# Patient Record
Sex: MALE | Race: WHITE | Employment: OTHER | URBAN - METROPOLITAN AREA
[De-identification: names, ages, dates, MRNs, and addresses within clinical notes are randomized per-mention and may not be internally consistent; named-entity substitution may affect disease eponyms.]

---

## 2018-08-17 ENCOUNTER — APPOINTMENT (OUTPATIENT)
Dept: ULTRASOUND IMAGING | Age: 53
End: 2018-08-17
Payer: MEDICARE

## 2018-08-17 ENCOUNTER — HOSPITAL ENCOUNTER (EMERGENCY)
Age: 53
Discharge: HOME | End: 2018-08-17
Attending: EMERGENCY MEDICINE
Payer: MEDICARE

## 2018-08-17 VITALS
WEIGHT: 270 LBS | RESPIRATION RATE: 18 BRPM | BODY MASS INDEX: 40.92 KG/M2 | OXYGEN SATURATION: 100 % | TEMPERATURE: 98.4 F | HEART RATE: 81 BPM | SYSTOLIC BLOOD PRESSURE: 128 MMHG | DIASTOLIC BLOOD PRESSURE: 97 MMHG | HEIGHT: 68 IN

## 2018-08-17 DIAGNOSIS — N45.2 ACUTE ORCHITIS: ICD-10-CM

## 2018-08-17 DIAGNOSIS — N45.1 EPIDIDYMITIS: Primary | ICD-10-CM

## 2018-08-17 LAB
A/G RATIO: 1.1 (ref 1.1–2.2)
ALBUMIN SERPL-MCNC: 4.1 G/DL (ref 3.4–5)
ALP BLD-CCNC: 62 U/L (ref 40–129)
ALT SERPL-CCNC: 17 U/L (ref 10–40)
ANION GAP SERPL CALCULATED.3IONS-SCNC: 14 MMOL/L (ref 3–16)
AST SERPL-CCNC: 18 U/L (ref 15–37)
BASOPHILS ABSOLUTE: 0.1 K/UL (ref 0–0.2)
BASOPHILS RELATIVE PERCENT: 1 %
BILIRUB SERPL-MCNC: 0.4 MG/DL (ref 0–1)
BUN BLDV-MCNC: 13 MG/DL (ref 7–20)
CALCIUM SERPL-MCNC: 9.6 MG/DL (ref 8.3–10.6)
CHLORIDE BLD-SCNC: 96 MMOL/L (ref 99–110)
CO2: 23 MMOL/L (ref 21–32)
CREAT SERPL-MCNC: 0.9 MG/DL (ref 0.9–1.3)
EOSINOPHILS ABSOLUTE: 0.1 K/UL (ref 0–0.6)
EOSINOPHILS RELATIVE PERCENT: 1.1 %
GFR AFRICAN AMERICAN: >60
GFR NON-AFRICAN AMERICAN: >60
GLOBULIN: 3.9 G/DL
GLUCOSE BLD-MCNC: 103 MG/DL (ref 70–99)
HCT VFR BLD CALC: 43.3 % (ref 40.5–52.5)
HEMOGLOBIN: 15 G/DL (ref 13.5–17.5)
LYMPHOCYTES ABSOLUTE: 2.7 K/UL (ref 1–5.1)
LYMPHOCYTES RELATIVE PERCENT: 22.4 %
MCH RBC QN AUTO: 31.3 PG (ref 26–34)
MCHC RBC AUTO-ENTMCNC: 34.6 G/DL (ref 31–36)
MCV RBC AUTO: 90.3 FL (ref 80–100)
MONOCYTES ABSOLUTE: 0.7 K/UL (ref 0–1.3)
MONOCYTES RELATIVE PERCENT: 5.8 %
NEUTROPHILS ABSOLUTE: 8.4 K/UL (ref 1.7–7.7)
NEUTROPHILS RELATIVE PERCENT: 69.7 %
PDW BLD-RTO: 13.3 % (ref 12.4–15.4)
PLATELET # BLD: 319 K/UL (ref 135–450)
PMV BLD AUTO: 7.5 FL (ref 5–10.5)
POTASSIUM SERPL-SCNC: 4.2 MMOL/L (ref 3.5–5.1)
RBC # BLD: 4.8 M/UL (ref 4.2–5.9)
SODIUM BLD-SCNC: 133 MMOL/L (ref 136–145)
TOTAL PROTEIN: 8 G/DL (ref 6.4–8.2)
WBC # BLD: 12.1 K/UL (ref 4–11)

## 2018-08-17 PROCEDURE — 6370000000 HC RX 637 (ALT 250 FOR IP): Performed by: EMERGENCY MEDICINE

## 2018-08-17 PROCEDURE — 6360000002 HC RX W HCPCS: Performed by: PHYSICIAN ASSISTANT

## 2018-08-17 PROCEDURE — 85025 COMPLETE CBC W/AUTO DIFF WBC: CPT

## 2018-08-17 PROCEDURE — 96374 THER/PROPH/DIAG INJ IV PUSH: CPT

## 2018-08-17 PROCEDURE — 99284 EMERGENCY DEPT VISIT MOD MDM: CPT

## 2018-08-17 PROCEDURE — 80053 COMPREHEN METABOLIC PANEL: CPT

## 2018-08-17 PROCEDURE — 76870 US EXAM SCROTUM: CPT

## 2018-08-17 PROCEDURE — 96375 TX/PRO/DX INJ NEW DRUG ADDON: CPT

## 2018-08-17 RX ORDER — ONDANSETRON 4 MG/1
4 TABLET, ORALLY DISINTEGRATING ORAL ONCE
Status: COMPLETED | OUTPATIENT
Start: 2018-08-17 | End: 2018-08-17

## 2018-08-17 RX ORDER — FENTANYL CITRATE 50 UG/ML
25 INJECTION, SOLUTION INTRAMUSCULAR; INTRAVENOUS ONCE
Status: COMPLETED | OUTPATIENT
Start: 2018-08-17 | End: 2018-08-17

## 2018-08-17 RX ORDER — HYDROCODONE BITARTRATE AND ACETAMINOPHEN 5; 325 MG/1; MG/1
1 TABLET ORAL ONCE
Status: COMPLETED | OUTPATIENT
Start: 2018-08-17 | End: 2018-08-17

## 2018-08-17 RX ORDER — HYDROCODONE BITARTRATE AND ACETAMINOPHEN 5; 325 MG/1; MG/1
1 TABLET ORAL EVERY 6 HOURS PRN
Qty: 8 TABLET | Refills: 0 | Status: SHIPPED | OUTPATIENT
Start: 2018-08-17 | End: 2018-08-24

## 2018-08-17 RX ORDER — KETOROLAC TROMETHAMINE 30 MG/ML
30 INJECTION, SOLUTION INTRAMUSCULAR; INTRAVENOUS ONCE
Status: COMPLETED | OUTPATIENT
Start: 2018-08-17 | End: 2018-08-17

## 2018-08-17 RX ORDER — IBUPROFEN 800 MG/1
800 TABLET ORAL EVERY 8 HOURS PRN
Qty: 30 TABLET | Refills: 0 | Status: SHIPPED | OUTPATIENT
Start: 2018-08-17 | End: 2019-05-10 | Stop reason: ALTCHOICE

## 2018-08-17 RX ORDER — LEVOFLOXACIN 500 MG/1
500 TABLET, FILM COATED ORAL DAILY
Qty: 7 TABLET | Refills: 0 | Status: SHIPPED | OUTPATIENT
Start: 2018-08-17 | End: 2018-08-24

## 2018-08-17 RX ADMIN — FENTANYL CITRATE 25 MCG: 50 INJECTION INTRAMUSCULAR; INTRAVENOUS at 12:29

## 2018-08-17 RX ADMIN — HYDROCODONE BITARTRATE AND ACETAMINOPHEN 1 TABLET: 5; 325 TABLET ORAL at 14:57

## 2018-08-17 RX ADMIN — KETOROLAC TROMETHAMINE 30 MG: 30 INJECTION, SOLUTION INTRAMUSCULAR at 12:29

## 2018-08-17 RX ADMIN — ONDANSETRON 4 MG: 4 TABLET, ORALLY DISINTEGRATING ORAL at 12:29

## 2018-08-17 ASSESSMENT — PAIN DESCRIPTION - LOCATION
LOCATION: GROIN
LOCATION: SCROTUM

## 2018-08-17 ASSESSMENT — PAIN SCALES - GENERAL
PAINLEVEL_OUTOF10: 7
PAINLEVEL_OUTOF10: 8
PAINLEVEL_OUTOF10: 6
PAINLEVEL_OUTOF10: 8

## 2018-08-17 ASSESSMENT — PAIN DESCRIPTION - DESCRIPTORS: DESCRIPTORS: TENDER

## 2018-08-17 ASSESSMENT — ENCOUNTER SYMPTOMS
CONSTIPATION: 0
EYE PAIN: 0
CHEST TIGHTNESS: 0
COUGH: 0
SHORTNESS OF BREATH: 0
SORE THROAT: 0
ABDOMINAL PAIN: 0
NAUSEA: 1
FACIAL SWELLING: 0
BACK PAIN: 0
EYE REDNESS: 0
RHINORRHEA: 0
DIARRHEA: 0

## 2018-08-17 ASSESSMENT — PAIN DESCRIPTION - PAIN TYPE
TYPE: ACUTE PAIN
TYPE: ACUTE PAIN

## 2018-08-17 ASSESSMENT — PAIN DESCRIPTION - ORIENTATION: ORIENTATION: LEFT

## 2018-08-17 NOTE — ED NOTES
Pt back in room from [address] [place] [address] Rd[address].       Aryan Garcia RN  08/17/18 4992

## 2018-08-17 NOTE — ED PROVIDER NOTES
Negative for difficulty urinating, dysuria and enuresis. Musculoskeletal: Negative for arthralgias, back pain and myalgias. Skin: Negative for pallor and rash. Neurological: Negative for dizziness, numbness and headaches. Psychiatric/Behavioral: Negative for agitation, behavioral problems and confusion. Positives and Pertinent negatives as per HPI. Except as noted above in the ROS, all other systems were reviewed and negative. PAST MEDICAL HISTORY     Past Medical History:   Diagnosis Date    CAD (coronary artery disease) 8/25/10    Chronic back pain     Depression 8/25/10    Displacement of cervical intervertebral disc without myelopathy     Gastroesophageal reflux 8/25/10    Hypercholesteremia     Hyperlipidemia 8/25/10    Hypertension     benign essential    Hypogonadism     Hypothyroidism     acquired    Osteoarthritis     Osteoarthritis of spine 8/25/10    Type II or unspecified type diabetes mellitus without mention of complication, not stated as uncontrolled          SURGICAL HISTORY       Past Surgical History:   Procedure Laterality Date    CERVICAL FUSION      LUMBAR FUSION           CURRENT MEDICATIONS       Previous Medications    ALBUTEROL SULFATE HFA (PROAIR HFA) 108 (90 BASE) MCG/ACT INHALER    Use every 4 hours while awake for 7-10 days then PRN wheezing  Dispense with SPACER and Instruct on use. May sub Ventolin or Proventil as needed per [employer]. ATORVASTATIN (LIPITOR) 40 MG TABLET    Take 40 mg by mouth daily. LEVOTHYROXINE (SYNTHROID) 125 MCG TABLET    Take 125 mcg by mouth Daily.     LISINOPRIL (PRINIVIL) 20 MG TABLET    Take 1 tablet by mouth daily    LISINOPRIL (PRINIVIL;ZESTRIL) 20 MG TABLET    TAKE 1 TABLET BY MOUTH DAILY         ALLERGIES     Morphine sulfate    FAMILY HISTORY       Family History   Problem Relation Age of Onset    Cancer Mother     Heart Disease Father     High Blood Pressure Father           SOCIAL HISTORY       Social History     Social History    Marital status:      Spouse name: N/A    Number of children: N/A    Years of education: N/A     Social History Main Topics    Smoking status: Current Every Day Smoker     Packs/day: 1.50     Types: Cigarettes    Smokeless tobacco: Never Used    Alcohol use No    Drug use: No    Sexual activity: Not Asked     Other Topics Concern    None     Social History Narrative    None       SCREENINGS    [place] Coma Scale  Eye Opening: Spontaneous  Best Verbal Response: Oriented  Best Motor Response: Obeys commands  [place] Coma Scale Score: 15        PHYSICAL EXAM    (up to 7 for level 4, 8 or more for level 5)     ED Triage Vitals [08/17/18 1149]   BP Temp Temp Source Pulse Resp SpO2 Height Weight   -- 98.4 °F (36.9 °C) Oral 113 -- 100 % 5' 8\" (1.727 m) 270 lb (122.5 kg)       Physical Exam   Constitutional: He is oriented to person, place, and time. He appears well-developed and well-nourished. HENT:   Head: Normocephalic and atraumatic. Nose: Nose normal.   Eyes: Right eye exhibits no discharge. Left eye exhibits no discharge. Neck: Normal range of motion. Neck supple. Cardiovascular: Normal rate, regular rhythm and normal heart sounds. Exam reveals no gallop and no friction rub. No murmur heard. Pulmonary/Chest: Effort normal and breath sounds normal. No respiratory distress. He has no wheezes. He has no rales. Abdominal: Hernia confirmed negative in the right inguinal area and confirmed negative in the left inguinal area. Genitourinary: Penis normal. Cremasteric reflex is present. Right testis shows no mass, no swelling and no tenderness. Right testis is descended. Cremasteric reflex is not absent on the right side. Left testis shows swelling and tenderness (2 diffuse left testicle, specifically epididymis). Left testis shows no mass. Left testis is descended. Circumcised. No phimosis, paraphimosis, hypospadias, penile erythema or penile tenderness.  No discharge found. Musculoskeletal: Normal range of motion. Lymphadenopathy:        Right: No inguinal adenopathy present. Left: No inguinal adenopathy present. Neurological: He is alert and oriented to person, place, and time. Skin: Skin is warm and dry. He is not diaphoretic. Psychiatric: He has a normal mood and affect. His behavior is normal.   Nursing note and vitals reviewed. DIAGNOSTIC RESULTS   LABS:    Labs Reviewed   CBC WITH AUTO DIFFERENTIAL - Abnormal; Notable for the following:        Result Value    WBC 12.1 (*)     Neutrophils # 8.4 (*)     All other components within normal limits    Narrative:     Performed at:  [facility],  [place], [facility]   Phone [phone]   COMPREHENSIVE METABOLIC PANEL - Abnormal; Notable for the following:     Sodium 133 (*)     Chloride 96 (*)     Glucose 103 (*)     All other components within normal limits    Narrative:     Performed at:  [facility]) [facility],  [place], [place]   Phone [phone]   URINE RT REFLEX TO CULTURE       All other labs were within normal range or not returned as of this dictation. EKG: All EKG's are interpreted by the Emergency Department Physician who either signs or Co-signs this chart in the absence of a cardiologist.  Please see their note for interpretation of EKG. RADIOLOGY:   Non-plain film images such as CT, Ultrasound and MRI are read by the radiologist. Plain radiographic images are visualized and preliminarily interpreted by the  ED Provider with the below findings:        Interpretation per the Radiologist below, if available at the time of this note:    [address]   Final Result   Left epididymis appears slightly edematous. No hypervascularity or   epididymal lesion. No results found.       PROCEDURES   Unless otherwise noted below, none     Procedures    CRITICAL CARE TIME as soon as possible for a visit in 2 days      [place] [facility] ED  [address] [place] [address]  Go to   As needed, If symptoms worsen    Kristi Hansen, [address]  [address] [facility] [address]    Schedule an appointment as soon as possible for a visit in 2 days  Urology      DISCHARGE MEDICATIONS:  New Prescriptions    HYDROCODONE-ACETAMINOPHEN (NORCO) 5-325 MG PER TABLET    Take 1 tablet by mouth every 6 hours as needed for Pain for up to 7 days. .    IBUPROFEN (ADVIL;MOTRIN) 800 MG TABLET    Take 1 tablet by mouth every 8 hours as needed for Pain    LEVOFLOXACIN (LEVAQUIN) 500 MG TABLET    Take 1 tablet by mouth daily for 7 days       DISCONTINUED MEDICATIONS:  Discontinued Medications    No medications on file              (Please note that portions of this note were completed with a voice recognition program.  Efforts were made to edit the dictations but occasionally words are mis-transcribed.)    Rosamaria Donaldson PA-C (electronically signed)            Rissa Harrison PA-C  08/17/18 5888

## 2019-03-31 ENCOUNTER — APPOINTMENT (OUTPATIENT)
Dept: GENERAL RADIOLOGY | Age: 54
End: 2019-03-31
Payer: MEDICARE

## 2019-03-31 ENCOUNTER — HOSPITAL ENCOUNTER (EMERGENCY)
Age: 54
Discharge: HOME | End: 2019-03-31
Attending: EMERGENCY MEDICINE
Payer: MEDICARE

## 2019-03-31 VITALS
DIASTOLIC BLOOD PRESSURE: 96 MMHG | OXYGEN SATURATION: 100 % | HEIGHT: 68 IN | HEART RATE: 72 BPM | BODY MASS INDEX: 39.4 KG/M2 | SYSTOLIC BLOOD PRESSURE: 176 MMHG | RESPIRATION RATE: 20 BRPM | TEMPERATURE: 98.4 F | WEIGHT: 260 LBS

## 2019-03-31 DIAGNOSIS — R03.0 ELEVATED BLOOD PRESSURE READING: ICD-10-CM
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
## 2019-03-31 DIAGNOSIS — R07.81 RIB PAIN ON RIGHT SIDE: ICD-10-CM

## 2019-03-31 PROCEDURE — 6370000000 HC RX 637 (ALT 250 FOR IP): Performed by: PHYSICIAN ASSISTANT

## 2019-03-31 PROCEDURE — 74018 RADEX ABDOMEN 1 VIEW: CPT

## 2019-03-31 PROCEDURE — 71101 X-RAY EXAM UNILAT RIBS/CHEST: CPT

## 2019-03-31 PROCEDURE — 73080 X-RAY EXAM OF ELBOW: CPT

## 2019-03-31 PROCEDURE — 99284 EMERGENCY DEPT VISIT MOD MDM: CPT

## 2019-03-31 RX ORDER — CYCLOBENZAPRINE HCL 10 MG
10 TABLET ORAL 3 TIMES DAILY PRN
Qty: 20 TABLET | Refills: 0 | Status: SHIPPED | OUTPATIENT
Start: 2019-03-31 | End: 2019-04-07

## 2019-03-31 RX ORDER — CYCLOBENZAPRINE HCL 10 MG
10 TABLET ORAL ONCE
Status: COMPLETED | OUTPATIENT
Start: 2019-03-31 | End: 2019-03-31

## 2019-03-31 RX ORDER — NAPROXEN 500 MG/1
500 TABLET ORAL ONCE
Status: COMPLETED | OUTPATIENT
Start: 2019-03-31 | End: 2019-03-31

## 2019-03-31 RX ORDER — LISINOPRIL 10 MG/1
20 TABLET ORAL DAILY
Qty: 30 TABLET | Refills: 0 | Status: SHIPPED | OUTPATIENT
Start: 2019-03-31 | End: 2019-05-10

## 2019-03-31 RX ORDER — LISINOPRIL 20 MG/1
20 TABLET ORAL ONCE
Status: COMPLETED | OUTPATIENT
Start: 2019-03-31 | End: 2019-03-31

## 2019-03-31 RX ORDER — HYDROCODONE BITARTRATE AND ACETAMINOPHEN 5; 325 MG/1; MG/1
1 TABLET ORAL EVERY 6 HOURS PRN
Qty: 6 TABLET | Refills: 0 | Status: SHIPPED | OUTPATIENT
Start: 2019-03-31 | End: 2019-04-02

## 2019-03-31 RX ADMIN — NAPROXEN 500 MG: 500 TABLET ORAL at 13:01

## 2019-03-31 RX ADMIN — CYCLOBENZAPRINE HYDROCHLORIDE 10 MG: 10 TABLET, FILM COATED ORAL at 13:02

## 2019-03-31 RX ADMIN — LISINOPRIL 20 MG: 20 TABLET ORAL at 13:52

## 2019-03-31 ASSESSMENT — PAIN DESCRIPTION - LOCATION
LOCATION_2: ELBOW
LOCATION: BACK;RIB CAGE
LOCATION_3: RIB CAGE
LOCATION: BACK

## 2019-03-31 ASSESSMENT — PAIN SCALES - GENERAL
PAINLEVEL_OUTOF10: 7
PAINLEVEL_OUTOF10: 5

## 2019-03-31 ASSESSMENT — PAIN DESCRIPTION - PAIN TYPE
TYPE: ACUTE PAIN;CHRONIC PAIN
TYPE_3: ACUTE PAIN
TYPE_2: ACUTE PAIN
TYPE: ACUTE PAIN

## 2019-03-31 ASSESSMENT — ENCOUNTER SYMPTOMS
RESPIRATORY NEGATIVE: 1
BACK PAIN: 1
GASTROINTESTINAL NEGATIVE: 1

## 2019-03-31 ASSESSMENT — PAIN DESCRIPTION - DESCRIPTORS
DESCRIPTORS: ACHING
DESCRIPTORS: ACHING
DESCRIPTORS_3: ACHING

## 2019-03-31 ASSESSMENT — PAIN DESCRIPTION - ORIENTATION
ORIENTATION_3: RIGHT
ORIENTATION_2: RIGHT

## 2019-03-31 ASSESSMENT — PAIN DESCRIPTION - INTENSITY
RATING_3: 6
RATING_2: 4

## 2019-03-31 NOTE — ED PROVIDER NOTES
[address] ED  eMERGENCY dEPARTMENT eNCOUnter        Pt Name: Leno Pearson. MRN: [id number]  Birthdate 1965  Date of evaluation: 3/31/2019  Provider: Tammi Palencia PA-C  PCP: Kimberly Rhodes MD    This patient was seen and evaluated by the attending physician Dr. Elise Velazquez. CHIEF COMPLAINT       Chief Complaint   Patient presents with    Fall     slipped and fell in water this morning. Pt states pain to right elbow, right ribs and back pain. Denies LOC did not strike his head       HISTORY OF PRESENT ILLNESS   (Location/Symptom, Timing/Onset, Context/Setting, Quality, Duration, Modifying Factors, Severity)  Note limiting factors. Leno Pearson. is a 53 y.o. male brought in today complaining of right sided rib pain as well as right elbow pain. Patient states he was mopping any slipped and fell landing on his right side. Onset was about 6 hours ago. Symptoms have been persistent since onset. Patient states he took Motrin and Tylenol with no relief at home. Patient denies hitting his head or loss of consciousness with the fall. No aggravating symptoms. No alleviating symptoms. Patient has not tried anything else at home for symptomatic relief at this time. Nursing Notes were all reviewed and agreed with or any disagreements were addressed  in the HPI. REVIEW OF SYSTEMS    (2-9 systems for level 4, 10 or more for level 5)     Review of Systems   Constitutional: Negative. HENT: Negative. Respiratory: Negative. Cardiovascular: Negative. Gastrointestinal: Negative. Genitourinary: Negative. Musculoskeletal: Positive for arthralgias and back pain. Skin: Negative. Neurological: Negative. Positives and Pertinent negatives as per HPI. Except as noted abovein the ROS, all other systems were reviewed and negative.        PAST MEDICAL HISTORY     Past Medical History:   Diagnosis Date    CAD (coronary artery disease) 8/25/10    Chronic back pain     Depression 8/25/10    Displacement of cervical intervertebral disc without myelopathy     Gastroesophageal reflux 8/25/10    Hypercholesteremia     Hyperlipidemia 8/25/10    Hypertension     benign essential    Hypogonadism     Hypothyroidism     acquired    Osteoarthritis     Osteoarthritis of spine 8/25/10    Type II or unspecified type diabetes mellitus without mention of complication, not stated as uncontrolled          SURGICAL HISTORY     Past Surgical History:   Procedure Laterality Date    CERVICAL FUSION      LUMBAR FUSION           CURRENTMEDICATIONS       Previous Medications    ALBUTEROL SULFATE HFA (PROAIR HFA) 108 (90 BASE) MCG/ACT INHALER    Use every 4 hours while awake for 7-10 days then PRN wheezing  Dispense with SPACER and Instruct on use. May sub Ventolin or Proventil as needed per [employer]. ATORVASTATIN (LIPITOR) 40 MG TABLET    Take 40 mg by mouth daily. IBUPROFEN (ADVIL;MOTRIN) 800 MG TABLET    Take 1 tablet by mouth every 8 hours as needed for Pain    LEVOTHYROXINE (SYNTHROID) 125 MCG TABLET    Take 125 mcg by mouth Daily.     LISINOPRIL (PRINIVIL) 20 MG TABLET    Take 1 tablet by mouth daily    LISINOPRIL (PRINIVIL;ZESTRIL) 20 MG TABLET    TAKE 1 TABLET BY MOUTH DAILY         ALLERGIES     Morphine sulfate    FAMILYHISTORY       Family History   Problem Relation Age of Onset    Cancer Mother     Heart Disease Father     High Blood Pressure Father           SOCIAL HISTORY       Social History     Socioeconomic History    Marital status:      Spouse name: None    Number of children: None    Years of education: None    Highest education level: None   Occupational History    None   Social Needs    Financial resource strain: None    Food insecurity:     Worry: None     Inability: None    Transportation needs:     Medical: None     Non-medical: None   Tobacco Use    Smoking status: Current Every Day Smoker     Packs/day: 1.50     Types: Cigarettes    Smokeless tobacco: Never Used   Substance and Sexual Activity    Alcohol use: No    Drug use: No    Sexual activity: None   Lifestyle    Physical activity:     Days per week: None     Minutes per session: None    Stress: None   Relationships    Social connections:     Talks on phone: None     Gets together: None     Attends Buddhist service: None     Active member of club or organization: None     Attends meetings of clubs or organizations: None     Relationship status: None    Intimate partner violence:     Fear of current or ex partner: None     Emotionally abused: None     Physically abused: None     Forced sexual activity: None   Other Topics Concern    None   Social History Narrative    None       SCREENINGS    [place] Coma Scale  Eye Opening: Spontaneous  Best Verbal Response: Oriented  Best Motor Response: Obeys commands  [place] Coma Scale Score: 15        PHYSICAL EXAM    (up to 7 for level 4, 8 or more for level 5)     ED Triage Vitals [03/31/19 1141]   BP Temp Temp Source Pulse Resp SpO2 Height Weight   (!) 191/106 97 °F (36.1 °C) Oral 93 20 99 % 5' 8\" (1.727 m) 260 lb (117.9 kg)       Physical Exam   Constitutional: He is oriented to person, place, and time. He appears well-developed and well-nourished. HENT:   Head: Normocephalic and atraumatic. Nose: Nose normal.   Eyes: Right eye exhibits no discharge. Left eye exhibits no discharge. Neck: Normal range of motion. Neck supple. Cardiovascular: Normal rate, regular rhythm and normal heart sounds. Exam reveals no gallop. No murmur heard. Pulmonary/Chest: Effort normal and breath sounds normal. No respiratory distress. He has no wheezes. He has no rales. He exhibits no tenderness. Musculoskeletal: Normal range of motion. He exhibits no deformity. Right shoulder: Normal. He exhibits normal range of motion, no tenderness, no bony tenderness, no deformity and no pain.         Right elbow: Tenderness found. Radial head, medial epicondyle and olecranon process tenderness noted. Cervical back: He exhibits no tenderness, no bony tenderness, no deformity and no pain. Thoracic back: Normal. He exhibits no tenderness, no bony tenderness and no pain. Lumbar back: He exhibits no tenderness, no bony tenderness, no deformity and no pain. Arms:  Neurological: He is alert and oriented to person, place, and time. He has normal strength. No sensory deficit. Reflex Scores:       Tricep reflexes are 2+ on the right side and 2+ on the left side. Bicep reflexes are 2+ on the right side and 2+ on the left side. Brachioradialis reflexes are 2+ on the right side and 2+ on the left side. Skin: Skin is warm and dry. He is not diaphoretic. Psychiatric: He has a normal mood and affect. His behavior is normal.   Nursing note and vitals reviewed. DIAGNOSTIC RESULTS   LABS:    Labs Reviewed - No data to display    All other labs were within normal range or not returned as of this dictation. EKG: All EKG's are interpreted by the Emergency Department Physician who either signs orCo-signs this chart in the absence of a cardiologist.  Please see their note for interpretation of EKG. RADIOLOGY:   Non-plain film images such as CT, Ultrasound and MRI are read by the radiologist. [facility] radiographic images are visualized andpreliminarily interpreted by the  ED Provider with the below findings:        Interpretation [facility] Radiologist below, if available at the time of this note:    XR ABDOMEN (KUB) (SINGLE AP VIEW)   Final Result   No acute process demonstrated         XR RIBS RIGHT INCLUDE CHEST (MIN 3 VIEWS)   Final Result   No evidence of rib fracture         XR ELBOW RIGHT (MIN 3 VIEWS)   Final Result   No radiographic abnormality of the elbow. No fracture or dislocation. No results found.       PROCEDURES   Unless otherwise noted below, none     Procedures    CRITICAL CARE TIME   N/A    CONSULTS:  None      EMERGENCY DEPARTMENT COURSE and DIFFERENTIALDIAGNOSIS/MDM:   Vitals:    Vitals:    03/31/19 1300 03/31/19 1302 03/31/19 1418 03/31/19 1419   BP: (!) 182/111 (!) 182/111 (!) 181/102 (!) 181/102   Pulse: 80  72    Resp: 20      Temp: 98.4 °F (36.9 °C)      TempSrc: Oral      SpO2: 99% 99%  100%   Weight:       Height:           Patient was given thefollowing medications:  Medications   cyclobenzaprine (FLEXERIL) tablet 10 mg (10 mg Oral Given 3/31/19 1302)   naproxen (NAPROSYN) tablet 500 mg (500 mg Oral Given 3/31/19 1301)   lisinopril (PRINIVIL;ZESTRIL) tablet 20 mg (20 mg Oral Given 3/31/19 1352)       Patient brought in today for complaints of a fall. Patient states he landed on his right side and is complaining of right elbow pain and right sided rib pain. On exam he is alert oriented afebrile hemodynamically stable breathing comfortably and room air satting at 99%. Patient appears nontoxic no respiratory distress. Patient does have reproducible right-sided rib pain. No ecchymosis or deformities noted to the rib cage. Patient has bilateral and equal breath sounds. Patient has no bony deformities to the cervical thoracic or lumbar spine. No bony tenderness. Full range of motion of his back and right arm. Patient neurovascularly intact. Chest X-ray of the right ribs and chest are unremarkable for acute fracture. X-ray of the right elbow is negative as well. Most of his symptoms are musculoskeletal.  Patient will be given Norco and Flexeril for home. I did write him lisinopril 20 mg once daily for 1 month as he stated he was out of his blood pressure medication. Advised him to call his primary care provider and to follow-up within one week. I also gave him follow-up for [place] [facility] if pain did not resolve. Patient told to decrease activity and to rest at home. I also encouraged icing for inflammation.  Seen and evaluated by my attending who agreed with

## 2019-05-10 ENCOUNTER — OFFICE VISIT (OUTPATIENT)
Dept: FAMILY MEDICINE CLINIC | Age: 54
End: 2019-05-10
Payer: MEDICARE

## 2019-05-10 VITALS
BODY MASS INDEX: 40.16 KG/M2 | HEIGHT: 68 IN | DIASTOLIC BLOOD PRESSURE: 107 MMHG | WEIGHT: 265 LBS | HEART RATE: 86 BPM | OXYGEN SATURATION: 98 % | SYSTOLIC BLOOD PRESSURE: 194 MMHG

## 2019-05-10 DIAGNOSIS — E11.42 DIABETIC POLYNEUROPATHY ASSOCIATED WITH TYPE 2 DIABETES MELLITUS (HCC): Primary | ICD-10-CM

## 2019-05-10 DIAGNOSIS — Z72.0 TOBACCO USE: ICD-10-CM

## 2019-05-10 DIAGNOSIS — Z12.11 ENCOUNTER FOR COLORECTAL CANCER SCREENING: ICD-10-CM

## 2019-05-10 DIAGNOSIS — Z12.12 ENCOUNTER FOR COLORECTAL CANCER SCREENING: ICD-10-CM

## 2019-05-10 DIAGNOSIS — E66.01 MORBID OBESITY WITH BMI OF 40.0-44.9, ADULT (HCC): ICD-10-CM

## 2019-05-10 DIAGNOSIS — E11.69 TYPE 2 DIABETES MELLITUS WITH OTHER SPECIFIED COMPLICATION, UNSPECIFIED WHETHER LONG TERM INSULIN USE (HCC): ICD-10-CM

## 2019-05-10 DIAGNOSIS — I10 ESSENTIAL HYPERTENSION: ICD-10-CM

## 2019-05-10 LAB — HBA1C MFR BLD: 5.8 %

## 2019-05-10 PROCEDURE — G8417 CALC BMI ABV UP PARAM F/U: HCPCS | Performed by: FAMILY MEDICINE

## 2019-05-10 PROCEDURE — 4004F PT TOBACCO SCREEN RCVD TLK: CPT | Performed by: FAMILY MEDICINE

## 2019-05-10 PROCEDURE — 99204 OFFICE O/P NEW MOD 45 MIN: CPT | Performed by: FAMILY MEDICINE

## 2019-05-10 PROCEDURE — 3017F COLORECTAL CA SCREEN DOC REV: CPT | Performed by: FAMILY MEDICINE

## 2019-05-10 PROCEDURE — 2022F DILAT RTA XM EVC RTNOPTHY: CPT | Performed by: FAMILY MEDICINE

## 2019-05-10 PROCEDURE — 83036 HEMOGLOBIN GLYCOSYLATED A1C: CPT | Performed by: FAMILY MEDICINE

## 2019-05-10 PROCEDURE — 3046F HEMOGLOBIN A1C LEVEL >9.0%: CPT | Performed by: FAMILY MEDICINE

## 2019-05-10 PROCEDURE — G8427 DOCREV CUR MEDS BY ELIG CLIN: HCPCS | Performed by: FAMILY MEDICINE

## 2019-05-10 RX ORDER — BLOOD PRESSURE TEST KIT
1 KIT MISCELLANEOUS 2 TIMES DAILY
Qty: 1 KIT | Refills: 0 | Status: CANCELLED | OUTPATIENT
Start: 2019-05-10

## 2019-05-10 RX ORDER — VARENICLINE TARTRATE 25 MG
KIT ORAL
Qty: 1 BOX | Refills: 0 | Status: SHIPPED | OUTPATIENT
Start: 2019-05-10 | End: 2019-06-19 | Stop reason: ALTCHOICE

## 2019-05-10 RX ORDER — VARENICLINE TARTRATE 25 MG
KIT ORAL
Qty: 1 BOX | Refills: 0 | Status: SHIPPED | OUTPATIENT
Start: 2019-05-10 | End: 2019-05-10 | Stop reason: SDUPTHER

## 2019-05-10 RX ORDER — LISINOPRIL 10 MG/1
10 TABLET ORAL DAILY
Qty: 90 TABLET | Refills: 1 | Status: SHIPPED | OUTPATIENT
Start: 2019-05-10 | End: 2019-05-10 | Stop reason: DRUGHIGH

## 2019-05-10 RX ORDER — LISINOPRIL 20 MG/1
20 TABLET ORAL DAILY
Qty: 30 TABLET | Refills: 3 | Status: SHIPPED | OUTPATIENT
Start: 2019-05-10 | End: 2019-08-07 | Stop reason: SDUPTHER

## 2019-05-10 ASSESSMENT — ENCOUNTER SYMPTOMS: BACK PAIN: 1

## 2019-05-10 NOTE — PROGRESS NOTES
5/10/2019    This is a 53 y.o. male   Chief Complaint   Patient presents with   [facility].     HTN; hyperlipidemia; hypothyroidism; Hx of DM; neuropathy   . HPI  Pt presents today as a new pt to establish a PCP. States that he had a CVA while being operated on for back surgery in 2011, feels that his BP has been elevated since then, currently on no medications. Was raising his 10 y granddaughter and his wife left them a little over a year. Has lost over 30 pounds, does get HA's when BP elevated. Wants Chantix as he is tired of smoking and wants to quit, currently uses medical marijuana instead of opiod pain medication. Has used Chantix in the past with success, denies suicidal ideation the, did have some bad dreams, currently smoking 2 packs a day. 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  Was dx in 2010, denies fatigue, polydipsia, polydipsia, or polyuria  Past Medical History:   Diagnosis Date    CAD (coronary artery disease) 8/25/10    Chronic back pain     Depression 8/25/10    Displacement of cervical intervertebral disc without myelopathy     Gastroesophageal reflux 8/25/10    Hypercholesteremia     Hyperlipidemia 8/25/10    Hypertension     benign essential    Hypogonadism     Hypothyroidism     acquired    Osteoarthritis     Osteoarthritis of spine 8/25/10    Type II or unspecified type diabetes mellitus without mention of complication, not stated as uncontrolled        Past Surgical History:   Procedure Laterality Date    CERVICAL FUSION      CHOLECYSTECTOMY      LUMBAR FUSION         Social History     Socioeconomic History    Marital status:      Spouse name: Not on file    Number of children: Not on file    Years of education: Not on file    Highest education level: Not on file   Occupational History    Not on file   Social Needs    Financial resource strain: Not on file    Food insecurity:     Worry: Not on file     Inability: Not on file    Transportation needs:     Medical: Not on file     Non-medical: Not on file   Tobacco Use    Smoking status: Current Every Day Smoker     Packs/day: 1.50     Years: 30.00     Pack years: 45.00     Types: Cigarettes    Smokeless tobacco: Never Used   Substance and Sexual Activity    Alcohol use: No    Drug use: Yes     Types: Marijuana     Comment: Medical marijuana card     Sexual activity: Not on file   Lifestyle    Physical activity:     Days per week: Not on file     Minutes per session: Not on file    Stress: Not on file   Relationships    Social connections:     Talks on phone: Not on file     Gets together: Not on file     Attends Episcopalian service: Not on file     Active member of club or organization: Not on file     Attends meetings of clubs or organizations: Not on file     Relationship status: Not on file    Intimate partner violence:     Fear of current or ex partner: Not on file     Emotionally abused: Not on file     Physically abused: Not on file     Forced sexual activity: Not on file   Other Topics Concern    Not on file   Social History Narrative    Not on file       Family History   Problem Relation Age of Onset    Cancer Mother     Heart Disease Father     High Blood Pressure Father        Current Outpatient Medications   Medication Sig Dispense Refill    varenicline (CHANTIX STARTING MONTH PAK) 0.5 MG X 11 & 1 MG X 42 tablet Take by mouth. 1 box 0    lisinopril (PRINIVIL;ZESTRIL) 20 MG tablet Take 1 tablet by mouth daily 30 tablet 3     No current facility-administered medications for this visit. Immunization History   Administered Date(s) Administered    Influenza Whole 10/28/2010       Allergies   Allergen Reactions    Morphine Sulfate      Pt is only allergic to Morphine ER, not IVP Morphine.        Admission on 08/17/2018, Discharged on 08/17/2018   Component Date Value Ref Range Status Albumin/Globulin Ratio 08/17/2018 1.1  1.1 - 2.2 Final    Total Bilirubin 08/17/2018 0.4  0.0 - 1.0 mg/dL Final    Alkaline Phosphatase 08/17/2018 62  40 - 129 U/L Final    ALT 08/17/2018 17  10 - 40 U/L Final    AST 08/17/2018 18  15 - 37 U/L Final    Globulin 08/17/2018 3.9  g/dL Final       Review of Systems   Constitutional: Negative for fatigue. Endocrine: Negative for polydipsia, polyphagia and polyuria. Musculoskeletal: Positive for back pain. Neurological: Positive for headaches. Negative for dizziness and light-headedness. BP (!) 194/107   Pulse 86   Ht 5' 8\" (1.727 m)   Wt 265 lb (120.2 kg)   SpO2 98%   BMI 40.29 kg/m²     Physical Exam   Constitutional: He is oriented to person, place, and time. He appears well-developed and well-nourished. HENT:   Head: Normocephalic and atraumatic. Eyes: Pupils are equal, round, and reactive to light. EOM are normal.   Neck: Normal range of motion. Cardiovascular: Normal rate, regular rhythm and normal heart sounds. Pulmonary/Chest: Effort normal and breath sounds normal. He has no wheezes. Abdominal: Bowel sounds are normal. There is no tenderness. Neurological: He is alert and oriented to person, place, and time. Psychiatric: He has a normal mood and affect. His behavior is normal. Judgment and thought content normal.   Vitals reviewed. Plan   Diagnosis Orders   1. Diabetic polyneuropathy associated with type 2 diabetes mellitus ([facility] [address]     2. Type 2 diabetes mellitus with other specified complication, unspecified whether long term insulin use (HCC)  POCT glycosylated hemoglobin (Hb A1C), today 5.8   3. Encounter for colorectal cancer screening  Sundar Ni MD, Gastroenterology, [facility]   4. Essential hypertension  Lisinopril 20 mg   5.  Tobacco use  varenicline (CHANTIX STARTING MONTH PAK) 0.5 MG X 11 & 1 MG X 42 tablet    DISCONTINUED: varenicline (CHANTIX STARTING MONTH PAK) 0.5 MG X 11 & 1 MG X 42 tablet Pt Instructions:  Record your BP twice a day to a log to bring to your next appointment. Return in about 2 weeks (around 5/24/2019) for HTN F/U. Counseled pt that I do not prescribe/refill CDL oil or opioid pain medication. Will consider Lyrica for diabetic polyneuropathy at next visit. Prior to Visit Medications    Medication Sig Taking? Authorizing Provider   varenicline (CHANTIX STARTING MONTH ESAU) 0.5 MG X 11 & 1 MG X 42 tablet Take by mouth.  Yes Isaak Taveras,    lisinopril (PRINIVIL;ZESTRIL) 20 MG tablet Take 1 tablet by mouth daily Yes Isaak Taveras, DO

## 2019-05-13 ENCOUNTER — TELEPHONE (OUTPATIENT)
Dept: FAMILY MEDICINE CLINIC | Age: 54
End: 2019-05-13

## 2019-05-13 DIAGNOSIS — I10 ESSENTIAL HYPERTENSION: Primary | ICD-10-CM

## 2019-05-13 RX ORDER — BLOOD PRESSURE TEST KIT
1 KIT MISCELLANEOUS 2 TIMES DAILY
Qty: 1 KIT | Refills: 0 | Status: SHIPPED | OUTPATIENT
Start: 2019-05-13 | End: 2020-01-17

## 2019-05-24 ENCOUNTER — OFFICE VISIT (OUTPATIENT)
Dept: FAMILY MEDICINE CLINIC | Age: 54
End: 2019-05-24
Payer: MEDICARE

## 2019-05-24 VITALS
OXYGEN SATURATION: 100 % | BODY MASS INDEX: 39.19 KG/M2 | WEIGHT: 258.6 LBS | TEMPERATURE: 97.7 F | SYSTOLIC BLOOD PRESSURE: 132 MMHG | HEART RATE: 73 BPM | DIASTOLIC BLOOD PRESSURE: 68 MMHG | RESPIRATION RATE: 16 BRPM | HEIGHT: 68 IN

## 2019-05-24 DIAGNOSIS — Z23 NEED FOR PROPHYLACTIC VACCINATION AGAINST STREPTOCOCCUS PNEUMONIAE (PNEUMOCOCCUS): ICD-10-CM

## 2019-05-24 DIAGNOSIS — Z23 NEED FOR PROPHYLACTIC VACCINATION AND INOCULATION AGAINST VARICELLA: ICD-10-CM

## 2019-05-24 DIAGNOSIS — M54.9 CHRONIC BILATERAL BACK PAIN, UNSPECIFIED BACK LOCATION: ICD-10-CM

## 2019-05-24 DIAGNOSIS — I10 ESSENTIAL HYPERTENSION: Primary | ICD-10-CM

## 2019-05-24 DIAGNOSIS — N52.9 ERECTILE DYSFUNCTION, UNSPECIFIED ERECTILE DYSFUNCTION TYPE: ICD-10-CM

## 2019-05-24 DIAGNOSIS — Z23 NEED FOR PROPHYLACTIC VACCINATION AGAINST DIPHTHERIA-TETANUS-PERTUSSIS (DTP): ICD-10-CM

## 2019-05-24 DIAGNOSIS — G89.29 CHRONIC BILATERAL BACK PAIN, UNSPECIFIED BACK LOCATION: ICD-10-CM

## 2019-05-24 DIAGNOSIS — Z72.0 TOBACCO USE: ICD-10-CM

## 2019-05-24 PROCEDURE — G8427 DOCREV CUR MEDS BY ELIG CLIN: HCPCS | Performed by: FAMILY MEDICINE

## 2019-05-24 PROCEDURE — G8417 CALC BMI ABV UP PARAM F/U: HCPCS | Performed by: FAMILY MEDICINE

## 2019-05-24 PROCEDURE — 99214 OFFICE O/P EST MOD 30 MIN: CPT | Performed by: FAMILY MEDICINE

## 2019-05-24 PROCEDURE — 90732 PPSV23 VACC 2 YRS+ SUBQ/IM: CPT | Performed by: FAMILY MEDICINE

## 2019-05-24 PROCEDURE — 3017F COLORECTAL CA SCREEN DOC REV: CPT | Performed by: FAMILY MEDICINE

## 2019-05-24 PROCEDURE — 4004F PT TOBACCO SCREEN RCVD TLK: CPT | Performed by: FAMILY MEDICINE

## 2019-05-24 PROCEDURE — G0009 ADMIN PNEUMOCOCCAL VACCINE: HCPCS | Performed by: FAMILY MEDICINE

## 2019-05-24 ASSESSMENT — PATIENT HEALTH QUESTIONNAIRE - PHQ9
SUM OF ALL RESPONSES TO PHQ9 QUESTIONS 1 & 2: 0
1. LITTLE INTEREST OR PLEASURE IN DOING THINGS: 0
SUM OF ALL RESPONSES TO PHQ QUESTIONS 1-9: 0
SUM OF ALL RESPONSES TO PHQ QUESTIONS 1-9: 0
2. FEELING DOWN, DEPRESSED OR HOPELESS: 0

## 2019-05-24 ASSESSMENT — ENCOUNTER SYMPTOMS: BACK PAIN: 1

## 2019-05-24 NOTE — PROGRESS NOTES
education: Not on file    Highest education level: Not on file   Occupational History    Not on file   Social Needs    Financial resource strain: Not on file    Food insecurity:     Worry: Not on file     Inability: Not on file    Transportation needs:     Medical: Not on file     Non-medical: Not on file   Tobacco Use    Smoking status: Current Every Day Smoker     Packs/day: 1.50     Years: 30.00     Pack years: 45.00     Types: Cigarettes    Smokeless tobacco: Never Used   Substance and Sexual Activity    Alcohol use: No    Drug use: Yes     Types: Marijuana     Comment: Medical marijuana card     Sexual activity: Not on file   Lifestyle    Physical activity:     Days per week: Not on file     Minutes per session: Not on file    Stress: Not on file   Relationships    Social connections:     Talks on phone: Not on file     Gets together: Not on file     Attends [employer] service: Not on file     Active member of club or organization: Not on file     Attends meetings of clubs or organizations: Not on file     Relationship status: Not on file    Intimate partner violence:     Fear of current or ex partner: Not on file     Emotionally abused: Not on file     Physically abused: Not on file     Forced sexual activity: Not on file   Other Topics Concern    Not on file   Social History Narrative    Not on file       Family History   Problem Relation Age of Onset    Cancer Mother     Heart Disease Father     High Blood Pressure Father        Current Outpatient Medications   Medication Sig Dispense Refill    varenicline (CHANTIX STARTING MONTH PAK) 0.5 MG X 11 & 1 MG X 42 tablet Take by mouth. 1 box 0    lisinopril (PRINIVIL;ZESTRIL) 20 MG tablet Take 1 tablet by mouth daily 30 tablet 3    Blood Pressure KIT 1 kit by Does not apply route 2 times daily 1 kit 0     No current facility-administered medications for this visit.         Immunization History   Administered Date(s) Administered    Influenza Whole 10/28/2010       Allergies   Allergen Reactions    Morphine Sulfate      Pt is only allergic to Morphine ER, not IVP Morphine. Office Visit on 05/10/2019   Component Date Value Ref Range Status    Hemoglobin A1C 05/10/2019 5.8  % Final       Review of Systems   Constitutional: Positive for fatigue. Musculoskeletal: Positive for back pain. Neurological: Negative for dizziness and headaches. Improved facial flushing   Psychiatric/Behavioral: The patient is nervous/anxious (improved since starting Lisinopril). /68 (Site: Left Upper Arm, Position: Sitting, Cuff Size: Medium Adult)   Pulse 73   Temp 97.7 °F (36.5 °C) (Oral)   Resp 16   Ht 5' 8\" (1.727 m)   Wt 258 lb 9.6 oz (117.3 kg)   SpO2 100%   BMI 39.32 kg/m²     Physical Exam   Constitutional: He is oriented to person, place, and time. He appears well-developed and well-nourished. HENT:   Head: Normocephalic and atraumatic. Eyes: Pupils are equal, round, and reactive to light. EOM are normal.   Neck: Normal range of motion. Cardiovascular: Normal rate, regular rhythm and normal heart sounds. Pulmonary/Chest: Effort normal and breath sounds normal. He has no wheezes (bilateral). Abdominal: Bowel sounds are normal. There is no tenderness. Neurological: He is alert and oriented to person, place, and time. Psychiatric: He has a normal mood and affect. His behavior is normal. Judgment and thought content normal.   Vitals reviewed. Plan   Diagnosis Orders   1. Essential hypertension  Controlled on Lisinopril 20 mg        2. Need for prophylactic vaccination against Streptococcus pneumoniae (pneumococcus)  Pneumococcal polysaccharide vaccine 23-valent PPSV23   3. Need for prophylactic vaccination against diphtheria-tetanus-pertussis (DTP)  Tdap (ADACEL) 5-2-15.5 LF-MCG/0.5 injection   4.  Need for prophylactic vaccination and inoculation against varicella  zoster recombinant adjuvanted vaccine [facility]) 50 MCG/0.5ML SUSR injection   5. Tobacco use  Continue Chantix       Return in about 1 month (around 6/24/2019) for Physical Exam.    Prior to Visit Medications    Medication Sig Taking? Authorizing Provider   varenicline (CHANTIX STARTING MONTH PAK) 0.5 MG X 11 & 1 MG X 42 tablet Take by mouth.  Yes Gilford Nao, DO   lisinopril (PRINIVIL;ZESTRIL) 20 MG tablet Take 1 tablet by mouth daily Yes Gilford Nao, DO   Blood Pressure KIT 1 kit by Does not apply route 2 times daily  Gilford Nao, DO

## 2019-05-28 DIAGNOSIS — I10 ESSENTIAL HYPERTENSION: ICD-10-CM

## 2019-05-28 DIAGNOSIS — I10 ESSENTIAL HYPERTENSION: Primary | ICD-10-CM

## 2019-05-28 DIAGNOSIS — E11.69 TYPE 2 DIABETES MELLITUS WITH OTHER SPECIFIED COMPLICATION, UNSPECIFIED WHETHER LONG TERM INSULIN USE (HCC): ICD-10-CM

## 2019-05-28 DIAGNOSIS — Z12.5 PROSTATE CANCER SCREENING: ICD-10-CM

## 2019-05-28 DIAGNOSIS — M54.9 CHRONIC BILATERAL BACK PAIN, UNSPECIFIED BACK LOCATION: ICD-10-CM

## 2019-05-28 DIAGNOSIS — G89.29 CHRONIC BILATERAL BACK PAIN, UNSPECIFIED BACK LOCATION: ICD-10-CM

## 2019-05-28 LAB
A/G RATIO: 1.1 (ref 1.1–2.2)
ALBUMIN SERPL-MCNC: 4.3 G/DL (ref 3.4–5)
ALP BLD-CCNC: 51 U/L (ref 40–129)
ALT SERPL-CCNC: 16 U/L (ref 10–40)
ANION GAP SERPL CALCULATED.3IONS-SCNC: 13 MMOL/L (ref 3–16)
AST SERPL-CCNC: 16 U/L (ref 15–37)
BASOPHILS ABSOLUTE: 0.1 K/UL (ref 0–0.2)
BASOPHILS RELATIVE PERCENT: 1 %
BILIRUB SERPL-MCNC: 0.3 MG/DL (ref 0–1)
BUN BLDV-MCNC: 11 MG/DL (ref 7–20)
CALCIUM SERPL-MCNC: 9.7 MG/DL (ref 8.3–10.6)
CHLORIDE BLD-SCNC: 100 MMOL/L (ref 99–110)
CHOLESTEROL, TOTAL: 202 MG/DL (ref 0–199)
CO2: 24 MMOL/L (ref 21–32)
CREAT SERPL-MCNC: 1 MG/DL (ref 0.9–1.3)
EOSINOPHILS ABSOLUTE: 0.1 K/UL (ref 0–0.6)
EOSINOPHILS RELATIVE PERCENT: 2.4 %
GFR AFRICAN AMERICAN: >60
GFR NON-AFRICAN AMERICAN: >60
GLOBULIN: 3.9 G/DL
GLUCOSE BLD-MCNC: 94 MG/DL (ref 70–99)
HCT VFR BLD CALC: 46.1 % (ref 40.5–52.5)
HDLC SERPL-MCNC: 38 MG/DL (ref 40–60)
HEMOGLOBIN: 15.6 G/DL (ref 13.5–17.5)
LDL CHOLESTEROL CALCULATED: 141 MG/DL
LYMPHOCYTES ABSOLUTE: 2.3 K/UL (ref 1–5.1)
LYMPHOCYTES RELATIVE PERCENT: 38.2 %
MCH RBC QN AUTO: 31.9 PG (ref 26–34)
MCHC RBC AUTO-ENTMCNC: 33.9 G/DL (ref 31–36)
MCV RBC AUTO: 93.9 FL (ref 80–100)
MONOCYTES ABSOLUTE: 0.3 K/UL (ref 0–1.3)
MONOCYTES RELATIVE PERCENT: 5.6 %
NEUTROPHILS ABSOLUTE: 3.2 K/UL (ref 1.7–7.7)
NEUTROPHILS RELATIVE PERCENT: 52.8 %
PDW BLD-RTO: 13.1 % (ref 12.4–15.4)
PLATELET # BLD: 254 K/UL (ref 135–450)
PMV BLD AUTO: 8.1 FL (ref 5–10.5)
POTASSIUM SERPL-SCNC: 4.8 MMOL/L (ref 3.5–5.1)
PROSTATE SPECIFIC ANTIGEN: 2.21 NG/ML (ref 0–4)
RBC # BLD: 4.91 M/UL (ref 4.2–5.9)
SODIUM BLD-SCNC: 137 MMOL/L (ref 136–145)
T3 FREE: 3 PG/ML (ref 2.3–4.2)
T4 FREE: 0.8 NG/DL (ref 0.9–1.8)
TOTAL PROTEIN: 8.2 G/DL (ref 6.4–8.2)
TRIGL SERPL-MCNC: 117 MG/DL (ref 0–150)
TSH SERPL DL<=0.05 MIU/L-ACNC: 9.94 UIU/ML (ref 0.27–4.2)
VLDLC SERPL CALC-MCNC: 23 MG/DL
WBC # BLD: 6 K/UL (ref 4–11)

## 2019-05-28 RX ORDER — POLYETHYLENE GLYCOL 3350 17 G/17G
POWDER, FOR SOLUTION ORAL
Qty: 238 G | Refills: 0 | Status: ON HOLD | OUTPATIENT
Start: 2019-05-28 | End: 2019-06-12 | Stop reason: ALTCHOICE

## 2019-05-28 NOTE — LETTER
COLONOSCOPY PREP INSTRUCTIONS  MlRALAX SPLIT DOSE   [facility] PHYSICIAN ENDOSCOPY    Your colonoscopy is scheduled on: 6/12/19   __[place]_  Arrival Time: __9:00 am   DO NOT EAT OR DRINK (INCLUDING WATER) AFTER: _7:00 am_  's Name_[facility] [phone]  [facility]- [phone]    Keep these papers together; REVIEW ALL OF THEM AT LEAST 7 DAYS BEFORE THE PROCEDURE. Please complete all paperwork; including a current list of your medications, to avoid delays in the admission process. The following instructions must be followed in order to ensure your procedure has optimal outcomes. - KEEP YOUR APPOINTMENT. If for any reason, you are unable to keep your appointment, please notify us within 72 hours before your procedure. - You MUST have a responsible adult to drive you, who MUST remain at our facility the ENTIRE time. If not the procedure will be cancelled. You may go by taxi ONLY if you have a responsible adult with you. You may experience light headedness, dizziness etc., therefore you should have a responsible adult remain with you until the morning after your procedure. - Bring your insurance card and 's license. Call your insurance carrier to verify your benefits, and confirm that our facility is in your network, prior to the procedure date to ensure coverage. The facility name is listed as [facility] or [id number]  and the tax ID# is [id number].  - Due to the safety and privacy of our patients, only one visitor is allowed in the recovery area after the procedure. The center will not be responsible for lost valuables so please leave them at home. - Try to avoid seeds (strawberries, donna, and rye) for one week prior to your procedure.   - If you have questions after beginning the prep, call between 8:30 am & have received instructions regarding if and when to discontinue the medication. If you have not, or do not clearly understand the instructions, please call the office for clarification (number listed above). 5. Drink plenty of fluid. 1 day prior to your procedure:  1. Do not eat any SOLID FOOD, beginning with breakfast drink clear liquids only, which includes: Chicken or Beef Broth, Coffee/tea (without milk or creamer) Gatorade/PowerAde (no red or purple), JeIl-O (no red or purple), All Soda (even dark cola), Sorbet/Popsicles (no red or purple), Water If you take Diabetic medications (insulin/oral medication)-reduce the amount by one half on the morning of prep. You may resume the meds once you begin eating again. You must drink 8oz of liquids every hour to avoid dehydration. 2. If you take Diabetic medications (insulin/oral medication) - reduce the amount by one half on morning of prep. You may resume the meds once you begin eating again. 3. Bowel Cleansing Prep  ** 3:00pm Take 4 dulcolax (bisacodyl) tablets with a full glass of water  ** 5:00pm Mix one bottle of Miralax (polyethlene glycol) (238/255gm) in one bottle of Gatorade (64oz). Shake until dissolved. Drink 8 oz every 15 minutes until you have finished half of the solution. MORNING OF PROCEDURE  1. __5:00 am__ (5 hours prior to the procedure) Drink the remaining portion of the solution 8 oz. every 15 minutes until completely finished, followed by 8 oz of any of the approved liquids. 2. Take your Blood pressure, Heart and Seizure medication the morning of the procedure with sips of water. 3. Bring inhalers with you. 4. Do not take your Diabetic medication the morning of procedure. 5. You must have a  stay with you during the entire procedure. Dear Patient,      Juno Saini will receive a call from the [facility] pre-registration department prior to your GI procedure.  This will help streamline your check-in process on the day of service. During this call a skilled associate will review your demographic and insurance benefits information. The associate will answer any question pertaining to your insurance contract, such as deductible/co-insurance/ co-pay or any other financial concerns. They may offer an amount that you could pay on the day of surgery but this is not a requirement. Thank you for allowing [facility] to be part of your [address]  [address] [facility], [address] [facility]      Once you turn into the hospital, turn right (towards [address] go past the [address] youll see a driveway on your left. Make that turn, the sign will say [facility] or Surgery entrance. [phone] [facility]  Go through 2 sets of double doors, sign in at window, you will get registered there and they will send you up the hallway to the Endoscopy area.

## 2019-05-31 LAB
6-ACETYLMORPHINE: NOT DETECTED
7-AMINOCLONAZEPAM: NOT DETECTED
ALPHA-OH-ALPRAZOLAM: NOT DETECTED
ALPRAZOLAM: NOT DETECTED
AMPHETAMINE: NOT DETECTED
BARBITURATES: NOT DETECTED
BENZOYLECGONINE: NOT DETECTED
BUPRENORPHINE: NOT DETECTED
CARISOPRODOL: NOT DETECTED
CLONAZEPAM: NOT DETECTED
CODEINE: NOT DETECTED
CREATININE URINE: 156.4 MG/DL (ref 20–400)
DIAZEPAM: NOT DETECTED
DRUGS EXPECTED: NORMAL
EER PAIN MGT DRUG PANEL, HIGH RES/EMIT U: NORMAL
ETHYL GLUCURONIDE: NOT DETECTED
FENTANYL: NOT DETECTED
HYDROCODONE: NOT DETECTED
HYDROMORPHONE: NOT DETECTED
LORAZEPAM: NOT DETECTED
MARIJUANA METABOLITE: PRESENT
MDA: NOT DETECTED
MDEA: NOT DETECTED
MDMA URINE: NOT DETECTED
MEPERIDINE: NOT DETECTED
METHADONE: NOT DETECTED
METHAMPHETAMINE: NOT DETECTED
METHYLPHENIDATE: NOT DETECTED
MIDAZOLAM: NOT DETECTED
MORPHINE: NOT DETECTED
NORBUPRENORPHINE, FREE: NOT DETECTED
NORDIAZEPAM: NOT DETECTED
NORFENTANYL: NOT DETECTED
NORHYDROCODONE, URINE: NOT DETECTED
NOROXYCODONE: NOT DETECTED
NOROXYMORPHONE, URINE: NOT DETECTED
OXAZEPAM: NOT DETECTED
OXYCODONE: NOT DETECTED
OXYMORPHONE: NOT DETECTED
PAIN MANAGEMENT DRUG PANEL: NORMAL
PAIN MANAGEMENT DRUG PANEL: NORMAL
PCP: NOT DETECTED
PHENTERMINE: NOT DETECTED
PROPOXYPHENE: NOT DETECTED
TAPENTADOL, URINE: NOT DETECTED
TAPENTADOL-O-SULFATE, URINE: NOT DETECTED
TEMAZEPAM: NOT DETECTED
TRAMADOL: NOT DETECTED
ZOLPIDEM: NOT DETECTED

## 2019-06-07 ENCOUNTER — OFFICE VISIT (OUTPATIENT)
Dept: FAMILY MEDICINE CLINIC | Age: 54
End: 2019-06-07
Payer: MEDICARE

## 2019-06-07 VITALS
WEIGHT: 260 LBS | OXYGEN SATURATION: 96 % | HEIGHT: 68 IN | DIASTOLIC BLOOD PRESSURE: 82 MMHG | BODY MASS INDEX: 39.4 KG/M2 | SYSTOLIC BLOOD PRESSURE: 130 MMHG | RESPIRATION RATE: 16 BRPM | TEMPERATURE: 98.4 F | HEART RATE: 79 BPM

## 2019-06-07 DIAGNOSIS — R79.89 ELEVATED TSH: ICD-10-CM

## 2019-06-07 DIAGNOSIS — E03.9 HYPOTHYROIDISM, UNSPECIFIED TYPE: ICD-10-CM

## 2019-06-07 DIAGNOSIS — F33.42 RECURRENT MAJOR DEPRESSIVE DISORDER, IN FULL REMISSION (HCC): ICD-10-CM

## 2019-06-07 DIAGNOSIS — E78.00 ELEVATED LDL CHOLESTEROL LEVEL: ICD-10-CM

## 2019-06-07 DIAGNOSIS — R53.83 FATIGUE, UNSPECIFIED TYPE: Primary | ICD-10-CM

## 2019-06-07 DIAGNOSIS — M25.512 ACUTE PAIN OF LEFT SHOULDER: ICD-10-CM

## 2019-06-07 DIAGNOSIS — M54.50 ACUTE BILATERAL LOW BACK PAIN WITHOUT SCIATICA: ICD-10-CM

## 2019-06-07 PROCEDURE — 3017F COLORECTAL CA SCREEN DOC REV: CPT | Performed by: FAMILY MEDICINE

## 2019-06-07 PROCEDURE — 99214 OFFICE O/P EST MOD 30 MIN: CPT | Performed by: FAMILY MEDICINE

## 2019-06-07 PROCEDURE — G8427 DOCREV CUR MEDS BY ELIG CLIN: HCPCS | Performed by: FAMILY MEDICINE

## 2019-06-07 PROCEDURE — 4004F PT TOBACCO SCREEN RCVD TLK: CPT | Performed by: FAMILY MEDICINE

## 2019-06-07 PROCEDURE — G8417 CALC BMI ABV UP PARAM F/U: HCPCS | Performed by: FAMILY MEDICINE

## 2019-06-07 PROCEDURE — 96372 THER/PROPH/DIAG INJ SC/IM: CPT | Performed by: FAMILY MEDICINE

## 2019-06-07 RX ORDER — KETOROLAC TROMETHAMINE 30 MG/ML
60 INJECTION, SOLUTION INTRAMUSCULAR; INTRAVENOUS ONCE
Status: COMPLETED | OUTPATIENT
Start: 2019-06-07 | End: 2019-06-07

## 2019-06-07 RX ORDER — LEVOTHYROXINE SODIUM 0.03 MG/1
25 TABLET ORAL DAILY
Qty: 30 TABLET | Refills: 3 | Status: SHIPPED | OUTPATIENT
Start: 2019-06-07 | End: 2019-07-02

## 2019-06-07 RX ORDER — MELOXICAM 15 MG/1
15 TABLET ORAL DAILY
Qty: 30 TABLET | Refills: 3 | Status: ON HOLD | OUTPATIENT
Start: 2019-06-07 | End: 2019-06-12 | Stop reason: HOSPADM

## 2019-06-07 RX ADMIN — KETOROLAC TROMETHAMINE 60 MG: 30 INJECTION, SOLUTION INTRAMUSCULAR; INTRAVENOUS at 08:56

## 2019-06-07 ASSESSMENT — ENCOUNTER SYMPTOMS: BACK PAIN: 1

## 2019-06-07 NOTE — PATIENT INSTRUCTIONS
1) Arm: Apply ice, never on bare skin, to sore area, then remove ice for 15 minutes, then apply a heating pad for 15 minutes. Never use a heating pad in bed and make sure that the heating pad is the type that has an automatic shut off. Ice alternating with heat can be used 2-3 times a day for pain. 2) Back:  Apply ice, never on bare skin, to sore area, then remove ice for 15 minutes can be used 2-3 times a day for pain. 3) Wait 2 days then start Mobic.

## 2019-06-07 NOTE — PROGRESS NOTES
6/7/2019    This is a 53 y.o. male   Chief Complaint   Patient presents with    Lower Back Pain     Ongoing since yesterday    Arm Pain     Lft arm pain since he received vaccination on 5/24/19   . HPI  Pt presents for LBP since yesterday and left arm pain since his vaccination on 5/24/19. States that he was at work and his low back began to hurt. Came home and sat then when he tried to stand the pain was very bad. Improving today. Also states his left shoulder hurts so bad that he can't sleep, also can't lift arm, hurts to reach out, began hurting after receiving his pneumonia vaccine on 5/24/19. Previously had numbness in therm from a CVA. Has tried heat, ice, and Tylenol migraine. Also admits to weight gain and feeling hot all the time, not currently on synthroid.   Past Medical History:   Diagnosis Date    CAD (coronary artery disease) 8/25/10    Chronic back pain     Depression 8/25/10    Displacement of cervical intervertebral disc without myelopathy     Gastroesophageal reflux 8/25/10    Hypercholesteremia     Hyperlipidemia 8/25/10    Hypertension     benign essential    Hypogonadism     Hypothyroidism     acquired    Osteoarthritis     Osteoarthritis of spine 8/25/10    Type II or unspecified type diabetes mellitus without mention of complication, not stated as uncontrolled        Past Surgical History:   Procedure Laterality Date    CERVICAL FUSION      CHOLECYSTECTOMY      LUMBAR FUSION         Social History     Socioeconomic History    Marital status:      Spouse name: Not on file    Number of children: Not on file    Years of education: Not on file    Highest education level: Not on file   Occupational History    Not on file   Social Needs    Financial resource strain: Not on file    Food insecurity:     Worry: Not on file     Inability: Not on file    Transportation needs:     Medical: Not on file     Non-medical: Not on file   Tobacco Use    Smoking status: Current Every Day Smoker     Packs/day: 1.50     Years: 30.00     Pack years: 45.00     Types: Cigarettes    Smokeless tobacco: Never Used   Substance and Sexual Activity    Alcohol use: No    Drug use: Yes     Types: Marijuana     Comment: Medical marijuana card     Sexual activity: Not on file   Lifestyle    Physical activity:     Days per week: Not on file     Minutes per session: Not on file    Stress: Not on file   Relationships    Social connections:     Talks on phone: Not on file     Gets together: Not on file     Attends Muslim service: Not on file     Active member of club or organization: Not on file     Attends meetings of clubs or organizations: Not on file     Relationship status: Not on file    Intimate partner violence:     Fear of current or ex partner: Not on file     Emotionally abused: Not on file     Physically abused: Not on file     Forced sexual activity: Not on file   Other Topics Concern    Not on file   Social History Narrative    Not on file       Family History   Problem Relation Age of Onset    Cancer Mother     Heart Disease Father     High Blood Pressure Father        Current Outpatient Medications   Medication Sig Dispense Refill    meloxicam (MOBIC) 15 MG tablet Take 1 tablet by mouth daily 30 tablet 3    levothyroxine (SYNTHROID) 25 MCG tablet Take 1 tablet by mouth Daily 30 tablet 3    polyethylene glycol (MIRALAX) powder Use as directed for colonoscopy prep 238 g 0    bisacodyl (DULCOLAX) 5 MG EC tablet Use as directed for colonoscopy prep 4 tablet 0    Blood Pressure KIT 1 kit by Does not apply route 2 times daily 1 kit 0    varenicline (CHANTIX STARTING MONTH PAK) 0.5 MG X 11 & 1 MG X 42 tablet Take by mouth. 1 box 0    lisinopril (PRINIVIL;ZESTRIL) 20 MG tablet Take 1 tablet by mouth daily 30 tablet 3     No current facility-administered medications for this visit.         Immunization History   Administered Date(s) Administered    Influenza Whole 10/28/2010    Pneumococcal Polysaccharide ([id number]) 05/24/2019       Allergies   Allergen Reactions    Morphine Sulfate      Pt is only allergic to Morphine ER, not IVP Morphine. Orders Only on 05/28/2019   Component Date Value Ref Range Status    PSA 05/28/2019 2.21  0.00 - 4.00 ng/mL Final    T3, Free 05/28/2019 3.0  2.3 - 4.2 pg/mL Final    T4 Free 05/28/2019 0.8* 0.9 - 1.8 ng/dL Final    Sodium 05/28/2019 137  136 - 145 mmol/L Final    Potassium 05/28/2019 4.8  3.5 - 5.1 mmol/L Final    Chloride 05/28/2019 100  99 - 110 mmol/L Final    CO2 05/28/2019 24  21 - 32 mmol/L Final    Anion Gap 05/28/2019 13  3 - 16 Final    Glucose 05/28/2019 94  70 - 99 mg/dL Final    BUN 05/28/2019 11  7 - 20 mg/dL Final    CREATININE 05/28/2019 1.0  0.9 - 1.3 mg/dL Final    GFR Non- 05/28/2019 >60  >60 Final    Comment: >60 mL/min/1.73m2 EGFR, calc. for ages 25 and older using the  MDRD formula (not corrected for weight), is valid for stable  renal function.  GFR  05/28/2019 >60  >60 Final    Comment: Chronic Kidney Disease: less than 60 ml/min/1.73 sq.m. Kidney Failure: less than 15 ml/min/1.73 sq.m. Results valid for patients 18 years and older.       Calcium 05/28/2019 9.7  8.3 - 10.6 mg/dL Final    Total Protein 05/28/2019 8.2  6.4 - 8.2 g/dL Final    Alb 05/28/2019 4.3  3.4 - 5.0 g/dL Final    Albumin/Globulin Ratio 05/28/2019 1.1  1.1 - 2.2 Final    Total Bilirubin 05/28/2019 0.3  0.0 - 1.0 mg/dL Final    Alkaline Phosphatase 05/28/2019 51  40 - 129 U/L Final    ALT 05/28/2019 16  10 - 40 U/L Final    AST 05/28/2019 16  15 - 37 U/L Final    Globulin 05/28/2019 3.9  g/dL Final    WBC 05/28/2019 6.0  4.0 - 11.0 K/uL Final    RBC 05/28/2019 4.91  4.20 - 5.90 M/uL Final    Hemoglobin 05/28/2019 15.6  13.5 - 17.5 g/dL Final    Hematocrit 05/28/2019 46.1  40.5 - 52.5 % Final    MCV 05/28/2019 93.9  80.0 - 100.0 fL Final    MCH 05/28/2019 31.9  26.0 Musculoskeletal: He exhibits tenderness (left shoulder). Decreased left UE ROM   Neurological: He is alert and oriented to person, place, and time. Psychiatric: He has a normal mood and affect. His behavior is normal. Judgment and thought content normal.   Vitals reviewed. Plan   Diagnosis Orders   1. Fatigue, unspecified type  Testosterone, free, total    TSH without Reflex    T4, Free   2. Acute bilateral low back pain without sciatica  Improving   3. Acute pain of left shoulder  Mobic   4. Elevated TSH  TSH without Reflex    T4, Free   5. Hypothyroidism, unspecified type     6. Elevated LDL cholesterol                                         Lipid panel in 3 months        Prior to Visit Medications    Medication Sig Taking? Authorizing Provider   meloxicam (MOBIC) 15 MG tablet Take 1 tablet by mouth daily Yes Guerrero Gill DO   levothyroxine (SYNTHROID) 25 MCG tablet Take 1 tablet by mouth Daily Yes Guerrero Gill DO   polyethylene glycol (MIRALAX) powder Use as directed for colonoscopy prep Yes Leslie Vanessa MD   bisacodyl (DULCOLAX) 5 MG EC tablet Use as directed for colonoscopy prep Yes Leslie Vanessa MD   Blood Pressure KIT 1 kit by Does not apply route 2 times daily Yes Guerrero Gill DO   varenicline (CHANTIX STARTING MONTH PAK) 0.5 MG X 11 & 1 MG X 42 tablet Take by mouth.  Yes Guerrero Gill DO   lisinopril (PRINIVIL;ZESTRIL) 20 MG tablet Take 1 tablet by mouth daily Yes Guerrero Gill DO

## 2019-06-11 ENCOUNTER — TELEPHONE (OUTPATIENT)
Dept: GASTROENTEROLOGY | Age: 54
End: 2019-06-11

## 2019-06-12 ENCOUNTER — HOSPITAL ENCOUNTER (OUTPATIENT)
Age: 54
Setting detail: OUTPATIENT SURGERY
Discharge: HOME | End: 2019-06-12
Attending: INTERNAL MEDICINE | Admitting: INTERNAL MEDICINE
Payer: MEDICARE

## 2019-06-12 ENCOUNTER — ANESTHESIA (OUTPATIENT)
Dept: ENDOSCOPY | Age: 54
End: 2019-06-12
Payer: MEDICARE

## 2019-06-12 ENCOUNTER — ANESTHESIA EVENT (OUTPATIENT)
Dept: ENDOSCOPY | Age: 54
End: 2019-06-12
Payer: MEDICARE

## 2019-06-12 VITALS
RESPIRATION RATE: 16 BRPM | OXYGEN SATURATION: 98 % | BODY MASS INDEX: 38.65 KG/M2 | WEIGHT: 255 LBS | HEIGHT: 68 IN | SYSTOLIC BLOOD PRESSURE: 143 MMHG | TEMPERATURE: 97.7 F | HEART RATE: 60 BPM | DIASTOLIC BLOOD PRESSURE: 87 MMHG

## 2019-06-12 PROBLEM — Z12.11 SCREEN FOR COLON CANCER: Status: ACTIVE | Noted: 2019-06-12

## 2019-06-12 PROBLEM — K62.1 POLYP OF RECTUM: Status: ACTIVE | Noted: 2019-06-12

## 2019-06-12 PROCEDURE — 3609010600 HC COLONOSCOPY POLYPECTOMY SNARE/COLD BIOPSY: Performed by: INTERNAL MEDICINE

## 2019-06-12 PROCEDURE — 45385 COLONOSCOPY W/LESION REMOVAL: CPT | Performed by: INTERNAL MEDICINE

## 2019-06-12 PROCEDURE — 7100000010 HC PHASE II RECOVERY - FIRST 15 MIN: Performed by: INTERNAL MEDICINE

## 2019-06-12 PROCEDURE — 99152 MOD SED SAME PHYS/QHP 5/>YRS: CPT | Performed by: INTERNAL MEDICINE

## 2019-06-12 PROCEDURE — 6360000002 HC RX W HCPCS: Performed by: INTERNAL MEDICINE

## 2019-06-12 PROCEDURE — 88305 TISSUE EXAM BY PATHOLOGIST: CPT

## 2019-06-12 PROCEDURE — 7100000011 HC PHASE II RECOVERY - ADDTL 15 MIN: Performed by: INTERNAL MEDICINE

## 2019-06-12 PROCEDURE — 2709999900 HC NON-CHARGEABLE SUPPLY: Performed by: INTERNAL MEDICINE

## 2019-06-12 RX ORDER — MIDAZOLAM HYDROCHLORIDE 5 MG/ML
INJECTION INTRAMUSCULAR; INTRAVENOUS PRN
Status: DISCONTINUED | OUTPATIENT
Start: 2019-06-12 | End: 2019-06-12 | Stop reason: ALTCHOICE

## 2019-06-12 RX ORDER — FENTANYL CITRATE 50 UG/ML
INJECTION, SOLUTION INTRAMUSCULAR; INTRAVENOUS PRN
Status: DISCONTINUED | OUTPATIENT
Start: 2019-06-12 | End: 2019-06-12 | Stop reason: ALTCHOICE

## 2019-06-12 RX ORDER — DIPHENHYDRAMINE HYDROCHLORIDE 50 MG/ML
INJECTION INTRAMUSCULAR; INTRAVENOUS PRN
Status: DISCONTINUED | OUTPATIENT
Start: 2019-06-12 | End: 2019-06-12 | Stop reason: ALTCHOICE

## 2019-06-12 RX ORDER — SODIUM CHLORIDE, SODIUM LACTATE, POTASSIUM CHLORIDE, CALCIUM CHLORIDE 600; 310; 30; 20 MG/100ML; MG/100ML; MG/100ML; MG/100ML
INJECTION, SOLUTION INTRAVENOUS CONTINUOUS
Status: DISCONTINUED | OUTPATIENT
Start: 2019-06-12 | End: 2019-06-12 | Stop reason: HOSPADM

## 2019-06-12 ASSESSMENT — PAIN - FUNCTIONAL ASSESSMENT: PAIN_FUNCTIONAL_ASSESSMENT: 0-10

## 2019-06-12 NOTE — H&P
[address] ,  [address] [place]  Phone: [phone]    CHIEF COMPLAINT     Here for screening colonoscopy. CHRISTOPHER Francisco. is a 53 y.o. male who presents for screening colonoscopy. Denies GI symptoms. Denies family history of colon cancer in first degree family members.       PAST MEDICAL HISTORY     Past Medical History:   Diagnosis Date    CAD (coronary artery disease) 8/25/10    Chronic back pain     Depression 8/25/10    Displacement of cervical intervertebral disc without myelopathy     Gastroesophageal reflux 8/25/10    Hypercholesteremia     Hyperlipidemia 8/25/10    Hypertension     benign essential    Hypogonadism     Hypothyroidism     acquired    Osteoarthritis     Osteoarthritis of spine 8/25/10     FAMILY HISTORY     Family History   Problem Relation Age of Onset    Cancer Mother     Heart Disease Father     High Blood Pressure Father      SOCIAL HISTORY     Social History     Socioeconomic History    Marital status:      Spouse name: Not on file    Number of children: Not on file    Years of education: Not on file    Highest education level: Not on file   Occupational History    Not on file   Social Needs    Financial resource strain: Not on file    Food insecurity:     Worry: Not on file     Inability: Not on file    Transportation needs:     Medical: Not on file     Non-medical: Not on file   Tobacco Use    Smoking status: Current Every Day Smoker     Packs/day: 1.00     Years: 30.00     Pack years: 30.00     Types: Cigarettes    Smokeless tobacco: Never Used   Substance and Sexual Activity    Alcohol use: No    Drug use: Yes     Types: Marijuana     Comment: Medical marijuana card     Sexual activity: Not on file   Lifestyle    Physical activity:     Days per week: Not on file     Minutes per session: Not on file    Stress: Not on file   Relationships    Social connections: Talks on phone: Not on file     Gets together: Not on file     Attends Congregation service: Not on file     Active member of club or organization: Not on file     Attends meetings of clubs or organizations: Not on file     Relationship status: Not on file    Intimate partner violence:     Fear of current or ex partner: Not on file     Emotionally abused: Not on file     Physically abused: Not on file     Forced sexual activity: Not on file   Other Topics Concern    Not on file   Social History Narrative    Not on file     SURGICAL HISTORY     Past Surgical History:   Procedure Laterality Date    CERVICAL FUSION     [address]     No current facility-administered medications on file prior to encounter. Current Outpatient Medications on File Prior to Encounter   Medication Sig Dispense Refill    medical marijuana Take by mouth as needed.  varenicline (CHANTIX STARTING MONTH PAK) 0.5 MG X 11 & 1 MG X 42 tablet Take by mouth. 1 box 0    lisinopril (PRINIVIL;ZESTRIL) 20 MG tablet Take 1 tablet by mouth daily 30 tablet 3    Blood Pressure KIT 1 kit by Does not apply route 2 times daily 1 kit 0     ALLERGIES     Allergies   Allergen Reactions    Morphine Sulfate      Pt is only allergic to Morphine ER, not IVP Morphine. IMMUNIZATIONS     Immunization History   Administered Date(s) Administered    Influenza Whole 10/28/2010    Pneumococcal Polysaccharide ([id number]) 05/24/2019     REVIEW OF SYSTEMS     Constitutional: Negative for appetite change, chills, fatigue, fever and unexpected weight change. HENT: Negative for ear pain, hearing loss and nosebleeds. Eyes: Negative for pain and visual disturbance. Respiratory: Negative for cough, shortness of breath and wheezing. Cardiovascular: Negative for chest pain, palpitations and leg swelling. Gastrointestinal: see HPI for details.   Endocrine: Negative for polydipsia,

## 2019-06-12 NOTE — PROGRESS NOTES
Recovery completed,dsicharge instructions given to pt and friend,verbalize understanding. Pt discharged home stable condition.

## 2019-06-12 NOTE — ANESTHESIA PRE PROCEDURE
Department of Anesthesiology  Preprocedure Note       Name:  Omid Deutsch. Age:  53 y.o.  :  1965                                          MRN:  [id number]         Date:  2019      Surgeon: Shaggy Reynolds):  Frantz Del Rosario MD    Procedure: COLON (10:00) (N/A )    Medications prior to admission:   Prior to Admission medications    Medication Sig Start Date End Date Taking? Authorizing Provider   medical marijuana Take by mouth as needed. Yes Historical Provider, MD   meloxicam (MOBIC) 15 MG tablet Take 1 tablet by mouth daily 19  Yes Sherly Bare, DO   levothyroxine (SYNTHROID) 25 MCG tablet Take 1 tablet by mouth Daily 19  Yes Sherly Bare, DO   varenicline (CHANTIX STARTING MONTH ) 0.5 MG X 11 & 1 MG X 42 tablet Take by mouth. 5/10/19  Yes Sherly Bare, DO   lisinopril (PRINIVIL;ZESTRIL) 20 MG tablet Take 1 tablet by mouth daily 5/10/19  Yes Sherly Carlson, DO   Blood Pressure KIT 1 kit by Does not apply route 2 times daily 19   Sherly Bare, DO       Current medications:    Current Facility-Administered Medications   Medication Dose Route Frequency Provider Last Rate Last Dose    lactated ringers infusion   Intravenous Continuous Frantz Del Rosario MD           Allergies: Allergies   Allergen Reactions    Morphine Sulfate      Pt is only allergic to Morphine ER, not IVP Morphine.        Problem List:    Patient Active Problem List   Diagnosis Code    Diabetes mellitus ([facility] [address] E11.9    Hyperlipidemia E78.5    Hypothyroid E03.9    Hypertension I10    Hypogonadism     Depression F32.9       Past Medical History:        Diagnosis Date    CAD (coronary artery disease) 8/25/10    Chronic back pain     Depression 8/25/10    Displacement of cervical intervertebral disc without myelopathy     Gastroesophageal reflux 8/25/10    Hypercholesteremia     Hyperlipidemia 8/25/10    Hypertension     benign essential    Hypogonadism     Hypothyroidism     acquired    Osteoarthritis     Osteoarthritis of spine 8/25/10       Past Surgical History:        Procedure Laterality Date    CERVICAL FUSION      CHOLECYSTECTOMY      HERNIA REPAIR      LUMBAR FUSION         Social History:    Social History     Tobacco Use    Smoking status: Current Every Day Smoker     Packs/day: 1.00     Years: 30.00     Pack years: 30.00     Types: Cigarettes    Smokeless tobacco: Never Used   Substance Use Topics    Alcohol use: No                                Ready to quit: Not Answered  Counseling given: Not Answered      Vital Signs (Current):   Vitals:    06/12/19 0928   BP: 116/76   Pulse: 71   Resp: 16   Temp: 97.4 °F (36.3 °C)   TempSrc: Temporal   SpO2: 100%   Weight: 255 lb (115.7 kg)   Height: 5' 8\" (1.727 m)                                              BP Readings from Last 3 Encounters:   06/12/19 116/76   06/07/19 130/82   05/24/19 132/68       NPO Status: Time of last liquid consumption: 0600                        Time of last solid consumption: 1900                        Date of last liquid consumption: 06/12/19                        Date of last solid food consumption: 06/10/19    BMI:   Wt Readings from Last 3 Encounters:   06/12/19 255 lb (115.7 kg)   06/07/19 260 lb (117.9 kg)   05/24/19 258 lb 9.6 oz (117.3 kg)     Body mass index is 38.77 kg/m².     CBC:   Lab Results   Component Value Date    WBC 6.0 05/28/2019    RBC 4.91 05/28/2019    HGB 15.6 05/28/2019    HCT 46.1 05/28/2019    MCV 93.9 05/28/2019    RDW 13.1 05/28/2019     05/28/2019       CMP:   Lab Results   Component Value Date     05/28/2019    K 4.8 05/28/2019     05/28/2019    CO2 24 05/28/2019    BUN 11 05/28/2019    CREATININE 1.0 05/28/2019    GFRAA >60 05/28/2019    AGRATIO 1.1 05/28/2019    LABGLOM >60 05/28/2019    LABGLOM 80 05/19/2011    GLUCOSE 94 05/28/2019    GLUCOSE 89 05/19/2011    PROT 8.2 05/28/2019    PROT 8.0 05/19/2011    CALCIUM 9.7 05/28/2019    BILITOT 0.3 05/28/2019    ALKPHOS 51 05/28/2019    AST 16 05/28/2019    ALT 16 05/28/2019       POC Tests: No results for input(s): POCGLU, POCNA, POCK, POCCL, POCBUN, POCHEMO, POCHCT in the last 72 hours. Coags: No results found for: PROTIME, INR, APTT    HCG (If Applicable): No results found for: PREGTESTUR, PREGSERUM, HCG, HCGQUANT     ABGs: No results found for: PHART, PO2ART, QLO1EPK, IXJ5VXC, BEART, I3KCFHGT     Type & Screen (If Applicable):  No results found for: LABABO, [address]    Anesthesia Evaluation  Patient summary reviewed and Nursing notes reviewed no history of anesthetic complications:   Airway: Mallampati: III     Neck ROM: full   Dental:          Pulmonary:                              Cardiovascular:    (+) hypertension:, CAD:,                   Neuro/Psych:   (+) psychiatric history:            GI/Hepatic/Renal:   (+) GERD:, morbid obesity          Endo/Other:    (+) Diabetes, hypothyroidism::., .                 Abdominal:           Vascular:                                        Anesthesia Plan      MAC     ASA 3     (Medications & allergies reviewed  All available lab & EKG data reviewed)  Induction: intravenous. Anesthetic plan and risks discussed with patient. Plan discussed with CRNA.                   Liset Rascon MD   6/12/2019

## 2019-06-12 NOTE — OP NOTE
[address] ,  [address] [facility]  Phone: [phone]   OPZ:[phone]    Colonoscopy Procedure Note    Patient: Medhat Frost. : 1965    Procedure: Colonoscopy with --screening    Date:  2019     Endoscopist:  Lore House MD    Referring Physician:  Corazon Rodriguez DO    Preoperative Diagnosis:  SCREENING    Postoperative Diagnosis:  See impression    Anesthesia: Anesthesia: Moderate Sedation  Sedation: Versed 5 mg IV, fentanyl 100 mcg IV, Benadryl 50 mg IV  Start Time: 10:11  Stop Time: 10:30  Withdrawal time: 15 minutes  ASA Class: 2  Mallampati: II (soft palate, uvula, fauces visible)    Indications: This is a 53y.o. year old male who presents today with screening for colon cancer. Procedure Details  Informed consent was obtained for the procedure, including sedation. Risks of perforation, hemorrhage, adverse drug reaction and aspiration were discussed. The patient was placed in the left lateral decubitus position. Based on the pre-procedure assessment, including review of the patient's medical history, medications, allergies, and review of systems, he had been deemed to be an appropriate candidate for conscious sedation; he was therefore sedated with the medications listed below. The patient was monitored continuously with ECG tracing, pulse oximetry, blood pressure monitoring, and direct observations. rectal examination was performed. The colonoscope was inserted into the rectum and advanced under direct vision to the cecum, which was identified by the ileocecal valve and appendiceal orifice. The quality of the colonic preparation was good. A careful inspection was made as the colonoscope was withdrawn, including a retroflexed view of the rectum; findings and interventions are described below. Appropriate photodocumentation was obtained. Patient tolerated the procedure well.     Findings: -There was a 8 mm sessile distal rectal polyp removed with hot snare. One 6 mm sessile rectal polyp removed with cold forceps. Otherwise normal colonoscopy to the cecum.    - Anesthesia issues: no    Specimens: Was Obtained: bottle A, rectum, 2 polyps    Complications:   None    Estimated blood loss: minimal    Disposition:   PACU - hemodynamically stable. Impression:   -There was a 8 mm sessile distal rectal polyp removed with hot snare. One 6 mm sessile rectal polyp removed with cold forceps. Otherwise normal colonoscopy to the cecum. Recommendations:  -Await pathology.   - If adenomatous repeat colonoscopy in 5 years  - Avoid all NSAIDs for 1 week        Kam Boyle 6/12/19 9:54 AM

## 2019-06-13 DIAGNOSIS — M54.9 CHRONIC BILATERAL BACK PAIN, UNSPECIFIED BACK LOCATION: Primary | ICD-10-CM

## 2019-06-13 DIAGNOSIS — G89.29 CHRONIC BILATERAL BACK PAIN, UNSPECIFIED BACK LOCATION: Primary | ICD-10-CM

## 2019-06-13 RX ORDER — TIZANIDINE 4 MG/1
4 TABLET ORAL 3 TIMES DAILY
Qty: 42 TABLET | Refills: 0 | Status: SHIPPED | OUTPATIENT
Start: 2019-06-13 | End: 2019-06-27

## 2019-06-13 NOTE — TELEPHONE ENCOUNTER
Please let patient know that I have called in Zanaflex to help relax the muscles in his low back. I would also like for him to use ice on his back, never on bare skin, for 15 minutes then off for 15 minutes, then heat for 15 minutes. Thank you.

## 2019-06-19 ENCOUNTER — TELEPHONE (OUTPATIENT)
Dept: FAMILY MEDICINE CLINIC | Age: 54
End: 2019-06-19

## 2019-06-19 DIAGNOSIS — Z72.0 TOBACCO USE: Primary | ICD-10-CM

## 2019-06-19 RX ORDER — VARENICLINE TARTRATE 1 MG/1
1 TABLET, FILM COATED ORAL 2 TIMES DAILY
Qty: 60 TABLET | Refills: 3 | Status: SHIPPED | OUTPATIENT
Start: 2019-06-19 | End: 2019-08-07 | Stop reason: SDUPTHER

## 2019-06-25 ENCOUNTER — OFFICE VISIT (OUTPATIENT)
Dept: FAMILY MEDICINE CLINIC | Age: 54
End: 2019-06-25
Payer: MEDICARE

## 2019-06-25 VITALS
SYSTOLIC BLOOD PRESSURE: 132 MMHG | DIASTOLIC BLOOD PRESSURE: 84 MMHG | BODY MASS INDEX: 40.63 KG/M2 | RESPIRATION RATE: 18 BRPM | OXYGEN SATURATION: 97 % | WEIGHT: 267.2 LBS | HEART RATE: 65 BPM

## 2019-06-25 DIAGNOSIS — M54.41 CHRONIC MIDLINE LOW BACK PAIN WITH RIGHT-SIDED SCIATICA: ICD-10-CM

## 2019-06-25 DIAGNOSIS — G89.29 CHRONIC MIDLINE LOW BACK PAIN WITH RIGHT-SIDED SCIATICA: ICD-10-CM

## 2019-06-25 DIAGNOSIS — E03.9 HYPOTHYROIDISM, UNSPECIFIED TYPE: ICD-10-CM

## 2019-06-25 DIAGNOSIS — Z00.00 ANNUAL PHYSICAL EXAM: Primary | ICD-10-CM

## 2019-06-25 DIAGNOSIS — R53.83 FATIGUE, UNSPECIFIED TYPE: ICD-10-CM

## 2019-06-25 DIAGNOSIS — R07.9 CHEST PAIN, UNSPECIFIED TYPE: ICD-10-CM

## 2019-06-25 LAB
T4 FREE: 0.9 NG/DL (ref 0.9–1.8)
TSH SERPL DL<=0.05 MIU/L-ACNC: 5.98 UIU/ML (ref 0.27–4.2)

## 2019-06-25 PROCEDURE — G0439 PPPS, SUBSEQ VISIT: HCPCS | Performed by: FAMILY MEDICINE

## 2019-06-25 ASSESSMENT — ENCOUNTER SYMPTOMS
COUGH: 0
COLOR CHANGE: 0
BACK PAIN: 1
SHORTNESS OF BREATH: 0
ABDOMINAL PAIN: 1

## 2019-06-25 NOTE — PROGRESS NOTES
Indiana Guillen YOB: 1965    Date of Service:  6/25/2019    Chief Complaint:   Indiana Guillen is a 53 y.o. male who presents for complete physical examination. HPI:  HPI    Self-testicular exams: Yes  Sexual activity: none   Last eye exam: 6 months ago,normal  Exercise: physical work 4 days a week  Seatbelt use: no  Are You a Spiritual person: yes  Living will: has had discussion with his daughter    Wt Readings from Last 3 Encounters:   06/25/19 267 lb 3.2 oz (121.2 kg)   06/12/19 255 lb (115.7 kg)   06/07/19 260 lb (117.9 kg)     BP Readings from Last 3 Encounters:   06/25/19 132/84   06/12/19 (!) 143/87   06/07/19 130/82       Patient Active Problem List   Diagnosis    Diabetes mellitus ([facility] [address]    Hyperlipidemia    Hypothyroid    Hypertension    Hypogonadism    Depression    Screen for colon cancer    Polyp of rectum       Health Maintenance   Topic Date Due    Hepatitis C screen  1965    Diabetic foot exam  03/20/1974    Diabetic retinal exam  03/20/1974    HIV screen  03/20/1979    Diabetic microalbuminuria test  03/20/1982    Hepatitis B Vaccine (1 of 3 - Risk 3-dose series) 03/20/1983    DTaP/Tdap/Td vaccine (1 - Tdap) 03/20/1983    Shingles Vaccine (1 of 2) 03/20/2014    Low dose CT lung screening  03/20/2019    Flu vaccine (Season Ended) 09/01/2019    A1C test (Diabetic or Prediabetic)  05/10/2020    Lipid screen  05/28/2020    TSH testing  05/28/2020    Potassium monitoring  05/28/2020    Creatinine monitoring  05/28/2020    Colon cancer screen colonoscopy  06/12/2024    Annual Wellness Visit (AWV)  03/20/2027    Pneumococcal 0-64 years Vaccine  Completed       Immunization History   Administered Date(s) Administered    Influenza Whole 10/28/2010    Pneumococcal Polysaccharide ([id number]) 05/24/2019       Allergies   Allergen Reactions    Morphine Sulfate      Pt is only allergic to Morphine ER, not IVP Morphine.      Outpatient Medications Marked as Taking for the 6/25/19 encounter (Office Visit) with Corazon Rodriguez, DO   Medication Sig Dispense Refill    varenicline (CHANTIX CONTINUING MONTH ESAU) 1 MG tablet Take 1 tablet by mouth 2 times daily 60 tablet 3    tiZANidine (ZANAFLEX) 4 MG tablet Take 1 tablet by mouth 3 times daily for 14 days 42 tablet 0    medical marijuana Take by mouth as needed.       levothyroxine (SYNTHROID) 25 MCG tablet Take 1 tablet by mouth Daily (Patient taking differently: Take 25 mcg by mouth 2 times daily ) 30 tablet 3    Blood Pressure KIT 1 kit by Does not apply route 2 times daily 1 kit 0    lisinopril (PRINIVIL;ZESTRIL) 20 MG tablet Take 1 tablet by mouth daily 30 tablet 3       Past Medical History:   Diagnosis Date    CAD (coronary artery disease) 8/25/10    Chronic back pain     Depression 8/25/10    Displacement of cervical intervertebral disc without myelopathy     Gastroesophageal reflux 8/25/10    Hypercholesteremia     Hyperlipidemia 8/25/10    Hypertension     benign essential    Hypogonadism     Hypothyroidism     acquired    Osteoarthritis     Osteoarthritis of spine 8/25/10     Past Surgical History:   Procedure Laterality Date    CERVICAL FUSION      CHOLECYSTECTOMY      COLONOSCOPY N/A 6/12/2019    COLONOSCOPY POLYPECTOMY SNARE/COLD BIOPSY performed by Lore House MD at [address]       Family History   Problem Relation Age of Onset    Cancer Mother     Heart Disease Father     High Blood Pressure Father      Social History     Socioeconomic History    Marital status:      Spouse name: Not on file    Number of children: Not on file    Years of education: Not on file    Highest education level: Not on file   Occupational History    Not on file   Social Needs    Financial resource strain: Not on file    Food insecurity:     Worry: Not on file     Inability: Not on file    Transportation needs: (ZANAFLEX) 4 MG tablet Take 1 tablet by mouth 3 times daily for 14 days Yes Latanya Calderon DO   medical marijuana Take by mouth as needed.  Yes Historical Provider, MD   levothyroxine (SYNTHROID) 25 MCG tablet Take 1 tablet by mouth Daily  Patient taking differently: Take 25 mcg by mouth 2 times daily  Yes Latanya Calderon DO   Blood Pressure KIT 1 kit by Does not apply route 2 times daily Yes Latanya Calderon DO   lisinopril (PRINIVIL;ZESTRIL) 20 MG tablet Take 1 tablet by mouth daily Yes Latanya Calderon DO

## 2019-06-26 DIAGNOSIS — E11.42 DIABETIC POLYNEUROPATHY ASSOCIATED WITH TYPE 2 DIABETES MELLITUS (HCC): Primary | ICD-10-CM

## 2019-06-26 RX ORDER — PREGABALIN 150 MG/1
150 CAPSULE ORAL 2 TIMES DAILY
Qty: 60 CAPSULE | Refills: 0 | Status: SHIPPED | OUTPATIENT
Start: 2019-06-26 | End: 2019-09-05 | Stop reason: SDUPTHER

## 2019-06-27 LAB
SEX HORMONE BINDING GLOBULIN: 27 NMOL/L (ref 11–80)
TESTOSTERONE FREE-NONMALE: 69 PG/ML (ref 47–244)
TESTOSTERONE TOTAL: 312 NG/DL (ref 220–1000)

## 2019-06-28 ENCOUNTER — TELEPHONE (OUTPATIENT)
Dept: FAMILY MEDICINE CLINIC | Age: 54
End: 2019-06-28

## 2019-06-28 DIAGNOSIS — E03.9 HYPOTHYROIDISM, UNSPECIFIED TYPE: Primary | ICD-10-CM

## 2019-06-28 NOTE — TELEPHONE ENCOUNTER
Pt called for his lab results. Please call back. Pt said he is waiting on the results to schedule appts.

## 2019-07-02 ENCOUNTER — TELEPHONE (OUTPATIENT)
Dept: FAMILY MEDICINE CLINIC | Age: 54
End: 2019-07-02

## 2019-07-02 DIAGNOSIS — E03.9 HYPOTHYROIDISM, UNSPECIFIED TYPE: Primary | ICD-10-CM

## 2019-07-02 RX ORDER — LEVOTHYROXINE SODIUM 0.05 MG/1
50 TABLET ORAL DAILY
Qty: 30 TABLET | Refills: 5 | Status: SHIPPED | OUTPATIENT
Start: 2019-07-02 | End: 2019-08-07 | Stop reason: SDUPTHER

## 2019-07-12 PROBLEM — Z12.11 SCREEN FOR COLON CANCER: Status: RESOLVED | Noted: 2019-06-12 | Resolved: 2019-07-12

## 2019-07-16 ENCOUNTER — OFFICE VISIT (OUTPATIENT)
Dept: ORTHOPEDIC SURGERY | Age: 54
End: 2019-07-16
Payer: MEDICARE

## 2019-07-16 VITALS
DIASTOLIC BLOOD PRESSURE: 68 MMHG | SYSTOLIC BLOOD PRESSURE: 118 MMHG | BODY MASS INDEX: 40.01 KG/M2 | HEART RATE: 63 BPM | WEIGHT: 264 LBS | RESPIRATION RATE: 16 BRPM | HEIGHT: 68 IN

## 2019-07-16 DIAGNOSIS — M47.814 SPONDYLOSIS OF THORACIC REGION WITHOUT MYELOPATHY OR RADICULOPATHY: ICD-10-CM

## 2019-07-16 DIAGNOSIS — M51.34 DDD (DEGENERATIVE DISC DISEASE), THORACIC: ICD-10-CM

## 2019-07-16 DIAGNOSIS — M54.50 LUMBAR PAIN: ICD-10-CM

## 2019-07-16 DIAGNOSIS — M96.1 CERVICAL POST-LAMINECTOMY SYNDROME: Primary | ICD-10-CM

## 2019-07-16 DIAGNOSIS — M96.1 LUMBAR POST-LAMINECTOMY SYNDROME: ICD-10-CM

## 2019-07-16 DIAGNOSIS — G89.4 CHRONIC PAIN SYNDROME: ICD-10-CM

## 2019-07-16 PROCEDURE — 99204 OFFICE O/P NEW MOD 45 MIN: CPT | Performed by: PHYSICAL MEDICINE & REHABILITATION

## 2019-07-16 PROCEDURE — G8417 CALC BMI ABV UP PARAM F/U: HCPCS | Performed by: PHYSICAL MEDICINE & REHABILITATION

## 2019-07-16 PROCEDURE — G8427 DOCREV CUR MEDS BY ELIG CLIN: HCPCS | Performed by: PHYSICAL MEDICINE & REHABILITATION

## 2019-07-16 NOTE — PROGRESS NOTES
smokeless tobacco. He reports that he has current or past drug history. Drug: Marijuana. He reports that he does not drink alcohol. Family History:   Family History   Problem Relation Age of Onset    Cancer Mother     Heart Disease Father     High Blood Pressure Father          REVIEW OF SYSTEMS: Full ROS reviewed & scanned from 7/16/2019           PHYSICAL EXAM:    Vitals: Blood pressure 118/68, pulse 63, resp. rate 16, height 5' 8\" (1.727 m), weight 264 lb (119.7 kg). GENERAL EXAM:  · General Apparence: Patient is adequately groomed with no evidence of malnutrition. · Psychiatric: Orientation: The patient is oriented to time, place and person. The patient's mood and affect are appropriate   · Vascular: Examination reveals no swelling and palpation reveals no tenderness in upper or lower extremities. Good capillary refill. · The lymphatic examination of the neck, axillae and groin reveals all areas to be without enlargement or induration   Sensation is intact without deficit in the upper and lower extremities to light touch and pinprick  · Coordination of the upper and lower extremities are normal.    CERVICAL EXAMINATION:  · Inspection: Local inspection shows no step-off or bruising. Cervical alignment is normal. No instability is noted. · Palpation and Percussion: No evidence of tenderness at the midline. Paraspinal tenderness is not present. There is no paraspinal spasm. · Range of Motion:  limited by 50% in all planes due to pain   · Strength: 5/5 bilateral upper extremities  · Special Tests:   Spurling's and Sahu's are negative bilaterally. Vega and Impingement tests are negative bilaterally. · Skin:There are no rashes, ulcerations or lesions. · Reflexes: Bilaterally triceps, biceps and brachioradialis are 2+. Clonus absent bilaterally at the feet. No pathological reflexes are noted.   · Gait & station:  normal, patient ambulates without assistance and no ataxia  · Additional is no gross instability. There are no rashes, ulcerations or lesions. Strength and tone are normal. No atrophy or abnormal movements are noted. Diagnostic Testing:    Xrays:   AP and lateral of the lumbar spine taken today in the office show L4-S1 fusion, lumbar DDD  MRI or CT:  None  EMG:  None  Results for orders placed or performed in visit on 19   Testosterone, free, total   Result Value Ref Range    Testosterone 312 220 - 1,000 ng/dL    Sex Hormone Binding 27 11 - 80 nmol/L    Testosterone, Free 69.0 47 - 244 pg/mL   TSH without Reflex   Result Value Ref Range    TSH 5.98 (H) 0.27 - 4.20 uIU/mL   T4, Free   Result Value Ref Range    T4 Free 0.9 0.9 - 1.8 ng/dL       Impression (Medical Decision Making):       1. Cervical post-laminectomy syndrome    2. Lumbar pain    3. Spondylosis of thoracic region without myelopathy or radiculopathy    4. DDD (degenerative disc disease), thoracic    5. Lumbar post-laminectomy syndrome    6. Chronic pain syndrome        Plan (Medical Decision Making):    I discussed the diagnosis and the treatment options with Annelle Cabot. today. In Summary:  The various treatment options were outlined and discussed with Annelle Cabot. including:  Conservative care options: physical therapy, ice, medications, bracing, and activity modification. The indications for therapeutic injections. The indications for additional imaging/laboratory studies. The indications for (possible future) interventions. After considering the various options discussed, Annelle Cabot. elected to pursue a course of treatment that includes the followin. Medications: No further recommendations for new medications. 2. PT:  Encouraged to continue with HEP. 3. Further studies: MR Lumbar spine      4. Interventional:  After further imaging is obtained, interventional options will be reviewed and recommended.     5. Healthy Lifestyle Measures:  Patient education

## 2019-07-23 ENCOUNTER — TELEPHONE (OUTPATIENT)
Dept: FAMILY MEDICINE CLINIC | Age: 54
End: 2019-07-23

## 2019-07-23 DIAGNOSIS — N52.9 ERECTILE DYSFUNCTION, UNSPECIFIED ERECTILE DYSFUNCTION TYPE: Primary | ICD-10-CM

## 2019-07-26 ENCOUNTER — HOSPITAL ENCOUNTER (OUTPATIENT)
Dept: MRI IMAGING | Age: 54
Discharge: HOME | End: 2019-07-26
Payer: MEDICARE

## 2019-07-26 DIAGNOSIS — M47.814 SPONDYLOSIS OF THORACIC REGION WITHOUT MYELOPATHY OR RADICULOPATHY: ICD-10-CM

## 2019-07-26 PROCEDURE — 72148 MRI LUMBAR SPINE W/O DYE: CPT

## 2019-08-06 ENCOUNTER — TELEPHONE (OUTPATIENT)
Dept: ORTHOPEDIC SURGERY | Age: 54
End: 2019-08-06

## 2019-08-06 ENCOUNTER — OFFICE VISIT (OUTPATIENT)
Dept: ORTHOPEDIC SURGERY | Age: 54
End: 2019-08-06
Payer: MEDICARE

## 2019-08-06 VITALS
HEIGHT: 68 IN | WEIGHT: 263.89 LBS | DIASTOLIC BLOOD PRESSURE: 86 MMHG | BODY MASS INDEX: 39.99 KG/M2 | HEART RATE: 70 BPM | SYSTOLIC BLOOD PRESSURE: 142 MMHG

## 2019-08-06 DIAGNOSIS — M51.34 DDD (DEGENERATIVE DISC DISEASE), THORACIC: ICD-10-CM

## 2019-08-06 DIAGNOSIS — M96.1 LUMBAR POST-LAMINECTOMY SYNDROME: Primary | ICD-10-CM

## 2019-08-06 DIAGNOSIS — M47.814 SPONDYLOSIS OF THORACIC REGION WITHOUT MYELOPATHY OR RADICULOPATHY: ICD-10-CM

## 2019-08-06 DIAGNOSIS — G89.4 CHRONIC PAIN SYNDROME: ICD-10-CM

## 2019-08-06 DIAGNOSIS — M96.1 CERVICAL POST-LAMINECTOMY SYNDROME: ICD-10-CM

## 2019-08-06 PROCEDURE — G8427 DOCREV CUR MEDS BY ELIG CLIN: HCPCS | Performed by: PHYSICIAN ASSISTANT

## 2019-08-06 PROCEDURE — 4004F PT TOBACCO SCREEN RCVD TLK: CPT | Performed by: PHYSICIAN ASSISTANT

## 2019-08-06 PROCEDURE — 99214 OFFICE O/P EST MOD 30 MIN: CPT | Performed by: PHYSICIAN ASSISTANT

## 2019-08-06 PROCEDURE — 3017F COLORECTAL CA SCREEN DOC REV: CPT | Performed by: PHYSICIAN ASSISTANT

## 2019-08-06 PROCEDURE — G8417 CALC BMI ABV UP PARAM F/U: HCPCS | Performed by: PHYSICIAN ASSISTANT

## 2019-08-06 NOTE — PROGRESS NOTES
has never used smokeless tobacco. He reports that he has current or past drug history. Drug: Marijuana. He reports that he does not drink alcohol. Family History:   Family History   Problem Relation Age of Onset    Cancer Mother     Heart Disease Father     High Blood Pressure Father        REVIEW OF SYSTEMS:   CONSTITUTIONAL: Denies unexplained weight loss, fevers, chills or fatigue  NEUROLOGICAL: Denies unsteady gait or progressive weakness  MUSCULOSKELETAL: Denies joint swelling or redness  GI: Denies nausea, vomiting, diarrhea   : Denies bowel or bladder issues       PHYSICAL EXAM:    Vitals: Blood pressure (!) 142/86, pulse 70, height 5' 7.99\" (1.727 m), weight 263 lb 14.3 oz (119.7 kg). GENERAL EXAM:  · General Apparence: Patient is adequately groomed with no evidence of malnutrition. · Psychiatric: Orientation: The patient is oriented to time, place and person. The patient's mood and affect are appropriate   · Vascular: Examination reveals no swelling and palpation reveals no tenderness in upper or lower extremities. Good capillary refill. · The lymphatic examination of the neck, axillae and groin reveals all areas to be without enlargement or induration  · Sensation is intact without deficit in the upper and lower extremities to light touch and pinprick  · Coordination of the upper and lower extremities are normal.    CERVICAL EXAMINATION:  · Inspection: Local inspection shows no step-off or bruising. Cervical alignment is normal. No instability is noted. · Palpation and Percussion: No evidence of tenderness at the midline. Paraspinal tenderness is present with tenderness over the right scapular region along the previous surgical scarring. There is no paraspinal spasm.    Skin:There are no rashes, ulcerations or lesions  · Range of Motion:  limited by 50% in all planes due to pain   · Strength: 5/5 bilateral upper extremities  · Special Tests:   Spurling's and Sahu's are negative lumbar region. Results for orders placed or performed in visit on 19   Testosterone, free, total   Result Value Ref Range    Testosterone 312 220 - 1,000 ng/dL    Sex Hormone Binding 27 11 - 80 nmol/L    Testosterone, Free 69.0 47 - 244 pg/mL   TSH without Reflex   Result Value Ref Range    TSH 5.98 (H) 0.27 - 4.20 uIU/mL   T4, Free   Result Value Ref Range    T4 Free 0.9 0.9 - 1.8 ng/dL     Impression:       1. Lumbar post-laminectomy syndrome    2. Chronic pain syndrome    3. Cervical post-laminectomy syndrome    4. DDD (degenerative disc disease), thoracic    5. Spondylosis of thoracic region without myelopathy or radiculopathy        Plan:  Clinical Course: Above diagnoses are worsening    I discussed the diagnosis and the treatment options with Jamir Mims today. In Summary:  The various treatment options were outlined and discussed with Jamir Mims including:  Conservative care options: physical therapy, ice, medications, bracing, and activity modification. The indications for therapeutic injections. The indications for additional imaging/laboratory studies. The indications for (possible future) interventions. After considering the various options discussed, Jamir Mims elected to pursue a course of treatment that includes the followin. Medications:  No further recommendations for new medications. 2. PT:  Encouraged to continue with HEP. 3. Further studies:  I will obtain a Thoracic MR without contrast to evaluate for soft tissue pathology or stenosis contributing to the back pain and paresthesias. 4. Interventional:  We discussed pursuing a BILATERAL L3 TF epidural steroid injection to address the pain. Radiologic imaging and symptoms confirm the pain etiology. Risks, benefits and alternatives of interventional options were discussed.   These include and are not limited to bleeding, infection, spinal headache, nerve injury and lack of pain

## 2019-08-07 DIAGNOSIS — Z72.0 TOBACCO USE: ICD-10-CM

## 2019-08-07 DIAGNOSIS — E03.9 HYPOTHYROIDISM, UNSPECIFIED TYPE: ICD-10-CM

## 2019-08-07 RX ORDER — LISINOPRIL 20 MG/1
20 TABLET ORAL DAILY
Qty: 90 TABLET | Refills: 1 | Status: SHIPPED | OUTPATIENT
Start: 2019-08-07 | End: 2019-12-24 | Stop reason: SDUPTHER

## 2019-08-07 RX ORDER — LEVOTHYROXINE SODIUM 0.05 MG/1
50 TABLET ORAL DAILY
Qty: 90 TABLET | Refills: 1 | Status: SHIPPED | OUTPATIENT
Start: 2019-08-07 | End: 2019-10-08 | Stop reason: DRUGHIGH

## 2019-08-07 RX ORDER — VARENICLINE TARTRATE 1 MG/1
1 TABLET, FILM COATED ORAL 2 TIMES DAILY
Qty: 60 TABLET | Refills: 3 | Status: SHIPPED | OUTPATIENT
Start: 2019-08-07 | End: 2019-10-23

## 2019-08-13 ENCOUNTER — HOSPITAL ENCOUNTER (OUTPATIENT)
Age: 54
Setting detail: OUTPATIENT SURGERY
Discharge: HOME | End: 2019-08-13
Attending: PHYSICAL MEDICINE & REHABILITATION | Admitting: PHYSICAL MEDICINE & REHABILITATION
Payer: MEDICARE

## 2019-08-13 VITALS
OXYGEN SATURATION: 98 % | HEART RATE: 62 BPM | DIASTOLIC BLOOD PRESSURE: 66 MMHG | HEIGHT: 68 IN | SYSTOLIC BLOOD PRESSURE: 112 MMHG | TEMPERATURE: 97 F | BODY MASS INDEX: 40.16 KG/M2 | WEIGHT: 265 LBS | RESPIRATION RATE: 16 BRPM

## 2019-08-13 LAB
GLUCOSE BLD-MCNC: 110 MG/DL (ref 70–99)
PERFORMED ON: ABNORMAL

## 2019-08-13 PROCEDURE — 2500000003 HC RX 250 WO HCPCS

## 2019-08-13 PROCEDURE — 7100000011 HC PHASE II RECOVERY - ADDTL 15 MIN: Performed by: PHYSICAL MEDICINE & REHABILITATION

## 2019-08-13 PROCEDURE — 3600000002 HC SURGERY LEVEL 2 BASE: Performed by: PHYSICAL MEDICINE & REHABILITATION

## 2019-08-13 PROCEDURE — 2709999900 HC NON-CHARGEABLE SUPPLY: Performed by: PHYSICAL MEDICINE & REHABILITATION

## 2019-08-13 PROCEDURE — 6360000004 HC RX CONTRAST MEDICATION: Performed by: PHYSICAL MEDICINE & REHABILITATION

## 2019-08-13 PROCEDURE — 2580000003 HC RX 258

## 2019-08-13 PROCEDURE — 2500000003 HC RX 250 WO HCPCS: Performed by: PHYSICAL MEDICINE & REHABILITATION

## 2019-08-13 PROCEDURE — 6360000002 HC RX W HCPCS: Performed by: PHYSICAL MEDICINE & REHABILITATION

## 2019-08-13 PROCEDURE — 7100000010 HC PHASE II RECOVERY - FIRST 15 MIN: Performed by: PHYSICAL MEDICINE & REHABILITATION

## 2019-08-13 RX ORDER — LIDOCAINE HYDROCHLORIDE 10 MG/ML
INJECTION, SOLUTION EPIDURAL; INFILTRATION; INTRACAUDAL; PERINEURAL
Status: COMPLETED
Start: 2019-08-13 | End: 2019-08-13

## 2019-08-13 RX ORDER — LIDOCAINE HYDROCHLORIDE 10 MG/ML
INJECTION, SOLUTION EPIDURAL; INFILTRATION; INTRACAUDAL; PERINEURAL PRN
Status: DISCONTINUED | OUTPATIENT
Start: 2019-08-13 | End: 2019-08-13 | Stop reason: ALTCHOICE

## 2019-08-13 RX ORDER — SODIUM CHLORIDE, SODIUM LACTATE, POTASSIUM CHLORIDE, CALCIUM CHLORIDE 600; 310; 30; 20 MG/100ML; MG/100ML; MG/100ML; MG/100ML
INJECTION, SOLUTION INTRAVENOUS CONTINUOUS
Status: DISCONTINUED | OUTPATIENT
Start: 2019-08-13 | End: 2019-08-13 | Stop reason: HOSPADM

## 2019-08-13 RX ORDER — SODIUM CHLORIDE, SODIUM LACTATE, POTASSIUM CHLORIDE, CALCIUM CHLORIDE 600; 310; 30; 20 MG/100ML; MG/100ML; MG/100ML; MG/100ML
INJECTION, SOLUTION INTRAVENOUS
Status: COMPLETED
Start: 2019-08-13 | End: 2019-08-13

## 2019-08-13 RX ORDER — DEXAMETHASONE SODIUM PHOSPHATE 10 MG/ML
INJECTION, SOLUTION INTRAMUSCULAR; INTRAVENOUS PRN
Status: DISCONTINUED | OUTPATIENT
Start: 2019-08-13 | End: 2019-08-13 | Stop reason: ALTCHOICE

## 2019-08-13 RX ADMIN — SODIUM CHLORIDE, POTASSIUM CHLORIDE, SODIUM LACTATE AND CALCIUM CHLORIDE 1000 ML: 600; 310; 30; 20 INJECTION, SOLUTION INTRAVENOUS at 07:43

## 2019-08-13 RX ADMIN — LIDOCAINE HYDROCHLORIDE 2 ML: 10 INJECTION, SOLUTION EPIDURAL; INFILTRATION; INTRACAUDAL; PERINEURAL at 07:43

## 2019-08-13 ASSESSMENT — PAIN DESCRIPTION - DESCRIPTORS: DESCRIPTORS: STABBING

## 2019-08-13 ASSESSMENT — PAIN - FUNCTIONAL ASSESSMENT
PAIN_FUNCTIONAL_ASSESSMENT: PREVENTS OR INTERFERES SOME ACTIVE ACTIVITIES AND ADLS
PAIN_FUNCTIONAL_ASSESSMENT: 0-10

## 2019-09-05 DIAGNOSIS — E11.42 DIABETIC POLYNEUROPATHY ASSOCIATED WITH TYPE 2 DIABETES MELLITUS (HCC): ICD-10-CM

## 2019-09-07 RX ORDER — PREGABALIN 150 MG/1
CAPSULE ORAL
Qty: 60 CAPSULE | Refills: 0 | Status: SHIPPED | OUTPATIENT
Start: 2019-09-07 | End: 2019-12-24 | Stop reason: SDUPTHER

## 2019-09-10 ENCOUNTER — TELEPHONE (OUTPATIENT)
Dept: FAMILY MEDICINE CLINIC | Age: 54
End: 2019-09-10

## 2019-09-11 ENCOUNTER — TELEPHONE (OUTPATIENT)
Dept: ORTHOPEDIC SURGERY | Age: 54
End: 2019-09-11

## 2019-09-11 NOTE — TELEPHONE ENCOUNTER
Pt states he did some research and states he thinks as long as he does not \"over do it\", he thinks he should be okay. I relayed the message again, that Dr. Kristin Dejesus does not know enough about the products to say safely that he can take them.

## 2019-09-12 ENCOUNTER — TELEPHONE (OUTPATIENT)
Dept: ORTHOPEDIC SURGERY | Age: 54
End: 2019-09-12

## 2019-09-12 NOTE — TELEPHONE ENCOUNTER
Called and spoke to patient in regards to message about increased low back pain. I did offer to schedule him [place] [facility] [place], but he notes not wishing to travel that far. He state he will just go to the ER if his symptoms continue to worsen. I did inform the patient if he changed his mind about being seen, to please call the office and informed him the PA has an opening [place] [facility] [place] 1:45pm.  He noted understanding.

## 2019-09-12 NOTE — TELEPHONE ENCOUNTER
PATIENT HAD TO LEAVE EARLY TODAY FROM WORK AND IS IN SO MUCH PAIN THAT IT TAKE HIS BREATH AWAY. NEXT APPT. 09/17/2019 CANT WAIT THAT LONG. FEELS LIKE AN ICE PICK GOING IN THE BACK. WOULD LIKE A CALL BACK FROM [facility] OR YOU ON WHAT TO DO. GO TO ER OR WHAT?

## 2019-09-17 ENCOUNTER — TELEPHONE (OUTPATIENT)
Dept: ORTHOPEDIC SURGERY | Age: 54
End: 2019-09-17

## 2019-09-17 ENCOUNTER — OFFICE VISIT (OUTPATIENT)
Dept: ORTHOPEDIC SURGERY | Age: 54
End: 2019-09-17
Payer: MEDICARE

## 2019-09-17 VITALS
DIASTOLIC BLOOD PRESSURE: 57 MMHG | SYSTOLIC BLOOD PRESSURE: 109 MMHG | HEIGHT: 68 IN | HEART RATE: 74 BPM | WEIGHT: 264.99 LBS | BODY MASS INDEX: 40.16 KG/M2

## 2019-09-17 DIAGNOSIS — M47.816 SPONDYLOSIS OF LUMBAR REGION WITHOUT MYELOPATHY OR RADICULOPATHY: ICD-10-CM

## 2019-09-17 DIAGNOSIS — G89.4 CHRONIC PAIN SYNDROME: ICD-10-CM

## 2019-09-17 DIAGNOSIS — M54.16 LUMBAR RADICULOPATHY: ICD-10-CM

## 2019-09-17 DIAGNOSIS — M96.1 LUMBAR POST-LAMINECTOMY SYNDROME: Primary | ICD-10-CM

## 2019-09-17 PROCEDURE — 4004F PT TOBACCO SCREEN RCVD TLK: CPT | Performed by: PHYSICAL MEDICINE & REHABILITATION

## 2019-09-17 PROCEDURE — 99214 OFFICE O/P EST MOD 30 MIN: CPT | Performed by: PHYSICAL MEDICINE & REHABILITATION

## 2019-09-17 PROCEDURE — G8427 DOCREV CUR MEDS BY ELIG CLIN: HCPCS | Performed by: PHYSICAL MEDICINE & REHABILITATION

## 2019-09-17 PROCEDURE — 3017F COLORECTAL CA SCREEN DOC REV: CPT | Performed by: PHYSICAL MEDICINE & REHABILITATION

## 2019-09-17 PROCEDURE — G8417 CALC BMI ABV UP PARAM F/U: HCPCS | Performed by: PHYSICAL MEDICINE & REHABILITATION

## 2019-09-17 RX ORDER — BACLOFEN 10 MG/1
10 TABLET ORAL DAILY
Qty: 30 TABLET | Refills: 0 | Status: SHIPPED | OUTPATIENT
Start: 2019-09-17 | End: 2019-10-23 | Stop reason: ALTCHOICE

## 2019-09-17 NOTE — PROGRESS NOTES
are normal. No atrophy or abnormal movements are noted. · LEFT LOWER EXTREMITY:  Inspection/examination of the left lower extremity does not show any tenderness, deformity or injury. Range of motion is normal and pain-free. There is no gross instability. There are no rashes, ulcerations or lesions. Strength and tone are normal. No atrophy or abnormal movements are noted. Patient Psychological Assessment Results  Neuromodulation Assessment      Clinic Provider Name Patient Name Date  [address] Dominique 09-  This patient has also undergone a battery of psychological tests and the results are reported below:    ISAIAS / 33% Mild anxiety  PHQ / 29% Minimal symptoms  PSEQ / 26 Low on self-efficacy beliefs  Oswestry / 42% Severe Disability  Spinal Cord Stimulation or Dorsal Column Stimulation (SCS or DCS) and Peripheral Nerve Stimulation (PNS) is an advanced medical technology that can be used for the treatment of recalcitrant pain in patients that have failed other more conservative treatment modalities. This patient has undergone an extensive trial of more conservative modalities. They have tried and failed \"step therapy\" as recommended by the insurance carrier. The patient has not been provided with acceptable relief from trials of physical therapy and simple analgesic medications for over 3 months. Prior to being allowed to have a trial of the stimulation technology, the patient is required by the insurance carrier to undergo \"psychological clearance. \" Such psychological clearance should be:    Designed to provide education about the device and set proper expectations  Try and determine that the patient has adequate insight about implantable therapies in general  Try and determine if the patient has such severe depression and/or anxiety that they would likely not get relief no matter what the clinical effect of the treatment was during the trial or after the implant. CMS requirements for coverage of central and peripheral nervous system stimulators deem that \"Patients have undergone screening, evaluation, and diagnosis by a multidisciplinary team prior to implantation and such screening must include psychological as well as physical evaluation\"    The patient has been educated about the use of spinal cord stimulation and/or peripheral nerve stimulation for pain and the risks and benefits of the procedure. More specifically they have affirmed the following statement:    \"I understand that spinal cord stimulation and/or peripheral nerve stimulation technology may or may not help my pain. I understand that I will undergo a trial procedure that will help to determine if the therapy would be of benefit to me. I understand that this is a trial to see if I get pain relief. If I get pain relief, then I may proceed with a permanent implant. If I do not get significant pain relief, then I likely should not proceed with an implant procedure. \"    Link to print [facility] Cognitive Assessment): ([facility].. [place].gov/resources/[place]-Test-English.pdf)    Based on the psychological screening, physical evaluation and medical consultation performed on this patient, the patient is professionally deemed by myself to be mentally competent and capable of having a spinal cord stimulator, peripheral nerve stimulator, and/or deep brain stimulator trialed and/or implanted. My hope is that through this medical plan of care, the patient will obtain better pain relief, a higher degree of function, and possibly be able to decrease opioid pain medications with the use of this technology. ISAIAS  1: How often do you feel nervous, anxious or on edge?  1 or 2 days a week  2: How often are you not able to stop worrying?  1 or 2 days a week  3: How often do you find yourself worrying too much about many different things?    1 or 2 days a week  4: How often do you have trouble appointment sooner if he has any additional concerns or questions. Penelope Schreiber ATC, am scribing for and in the presence of Dr. Nicki Mazariegos.   09/17/19 4:37 PM Adrienne Palacios ATC    The physical examination was performed between the patient and Dr. Nicki Mazariegos. All counseling during the appointment was performed between the patient and provider. I, Dr. Priyanka Sanchez, personally performed the services described in this documentation as scribed by МАРИНА Jacob in my presence and it is both accurate and complete. Erickson Pereira. Jacky Santos MD, TIFFANY, [facility]  Board Certified in [address] [place] [address] Certified and Fellowship Trained in [facility] ([facility])             This dictation was performed with a verbal recognition program [facility]) and it was checked for errors. It is possible that there are still dictated errors within this office note. If so, please bring any errors to my attention for an addendum. All efforts were made to ensure that this office note is accurate.

## 2019-09-18 ENCOUNTER — TELEPHONE (OUTPATIENT)
Dept: ORTHOPEDIC SURGERY | Age: 54
End: 2019-09-18

## 2019-09-18 NOTE — TELEPHONE ENCOUNTER
PRIOR AUTHORIZATION form and medical records sent to [place] for SCS TRIAL APPROVAL with a possible date of 10/14/2019. Once approval is received, patient will be scheduled for final consultation, scs trial and lead pull. Patient is aware.  Waiting for approval.

## 2019-09-19 NOTE — PROGRESS NOTES
PATIENT REACHED   YES__X__NO____    PREOP INSTUCTIONS LEFT ON VM NUMBER_______________      DATE__9/20/19_______ TIME__0830_______ARRIVAL___0730_____PLACE__[facility]__________  NOTHING TO EAT OR DRINK  6 HOURS PRIOR TO PROCEDURE START TIME  YOU NEED A RESPONSIBLE ADULT AGE 18 OR OLDER TO DRIVE YOU HOME  PLEASE BRING INSURANCE CARD. PICTURE ID AND COMPLETE LIST OF MEDS  WEAR LOOSE COMFORTABLE CLOTHING  FOLLOW ANY INSTRUCTIONS YOUR DRS OFFICE HAS GIVEN YOU,INCLUDING WHAT MEDICATIONS TO TAKE THE AM OF PROCEDURE AND WHEN AND IF YOU NEED TO STOP ANY BLOOD THINNERS. IF YOU HAVE QUESTIONS REGARDING THIS CALL THE OFFICE  THE GOAL BLOOD SUGAR THE AM OF PROCEDURE  OR LESS ABOVE THAT THE PROCEDURE MAY BE CANCELLED  ANY QUESTIONS CALL YOUR DOCTOR. ALSO,PLEASE READ THE INSTRUCTION PACKET FROM YOUR DR IF YOU RECEIVED ONE.   SPINE INTERVENTION NUMBER [id number]

## 2019-09-20 ENCOUNTER — APPOINTMENT (OUTPATIENT)
Dept: GENERAL RADIOLOGY | Age: 54
End: 2019-09-20
Attending: PHYSICAL MEDICINE & REHABILITATION
Payer: MEDICARE

## 2019-09-20 ENCOUNTER — HOSPITAL ENCOUNTER (OUTPATIENT)
Age: 54
Setting detail: OUTPATIENT SURGERY
Discharge: HOME | End: 2019-09-20
Attending: PHYSICAL MEDICINE & REHABILITATION | Admitting: PHYSICAL MEDICINE & REHABILITATION
Payer: MEDICARE

## 2019-09-20 VITALS
BODY MASS INDEX: 37.89 KG/M2 | HEIGHT: 68 IN | OXYGEN SATURATION: 96 % | DIASTOLIC BLOOD PRESSURE: 75 MMHG | WEIGHT: 250 LBS | HEART RATE: 64 BPM | RESPIRATION RATE: 16 BRPM | TEMPERATURE: 97.7 F | SYSTOLIC BLOOD PRESSURE: 126 MMHG

## 2019-09-20 LAB
GLUCOSE BLD-MCNC: 95 MG/DL (ref 70–99)
PERFORMED ON: NORMAL

## 2019-09-20 PROCEDURE — 2500000003 HC RX 250 WO HCPCS: Performed by: PHYSICAL MEDICINE & REHABILITATION

## 2019-09-20 PROCEDURE — 2580000003 HC RX 258: Performed by: PHYSICAL MEDICINE & REHABILITATION

## 2019-09-20 PROCEDURE — 6360000002 HC RX W HCPCS: Performed by: PHYSICAL MEDICINE & REHABILITATION

## 2019-09-20 PROCEDURE — 99152 MOD SED SAME PHYS/QHP 5/>YRS: CPT | Performed by: PHYSICAL MEDICINE & REHABILITATION

## 2019-09-20 PROCEDURE — 3610000056 HC PAIN LEVEL 4 BASE (NON-OR): Performed by: PHYSICAL MEDICINE & REHABILITATION

## 2019-09-20 PROCEDURE — 2709999900 HC NON-CHARGEABLE SUPPLY: Performed by: PHYSICAL MEDICINE & REHABILITATION

## 2019-09-20 PROCEDURE — 3209999900 FLUORO FOR SURGICAL PROCEDURES

## 2019-09-20 RX ORDER — BETAMETHASONE SODIUM PHOSPHATE AND BETAMETHASONE ACETATE 3; 3 MG/ML; MG/ML
INJECTION, SUSPENSION INTRA-ARTICULAR; INTRALESIONAL; INTRAMUSCULAR; SOFT TISSUE
Status: COMPLETED | OUTPATIENT
Start: 2019-09-20 | End: 2019-09-20

## 2019-09-20 RX ORDER — LIDOCAINE HYDROCHLORIDE 10 MG/ML
INJECTION, SOLUTION INFILTRATION; PERINEURAL
Status: COMPLETED | OUTPATIENT
Start: 2019-09-20 | End: 2019-09-20

## 2019-09-20 RX ORDER — MIDAZOLAM HYDROCHLORIDE 1 MG/ML
INJECTION INTRAMUSCULAR; INTRAVENOUS
Status: COMPLETED | OUTPATIENT
Start: 2019-09-20 | End: 2019-09-20

## 2019-09-20 RX ORDER — 0.9 % SODIUM CHLORIDE 0.9 %
VIAL (ML) INJECTION
Status: COMPLETED | OUTPATIENT
Start: 2019-09-20 | End: 2019-09-20

## 2019-09-20 RX ORDER — FENTANYL CITRATE 50 UG/ML
INJECTION, SOLUTION INTRAMUSCULAR; INTRAVENOUS
Status: COMPLETED | OUTPATIENT
Start: 2019-09-20 | End: 2019-09-20

## 2019-09-20 ASSESSMENT — PAIN - FUNCTIONAL ASSESSMENT: PAIN_FUNCTIONAL_ASSESSMENT: 0-10

## 2019-09-20 ASSESSMENT — PAIN SCALES - GENERAL
PAINLEVEL_OUTOF10: 0
PAINLEVEL_OUTOF10: 2

## 2019-09-20 NOTE — OP NOTE
Patient:  Pam Tran  YOB: 1965  Medical Record #:  [id number]   Place: [address] [place] [address]  Date:  9/20/2019   Physician:  Thom Aaron MD, TIFFANY    Procedure: 1. Transforaminal Lumbar Epidural Steroid Injection -  right L3  CPT 38414          2. Transforaminal Lumbar Epidural Steroid Injection -  left L3  CPT Mod 50    Pre-Procedure Diagnosis: Lumbar radiculopathy      Post-Procedure Diagnosis: Same    Sedation: Local with 1% Lidocaine 3 ml and 2 mg of IV Versed and 50 mcg of IV Fentanyl    EBL: None    Complications: None    Procedure Summary:        The patient was brought to the procedure suite and placed in the prone position. The skin overlying the lumbar spine was prepped and draped in the usual sterile fashion. Using fluoroscopic guidance, the right L3 foramen was identified. Through anesthetized skin, a 22 gauge 3.5 inch curved tip spinal needle was advanced into the foramen. Isovue M 300 was instilled showing an epidurogram/nerve root outline pattern without evidence of vascular or intrathecal spread. Following which, 7.5 mg of Celestone mixed with 1 ml of 0.5% Marcaine was instilled. The needle was removed. Using fluoroscopic guidance, the left L3 foramen was identified. Through anesthetized skin, a 22 gauge 5 inch curved tip spinal needle was advanced into the foramen. Isovue M 300 was instilled showing an epidurogram/nerve root outline pattern without evidence of vascular or intrathecal spread. Following which,  7.5 mg of Celestone mixed with 1 ml of 0.5% Marcaine was instilled. The needle was removed and band-aids were applied. The patient was transferred to the post-operative area in stable condition.

## 2019-09-20 NOTE — H&P
Yari Aponte, DO   lisinopril (PRINIVIL;ZESTRIL) 20 MG tablet Take 1 tablet by mouth daily 8/7/19  Yes Yari Aponte, DO   medical marijuana Take by mouth as needed. Yes Historical Provider, MD   baclofen (LIORESAL) 10 MG tablet Take 1 tablet by mouth daily 9/17/19   Sudarshan Morales MD   Blood Pressure KIT 1 kit by Does not apply route 2 times daily 5/13/19   Jamel Ibarra DO     Allergies:  Morphine sulfate  Social History:    reports that he has been smoking cigarettes. He has a 30.00 pack-year smoking history. He has never used smokeless tobacco. He reports that he has current or past drug history. Drug: Marijuana. He reports that he does not drink alcohol. Family History:   Family History   Problem Relation Age of Onset    Cancer Mother     Heart Disease Father     High Blood Pressure Father        Vitals: Blood pressure 125/75, pulse 64, temperature 97.7 °F (36.5 °C), temperature source Temporal, resp. rate 16, height 5' 8\" (1.727 m), weight 250 lb (113.4 kg), SpO2 100 %. PHYSICAL EXAM:including affected areas  HENT: Airway patent and reviewed  Cardiovascular: Normal rate, regular rhythm, normal heart sounds. Pulmonary/Chest: No wheezes. No rhonchi. No rales. Abdominal: Soft. Bowel sounds are normal. No distension. Extremities: Moves all extremities equally  Cervical and Lumbar Spine: Painful range of motion, no midline tenderness       Diagnosis:Lumbar radiculopathy  M96.1   G89.4   M54.16   M47.816    Plan: Proceed with planned procedure      ASA CLASS:         []   I. Normal, healthy adult           [x]   II.  Mild systemic disease            []   III. Severe systemic disease      Mallampati: Mallampati Class II - (soft palate, fauces & uvula are visible)      Sedation plan:   [x]  Local              []  Minimal                  []  General anesthesia    Patient's condition acceptable for planned procedure/sedation.    Post Procedure Plan   Return to same level of

## 2019-09-26 NOTE — PROGRESS NOTES
input(s): LABMICR in the last 72 hours. Lab Results   Component Value Date     05/28/2019    K 4.8 05/28/2019     05/28/2019    CO2 24 05/28/2019    BUN 11 05/28/2019    CREATININE 1.0 05/28/2019    GLUCOSE 94 05/28/2019    CALCIUM 9.7 05/28/2019    PROT 8.2 05/28/2019    LABALBU 4.3 05/28/2019    BILITOT 0.3 05/28/2019    ALKPHOS 51 05/28/2019    AST 16 05/28/2019    ALT 16 05/28/2019    LABGLOM >60 05/28/2019    GFRAA >60 05/28/2019    AGRATIO 1.1 05/28/2019    GLOB 3.9 05/28/2019          Assessment:      Diabetes mellitus Type II, under excellent control. Diagnosis Orders   1. Type 2 diabetes mellitus with other specified complication, unspecified whether long term insulin use (HCC)  POCT glycosylated hemoglobin (Hb A1C)    POCT microalbumin    HM DIABETES FOOT EXAM    TSH without Reflex    Lipid Panel    Comprehensive Metabolic Panel        Plan:      Discussed foot care.

## 2019-09-27 ENCOUNTER — OFFICE VISIT (OUTPATIENT)
Dept: FAMILY MEDICINE CLINIC | Age: 54
End: 2019-09-27
Payer: MEDICARE

## 2019-09-27 VITALS
BODY MASS INDEX: 38.32 KG/M2 | HEART RATE: 73 BPM | WEIGHT: 252 LBS | SYSTOLIC BLOOD PRESSURE: 124 MMHG | TEMPERATURE: 98.2 F | DIASTOLIC BLOOD PRESSURE: 84 MMHG | RESPIRATION RATE: 16 BRPM | OXYGEN SATURATION: 99 %

## 2019-09-27 DIAGNOSIS — E03.9 HYPOTHYROIDISM, UNSPECIFIED TYPE: ICD-10-CM

## 2019-09-27 DIAGNOSIS — Z23 NEED FOR INFLUENZA VACCINATION: ICD-10-CM

## 2019-09-27 DIAGNOSIS — E11.42 CONTROLLED TYPE 2 DIABETES MELLITUS WITH DIABETIC POLYNEUROPATHY, WITHOUT LONG-TERM CURRENT USE OF INSULIN (HCC): Primary | ICD-10-CM

## 2019-09-27 DIAGNOSIS — E11.42 CONTROLLED TYPE 2 DIABETES MELLITUS WITH DIABETIC POLYNEUROPATHY, WITHOUT LONG-TERM CURRENT USE OF INSULIN (HCC): ICD-10-CM

## 2019-09-27 LAB
A/G RATIO: 1.4 (ref 1.1–2.2)
ALBUMIN SERPL-MCNC: 4.6 G/DL (ref 3.4–5)
ALP BLD-CCNC: 48 U/L (ref 40–129)
ALT SERPL-CCNC: 17 U/L (ref 10–40)
ANION GAP SERPL CALCULATED.3IONS-SCNC: 13 MMOL/L (ref 3–16)
AST SERPL-CCNC: 18 U/L (ref 15–37)
BILIRUB SERPL-MCNC: 0.5 MG/DL (ref 0–1)
BUN BLDV-MCNC: 17 MG/DL (ref 7–20)
CALCIUM SERPL-MCNC: 9.9 MG/DL (ref 8.3–10.6)
CHLORIDE BLD-SCNC: 95 MMOL/L (ref 99–110)
CHOLESTEROL, TOTAL: 209 MG/DL (ref 0–199)
CO2: 25 MMOL/L (ref 21–32)
CREAT SERPL-MCNC: 1.1 MG/DL (ref 0.9–1.3)
CREATININE URINE POCT: 200
GFR AFRICAN AMERICAN: >60
GFR NON-AFRICAN AMERICAN: >60
GLOBULIN: 3.2 G/DL
GLUCOSE BLD-MCNC: 102 MG/DL (ref 70–99)
HBA1C MFR BLD: 5.7 %
HDLC SERPL-MCNC: 47 MG/DL (ref 40–60)
LDL CHOLESTEROL CALCULATED: 147 MG/DL
MICROALBUMIN/CREAT 24H UR: 10 MG/G{CREAT}
MICROALBUMIN/CREAT UR-RTO: >30
POTASSIUM SERPL-SCNC: 4.8 MMOL/L (ref 3.5–5.1)
SODIUM BLD-SCNC: 133 MMOL/L (ref 136–145)
T4 FREE: 1 NG/DL (ref 0.9–1.8)
TOTAL PROTEIN: 7.8 G/DL (ref 6.4–8.2)
TRIGL SERPL-MCNC: 75 MG/DL (ref 0–150)
TSH SERPL DL<=0.05 MIU/L-ACNC: 6.03 UIU/ML (ref 0.27–4.2)
VLDLC SERPL CALC-MCNC: 15 MG/DL

## 2019-09-27 PROCEDURE — G0008 ADMIN INFLUENZA VIRUS VAC: HCPCS | Performed by: FAMILY MEDICINE

## 2019-09-27 PROCEDURE — G8427 DOCREV CUR MEDS BY ELIG CLIN: HCPCS | Performed by: FAMILY MEDICINE

## 2019-09-27 PROCEDURE — 90686 IIV4 VACC NO PRSV 0.5 ML IM: CPT | Performed by: FAMILY MEDICINE

## 2019-09-27 PROCEDURE — 2022F DILAT RTA XM EVC RTNOPTHY: CPT | Performed by: FAMILY MEDICINE

## 2019-09-27 PROCEDURE — 3017F COLORECTAL CA SCREEN DOC REV: CPT | Performed by: FAMILY MEDICINE

## 2019-09-27 PROCEDURE — 82044 UR ALBUMIN SEMIQUANTITATIVE: CPT | Performed by: FAMILY MEDICINE

## 2019-09-27 PROCEDURE — G8417 CALC BMI ABV UP PARAM F/U: HCPCS | Performed by: FAMILY MEDICINE

## 2019-09-27 PROCEDURE — 83036 HEMOGLOBIN GLYCOSYLATED A1C: CPT | Performed by: FAMILY MEDICINE

## 2019-09-27 PROCEDURE — 3044F HG A1C LEVEL LT 7.0%: CPT | Performed by: FAMILY MEDICINE

## 2019-09-27 PROCEDURE — 99214 OFFICE O/P EST MOD 30 MIN: CPT | Performed by: FAMILY MEDICINE

## 2019-09-27 PROCEDURE — 4004F PT TOBACCO SCREEN RCVD TLK: CPT | Performed by: FAMILY MEDICINE

## 2019-09-27 RX ORDER — SILDENAFIL 100 MG/1
100 TABLET, FILM COATED ORAL PRN
Qty: 30 TABLET | Refills: 3 | Status: SHIPPED | OUTPATIENT
Start: 2019-09-27

## 2019-09-27 ASSESSMENT — ENCOUNTER SYMPTOMS
BACK PAIN: 1
SHORTNESS OF BREATH: 1

## 2019-09-27 ASSESSMENT — PATIENT HEALTH QUESTIONNAIRE - PHQ9
SUM OF ALL RESPONSES TO PHQ QUESTIONS 1-9: 0
2. FEELING DOWN, DEPRESSED OR HOPELESS: 0
1. LITTLE INTEREST OR PLEASURE IN DOING THINGS: 0
SUM OF ALL RESPONSES TO PHQ9 QUESTIONS 1 & 2: 0
SUM OF ALL RESPONSES TO PHQ QUESTIONS 1-9: 0

## 2019-10-01 ENCOUNTER — OFFICE VISIT (OUTPATIENT)
Dept: ORTHOPEDIC SURGERY | Age: 54
End: 2019-10-01
Payer: MEDICARE

## 2019-10-01 VITALS
SYSTOLIC BLOOD PRESSURE: 112 MMHG | HEART RATE: 72 BPM | BODY MASS INDEX: 38.1 KG/M2 | HEIGHT: 68 IN | DIASTOLIC BLOOD PRESSURE: 61 MMHG | WEIGHT: 251.39 LBS

## 2019-10-01 DIAGNOSIS — M48.04 THORACIC STENOSIS: ICD-10-CM

## 2019-10-01 DIAGNOSIS — M54.16 LUMBAR RADICULOPATHY: ICD-10-CM

## 2019-10-01 DIAGNOSIS — M96.1 LUMBAR POST-LAMINECTOMY SYNDROME: Primary | ICD-10-CM

## 2019-10-01 DIAGNOSIS — M54.9 MID BACK PAIN: ICD-10-CM

## 2019-10-01 DIAGNOSIS — M96.1 CERVICAL POST-LAMINECTOMY SYNDROME: ICD-10-CM

## 2019-10-01 DIAGNOSIS — M96.1 POSTLAMINECTOMY SYNDROME, THORACIC: ICD-10-CM

## 2019-10-01 DIAGNOSIS — G89.4 CHRONIC PAIN SYNDROME: ICD-10-CM

## 2019-10-01 PROCEDURE — G8417 CALC BMI ABV UP PARAM F/U: HCPCS | Performed by: PHYSICAL MEDICINE & REHABILITATION

## 2019-10-01 PROCEDURE — G8427 DOCREV CUR MEDS BY ELIG CLIN: HCPCS | Performed by: PHYSICAL MEDICINE & REHABILITATION

## 2019-10-01 PROCEDURE — 3017F COLORECTAL CA SCREEN DOC REV: CPT | Performed by: PHYSICAL MEDICINE & REHABILITATION

## 2019-10-01 PROCEDURE — 4004F PT TOBACCO SCREEN RCVD TLK: CPT | Performed by: PHYSICAL MEDICINE & REHABILITATION

## 2019-10-01 PROCEDURE — 99214 OFFICE O/P EST MOD 30 MIN: CPT | Performed by: PHYSICAL MEDICINE & REHABILITATION

## 2019-10-01 PROCEDURE — G8482 FLU IMMUNIZE ORDER/ADMIN: HCPCS | Performed by: PHYSICAL MEDICINE & REHABILITATION

## 2019-10-02 ENCOUNTER — TELEPHONE (OUTPATIENT)
Dept: ORTHOPEDIC SURGERY | Age: 54
End: 2019-10-02

## 2019-10-02 RX ORDER — METHYLPREDNISOLONE 4 MG/1
TABLET ORAL
Qty: 1 KIT | Refills: 0 | Status: SHIPPED | OUTPATIENT
Start: 2019-10-02 | End: 2019-10-23 | Stop reason: ALTCHOICE

## 2019-10-07 ENCOUNTER — TELEPHONE (OUTPATIENT)
Dept: FAMILY MEDICINE CLINIC | Age: 54
End: 2019-10-07

## 2019-10-08 DIAGNOSIS — E03.9 HYPOTHYROIDISM, UNSPECIFIED TYPE: Primary | ICD-10-CM

## 2019-10-08 DIAGNOSIS — E03.9 HYPOTHYROIDISM, UNSPECIFIED TYPE: ICD-10-CM

## 2019-10-08 RX ORDER — LEVOTHYROXINE SODIUM 0.07 MG/1
75 TABLET ORAL DAILY
Qty: 90 TABLET | Refills: 0 | Status: SHIPPED | OUTPATIENT
Start: 2019-10-08 | End: 2019-12-24 | Stop reason: SDUPTHER

## 2019-10-08 RX ORDER — LEVOTHYROXINE SODIUM 0.07 MG/1
75 TABLET ORAL DAILY
Qty: 30 TABLET | Refills: 0 | Status: SHIPPED | OUTPATIENT
Start: 2019-10-08 | End: 2019-10-08 | Stop reason: SDUPTHER

## 2019-10-18 ENCOUNTER — TELEPHONE (OUTPATIENT)
Dept: ORTHOPEDIC SURGERY | Age: 54
End: 2019-10-18

## 2019-10-21 ENCOUNTER — HOSPITAL ENCOUNTER (OUTPATIENT)
Dept: CT IMAGING | Age: 54
Discharge: HOME | End: 2019-10-21
Payer: MEDICARE

## 2019-10-21 DIAGNOSIS — M48.04 THORACIC STENOSIS: ICD-10-CM

## 2019-10-21 PROCEDURE — 72128 CT CHEST SPINE W/O DYE: CPT

## 2019-10-23 ENCOUNTER — TELEPHONE (OUTPATIENT)
Dept: ORTHOPEDIC SURGERY | Age: 54
End: 2019-10-23

## 2019-10-23 RX ORDER — CEPHALEXIN 500 MG/1
500 CAPSULE ORAL 3 TIMES DAILY
Qty: 15 CAPSULE | Refills: 0 | Status: SHIPPED | OUTPATIENT
Start: 2019-10-28 | End: 2019-11-02

## 2019-10-25 ENCOUNTER — TELEPHONE (OUTPATIENT)
Dept: ORTHOPEDIC SURGERY | Age: 54
End: 2019-10-25

## 2019-10-25 ENCOUNTER — TELEPHONE (OUTPATIENT)
Dept: FAMILY MEDICINE CLINIC | Age: 54
End: 2019-10-25

## 2019-10-25 ENCOUNTER — E-VISIT (OUTPATIENT)
Dept: FAMILY MEDICINE CLINIC | Age: 54
End: 2019-10-25

## 2019-10-25 PROCEDURE — 99444 PR PHYSICIAN ONLINE EVALUATION & MANAGEMENT SERVICE: CPT | Performed by: FAMILY MEDICINE

## 2019-10-25 RX ORDER — FLUTICASONE PROPIONATE 50 MCG
SPRAY, SUSPENSION (ML) NASAL
Qty: 1 BOTTLE | Refills: 0 | Status: SHIPPED | OUTPATIENT
Start: 2019-10-25 | End: 2020-01-17

## 2019-10-25 RX ORDER — FLUTICASONE PROPIONATE 50 MCG
1 SPRAY, SUSPENSION (ML) NASAL DAILY
Qty: 1 BOTTLE | Refills: 0 | Status: SHIPPED | OUTPATIENT
Start: 2019-10-25 | End: 2019-10-25 | Stop reason: SDUPTHER

## 2019-10-25 RX ORDER — BENZONATATE 100 MG/1
100 CAPSULE ORAL 3 TIMES DAILY PRN
Qty: 30 CAPSULE | Refills: 1 | Status: SHIPPED | OUTPATIENT
Start: 2019-10-25 | End: 2019-11-04

## 2019-10-25 RX ORDER — AZITHROMYCIN 250 MG/1
250 TABLET, FILM COATED ORAL DAILY
Qty: 1 PACKET | Refills: 0 | Status: SHIPPED | OUTPATIENT
Start: 2019-10-25 | End: 2020-01-17 | Stop reason: ALTCHOICE

## 2019-10-25 ASSESSMENT — LIFESTYLE VARIABLES
SMOKING_YEARS: 30
SMOKING_STATUS: YES

## 2019-12-24 ENCOUNTER — TELEPHONE (OUTPATIENT)
Dept: FAMILY MEDICINE CLINIC | Age: 54
End: 2019-12-24

## 2019-12-24 DIAGNOSIS — E11.42 DIABETIC POLYNEUROPATHY ASSOCIATED WITH TYPE 2 DIABETES MELLITUS (HCC): ICD-10-CM

## 2019-12-24 DIAGNOSIS — E03.9 HYPOTHYROIDISM, UNSPECIFIED TYPE: ICD-10-CM

## 2019-12-24 RX ORDER — PREGABALIN 150 MG/1
CAPSULE ORAL
Qty: 60 CAPSULE | Refills: 0 | Status: SHIPPED | OUTPATIENT
Start: 2019-12-24 | End: 2020-01-28

## 2019-12-24 RX ORDER — LEVOTHYROXINE SODIUM 0.07 MG/1
75 TABLET ORAL DAILY
Qty: 90 TABLET | Refills: 0 | Status: SHIPPED | OUTPATIENT
Start: 2019-12-24 | End: 2020-03-06 | Stop reason: SDUPTHER

## 2019-12-24 RX ORDER — LISINOPRIL 20 MG/1
20 TABLET ORAL DAILY
Qty: 90 TABLET | Refills: 1 | Status: SHIPPED | OUTPATIENT
Start: 2019-12-24 | End: 2020-03-06 | Stop reason: SDUPTHER

## 2020-01-15 NOTE — PROGRESS NOTES
Joana Srivastava. is a 53 y.o. male who presentsfor follow up of diabetes. .   Current symptoms include: left wrist pain for 5-6 weeks and erectile dysfunction  Patient denies hyperglycemia and hypoglycemia . Evaluation to date has been: fasting blood sugar, fasting lipid panel, hemoglobin A1C and microalbuminuria. Home sugars: patient does not check sugars. Current treatments: none. Last dilated eye exam summer 2019 - negative for DR. Last foot exam 09/27/19. Last urine microalbumin 09/27/19. Today's A1C = 5.8  09/27/19 A1C = 5.7  05/10/19 A1C = 5.8      Past Medical History:   Diagnosis Date    CAD (coronary artery disease) 8/25/10    Cerebral artery occlusion with cerebral infarction [facility]) 2011    L hand less fine motor movements    Chronic back pain     Controlled type 2 diabetes mellitus with diabetic polyneuropathy, without long-term current use of insulin ([facility])     Controlled type 2 diabetes mellitus with diabetic polyneuropathy, without long-term current use of insulin ([facility])     Depression 8/25/10    Displacement of cervical intervertebral disc without myelopathy     Gastroesophageal reflux 8/25/10    Hypercholesteremia     Hyperlipidemia 8/25/10    Hypertension     benign essential    Hypogonadism     Hypothyroidism     acquired    Osteoarthritis     Osteoarthritis of spine 8/25/10       Current Outpatient Medications   Medication Sig Dispense Refill    varenicline (CHANTIX) 1 MG tablet Take 1 mg by mouth 2 times daily      levothyroxine (SYNTHROID) 75 MCG tablet Take 1 tablet by mouth daily 90 tablet 0    pregabalin (LYRICA) 150 MG capsule TAKE ONE CAPSULE BY MOUTH TWICE DAILY 60 capsule 0    lisinopril (PRINIVIL;ZESTRIL) 20 MG tablet Take 1 tablet by mouth daily 90 tablet 1    sildenafil (VIAGRA) 100 MG tablet Take 1 tablet by mouth as needed for Erectile Dysfunction 30 tablet 3    medical marijuana Take by mouth as needed.        No current facility-administered medications for this visit. Allergies   Allergen Reactions    Mobic [Meloxicam]      Upset stomach    Morphine Sulfate Itching     Pt is only allergic to Morphine ER, not IVP Morphine. Social History     Tobacco Use    Smoking status: Current Every Day Smoker     Packs/day: 1.00     Years: 30.00     Pack years: 30.00     Types: Cigarettes    Smokeless tobacco: Never Used    Tobacco comment: Is on Chantix   Substance Use Topics    Alcohol use: No    Drug use: Yes     Types: Marijuana     Comment: Medical marijuana card        Vitals:    01/17/20 0848   BP: 120/70   Pulse: 71   Resp: 14   SpO2: 95%         Review of Systems    Review of Systems   Constitutional: Negative for fatigue. Eyes: Negative for visual disturbance. Gastrointestinal: Positive for abdominal pain (periodic) and diarrhea (periodic). Endocrine: Negative for polydipsia and polyuria. Genitourinary:        Difficulty maintaining an erection   Musculoskeletal: Positive for arthralgias (left wrist 5-6 weeks). Objective:      Physical Exam  Vitals signs reviewed. Constitutional:       Appearance: He is well-developed. HENT:      Head: Normocephalic and atraumatic. Eyes:      Pupils: Pupils are equal, round, and reactive to light. Neck:      Musculoskeletal: Normal range of motion. Cardiovascular:      Rate and Rhythm: Normal rate and regular rhythm. Heart sounds: Normal heart sounds. Pulmonary:      Effort: Pulmonary effort is normal.      Breath sounds: Normal breath sounds. No wheezing. Abdominal:      General: Bowel sounds are normal.      Tenderness: There is no tenderness. Musculoskeletal:         General: Tenderness (left wrist) and deformity (mild left wrist/distal forearm) present. Neurological:      Mental Status: He is alert and oriented to person, place, and time. Psychiatric:         Behavior: Behavior normal.         Thought Content:  Thought content normal.         Judgment: Judgment normal.         Monofilament Foot &Sensory exam:  Done 09/27/19. .    Laboratory:  Recent Labs     01/17/20  0924   LABA1C 5.8     No results for input(s): LABMICR in the last 72 hours. Lab Results   Component Value Date     (L) 09/27/2019    K 4.8 09/27/2019    CL 95 (L) 09/27/2019    CO2 25 09/27/2019    BUN 17 09/27/2019    CREATININE 1.1 09/27/2019    GLUCOSE 102 (H) 09/27/2019    CALCIUM 9.9 09/27/2019    PROT 7.8 09/27/2019    LABALBU 4.6 09/27/2019    BILITOT 0.5 09/27/2019    ALKPHOS 48 09/27/2019    AST 18 09/27/2019    ALT 17 09/27/2019    LABGLOM >60 09/27/2019    GFRAA >60 09/27/2019    AGRATIO 1.4 09/27/2019    GLOB 3.2 09/27/2019          Assessment:      Diabetes mellitus Type II, under excellent control. Diagnosis Orders   1. Controlled type 2 diabetes mellitus with diabetic polyneuropathy, without long-term current use of insulin (HCC)  POCT glycosylated hemoglobin (Hb A1C)    TSH without Reflex    Lipid Panel    Comprehensive Metabolic Panel   2. Prostate cancer screening  Psa screening   3. Essential hypertension  CBC Auto Differential   4. Left wrist pain  Jonatan Bryan MD, Hand Surgery (Hand, Wrist, Elbow), [facility]   5. Erectile dysfunction, unspecified erectile dysfunction type  Gabi Rudd MD, Endocrinology, [facility]        Plan:      Discussed foot care.

## 2020-01-17 ENCOUNTER — OFFICE VISIT (OUTPATIENT)
Dept: FAMILY MEDICINE CLINIC | Age: 55
End: 2020-01-17
Payer: MEDICARE

## 2020-01-17 VITALS
OXYGEN SATURATION: 95 % | DIASTOLIC BLOOD PRESSURE: 70 MMHG | HEIGHT: 67 IN | BODY MASS INDEX: 40.81 KG/M2 | HEART RATE: 71 BPM | SYSTOLIC BLOOD PRESSURE: 120 MMHG | WEIGHT: 260 LBS | RESPIRATION RATE: 14 BRPM

## 2020-01-17 PROBLEM — Z72.0 TOBACCO ABUSE: Status: ACTIVE | Noted: 2017-08-14

## 2020-01-17 PROBLEM — G89.4 CHRONIC PAIN DISORDER: Status: ACTIVE | Noted: 2017-08-14

## 2020-01-17 PROBLEM — I10 BENIGN ESSENTIAL HYPERTENSION: Status: ACTIVE | Noted: 2017-08-14

## 2020-01-17 PROBLEM — E11.9 DIET-CONTROLLED DIABETES MELLITUS (HCC): Status: ACTIVE | Noted: 2017-08-14

## 2020-01-17 LAB — HBA1C MFR BLD: 5.8 %

## 2020-01-17 PROCEDURE — G8427 DOCREV CUR MEDS BY ELIG CLIN: HCPCS | Performed by: FAMILY MEDICINE

## 2020-01-17 PROCEDURE — 3044F HG A1C LEVEL LT 7.0%: CPT | Performed by: FAMILY MEDICINE

## 2020-01-17 PROCEDURE — G8417 CALC BMI ABV UP PARAM F/U: HCPCS | Performed by: FAMILY MEDICINE

## 2020-01-17 PROCEDURE — 99214 OFFICE O/P EST MOD 30 MIN: CPT | Performed by: FAMILY MEDICINE

## 2020-01-17 PROCEDURE — 2022F DILAT RTA XM EVC RTNOPTHY: CPT | Performed by: FAMILY MEDICINE

## 2020-01-17 PROCEDURE — 83036 HEMOGLOBIN GLYCOSYLATED A1C: CPT | Performed by: FAMILY MEDICINE

## 2020-01-17 PROCEDURE — G8482 FLU IMMUNIZE ORDER/ADMIN: HCPCS | Performed by: FAMILY MEDICINE

## 2020-01-17 PROCEDURE — 4004F PT TOBACCO SCREEN RCVD TLK: CPT | Performed by: FAMILY MEDICINE

## 2020-01-17 PROCEDURE — 3017F COLORECTAL CA SCREEN DOC REV: CPT | Performed by: FAMILY MEDICINE

## 2020-01-17 RX ORDER — VARENICLINE TARTRATE 1 MG/1
1 TABLET, FILM COATED ORAL 2 TIMES DAILY
COMMUNITY
End: 2020-01-28

## 2020-01-17 ASSESSMENT — PATIENT HEALTH QUESTIONNAIRE - PHQ9
1. LITTLE INTEREST OR PLEASURE IN DOING THINGS: 0
SUM OF ALL RESPONSES TO PHQ QUESTIONS 1-9: 0
SUM OF ALL RESPONSES TO PHQ QUESTIONS 1-9: 0
SUM OF ALL RESPONSES TO PHQ9 QUESTIONS 1 & 2: 0
2. FEELING DOWN, DEPRESSED OR HOPELESS: 0

## 2020-01-17 ASSESSMENT — ENCOUNTER SYMPTOMS
ABDOMINAL PAIN: 1
DIARRHEA: 1

## 2020-01-28 ENCOUNTER — OFFICE VISIT (OUTPATIENT)
Dept: ORTHOPEDIC SURGERY | Age: 55
End: 2020-01-28
Payer: MEDICARE

## 2020-01-28 VITALS — BODY MASS INDEX: 40.8 KG/M2 | WEIGHT: 259.92 LBS | HEIGHT: 67 IN

## 2020-01-28 PROCEDURE — G8417 CALC BMI ABV UP PARAM F/U: HCPCS | Performed by: ORTHOPAEDIC SURGERY

## 2020-01-28 PROCEDURE — 99203 OFFICE O/P NEW LOW 30 MIN: CPT | Performed by: ORTHOPAEDIC SURGERY

## 2020-01-28 PROCEDURE — G8482 FLU IMMUNIZE ORDER/ADMIN: HCPCS | Performed by: ORTHOPAEDIC SURGERY

## 2020-01-28 PROCEDURE — G8427 DOCREV CUR MEDS BY ELIG CLIN: HCPCS | Performed by: ORTHOPAEDIC SURGERY

## 2020-01-28 PROCEDURE — L3908 WHO COCK-UP NONMOLDE PRE OTS: HCPCS | Performed by: ORTHOPAEDIC SURGERY

## 2020-01-28 RX ORDER — VARENICLINE TARTRATE 1 MG/1
TABLET, FILM COATED ORAL
Qty: 60 TABLET | Refills: 1 | Status: SHIPPED | OUTPATIENT
Start: 2020-01-28 | End: 2020-03-06 | Stop reason: SDUPTHER

## 2020-01-28 RX ORDER — METHYLPREDNISOLONE 4 MG/1
TABLET ORAL
Qty: 1 KIT | Refills: 0 | Status: SHIPPED | OUTPATIENT
Start: 2020-01-28 | End: 2020-02-03

## 2020-01-28 RX ORDER — PREGABALIN 150 MG/1
CAPSULE ORAL
Qty: 60 CAPSULE | Refills: 0 | Status: SHIPPED | OUTPATIENT
Start: 2020-01-28 | End: 2020-03-06 | Stop reason: SDUPTHER

## 2020-01-28 NOTE — PROGRESS NOTES
EPIDURAL STEROID INJECTION SITE CONFIRMED BY FLUOROSCOPY performed by Laith Jovel MD at [address]      LUMBAR SPINE SURGERY Bilateral 9/20/2019    BILATERAL L3 TRANSFORAMINAL EPIDURAL STEROID INJECTION WITH FLUOROSCOPY performed by Laith Jovel MD at [address] [place] [address] History   Problem Relation Age of Onset    Cancer Mother     Heart Disease Father     High Blood Pressure Father      Social History     Socioeconomic History    Marital status: Legally      Spouse name: None    Number of children: None    Years of education: None    Highest education level: None   Occupational History    None   Social Needs    Financial resource strain: None    Food insecurity:     Worry: None     Inability: None    Transportation needs:     Medical: None     Non-medical: None   Tobacco Use    Smoking status: Current Every Day Smoker     Packs/day: 1.00     Years: 30.00     Pack years: 30.00     Types: Cigarettes    Smokeless tobacco: Never Used    Tobacco comment: Is on Chantix   Substance and Sexual Activity    Alcohol use: No    Drug use: Yes     Types: Marijuana     Comment: Medical marijuana card     Sexual activity: Yes   Lifestyle    Physical activity:     Days per week: None     Minutes per session: None    Stress: None   Relationships    Social connections:     Talks on phone: None     Gets together: None     Attends Baptism service: None     Active member of club or organization: None     Attends meetings of clubs or organizations: None     Relationship status: None    Intimate partner violence:     Fear of current or ex partner: None     Emotionally abused: None     Physically abused: None     Forced sexual activity: None   Other Topics Concern    None   Social History Narrative    None     Current Outpatient Medications   Medication Sig Dispense Refill    varenicline (CHANTIX) 1 MG tablet Take 1 mg by mouth 2 times daily      levothyroxine (SYNTHROID) 75 MCG tablet Take 1 tablet by mouth daily 90 tablet 0    pregabalin (LYRICA) 150 MG capsule TAKE ONE CAPSULE BY MOUTH TWICE DAILY 60 capsule 0    lisinopril (PRINIVIL;ZESTRIL) 20 MG tablet Take 1 tablet by mouth daily 90 tablet 1    sildenafil (VIAGRA) 100 MG tablet Take 1 tablet by mouth as needed for Erectile Dysfunction 30 tablet 3    medical marijuana Take by mouth as needed. No current facility-administered medications for this visit. Allergies   Allergen Reactions    Mobic [Meloxicam]      Upset stomach    Morphine Sulfate Itching     Pt is only allergic to Morphine ER, not IVP Morphine. ROS:  ROS neg except for positives in HPI    PHYSICAL EXAMINATION:    Gen/Psych: Examination reveals a pleasant individual in no acute distress. The patient is oriented to time, place and person. The patient's mood and affect are appropriate. Lymph: The lymphatic examination bilaterally reveals all areas to be without enlargement or induration. Skin intact without lymphadenopathy, discoloration, or abnormal temperature. Vascular: There is intact, symmetric circulation in both upper extremities. Musculoskeletal       Cervical spine, shoulders, elbows, digits:    satisfactory pain-free range of motion,                                                                           strength, and stability       Left wrist:  Pain to palpation in the Ulnar, Distal and Dorsal wrist, over the area of the distal ECU. Swelling is not present. Strength and ROM limited by pain. No pain or swelling and full ROM of other digits/hand  There is not clinical evidence of skeletal deformity or mal-rotation. No DRUJ instability. Ulnar deviation does not cause popping        contralateral wrist : normal ROM without pain. No pain on palpation. No swelling. Normal strength.                                        Neurological:  Essentially baseline normal     DIAGNOSTIC TESTING: X-rays obtained and reviewed in office:  Views 3  Location Left wrist  Impression Multiple views of affected wrist demonstrated satisfactory articular congruity with absence of fracture. IMPRESSION AND PLAN: Left wrist pain, left wrist sprain, ECU tendinitis    We discussed diagnosis along with conservative measures. We will provide a brace and discussed activity modifications. Procedures    Titan Wrist Orthosis Brace     Patient was prescribed a Regla Salazar Titan Wrist Orthosis. The left wrist will require stabilization / immobilization from this semi-rigid / rigid orthosis to improve their function. The orthosis will assist in protecting the affected area, provide functional support and facilitate healing. The patient was educated and fit by a healthcare professional with expert knowledge and specialization in brace application while under the direct supervision of the treating physician. Verbal and written instructions for the use of and application of this item were provided. They were instructed to contact the office immediately should the brace result in increased pain, decreased sensation, increased swelling or worsening of the condition. We offered a Medrol Dosepak, he states he has taken this in the past without problem. He will follow-up if symptoms are not resolving. If symptoms persist we would consider localized cortisone injection or referral to therapy. We appreciate the patient referral    All questions and concerns were addressed today. Patient is in agreement with the plan. Razia Jackman MD  Hand & Upper Extremity Surgery  [address]  A partner of [facility] ([facility])        Please note that this transcription was created using voice recognition software. Any errors are unintentional and may be due to voice recognition transcription.

## 2020-03-02 ENCOUNTER — OFFICE VISIT (OUTPATIENT)
Dept: FAMILY MEDICINE CLINIC | Age: 55
End: 2020-03-02
Payer: MEDICARE

## 2020-03-02 ENCOUNTER — TELEPHONE (OUTPATIENT)
Dept: FAMILY MEDICINE CLINIC | Age: 55
End: 2020-03-02

## 2020-03-02 VITALS
BODY MASS INDEX: 41.03 KG/M2 | SYSTOLIC BLOOD PRESSURE: 138 MMHG | RESPIRATION RATE: 16 BRPM | WEIGHT: 262 LBS | DIASTOLIC BLOOD PRESSURE: 80 MMHG | HEART RATE: 77 BPM | OXYGEN SATURATION: 97 % | TEMPERATURE: 98.7 F

## 2020-03-02 PROCEDURE — G8482 FLU IMMUNIZE ORDER/ADMIN: HCPCS | Performed by: NURSE PRACTITIONER

## 2020-03-02 PROCEDURE — 3017F COLORECTAL CA SCREEN DOC REV: CPT | Performed by: NURSE PRACTITIONER

## 2020-03-02 PROCEDURE — 99214 OFFICE O/P EST MOD 30 MIN: CPT | Performed by: NURSE PRACTITIONER

## 2020-03-02 PROCEDURE — 4004F PT TOBACCO SCREEN RCVD TLK: CPT | Performed by: NURSE PRACTITIONER

## 2020-03-02 PROCEDURE — G8417 CALC BMI ABV UP PARAM F/U: HCPCS | Performed by: NURSE PRACTITIONER

## 2020-03-02 PROCEDURE — G8427 DOCREV CUR MEDS BY ELIG CLIN: HCPCS | Performed by: NURSE PRACTITIONER

## 2020-03-02 PROCEDURE — G8510 SCR DEP NEG, NO PLAN REQD: HCPCS | Performed by: NURSE PRACTITIONER

## 2020-03-02 RX ORDER — CIPROFLOXACIN HYDROCHLORIDE 3.5 MG/ML
1 SOLUTION/ DROPS TOPICAL
Qty: 1 BOTTLE | Refills: 0 | Status: SHIPPED | OUTPATIENT
Start: 2020-03-02 | End: 2020-04-27 | Stop reason: ALTCHOICE

## 2020-03-02 ASSESSMENT — ENCOUNTER SYMPTOMS
EYE REDNESS: 1
COUGH: 0
EYE ITCHING: 1
SINUS PRESSURE: 0
SINUS PAIN: 0
SHORTNESS OF BREATH: 0
EYE DISCHARGE: 1
WHEEZING: 0

## 2020-03-02 ASSESSMENT — PATIENT HEALTH QUESTIONNAIRE - PHQ9
SUM OF ALL RESPONSES TO PHQ QUESTIONS 1-9: 0
SUM OF ALL RESPONSES TO PHQ QUESTIONS 1-9: 0
1. LITTLE INTEREST OR PLEASURE IN DOING THINGS: 0
SUM OF ALL RESPONSES TO PHQ9 QUESTIONS 1 & 2: 0
2. FEELING DOWN, DEPRESSED OR HOPELESS: 0

## 2020-03-02 NOTE — PROGRESS NOTES
Subjective:      Chief Complaint   Patient presents with    Conjunctivitis     L - woke up yesterday morning with it       Patient ID: Antony Allen. is a 53 y.o. male who presents for bilateral conjunctivitis. Woke 2 days ago with crusty drainage over left  Eye. Now spreading to right eye. +drainage, redness, irritation, burning, and itching. Has been using warm compresses to eyes with minimal relief. No visual disturbances. When there is a film over the eyes, it is blurry, but subsides after blinking and cleansing eyes. Has been exposed to a coworker who has had conjunctivitis intermittently. No other symptoms. Conjunctivitis    The current episode started 2 days ago. The onset was sudden. The problem has been gradually worsening. The problem is severe. Associated symptoms include eye itching, ear pain (Burning and irritation), eye discharge and eye redness. Pertinent negatives include no fever, no congestion, no ear discharge, no cough, no URI and no wheezing. There were sick contacts at work.        Family History   Problem Relation Age of Onset    Cancer Mother     Heart Disease Father     High Blood Pressure Father        Social History     Socioeconomic History    Marital status: Legally      Spouse name: Not on file    Number of children: Not on file    Years of education: Not on file    Highest education level: Not on file   Occupational History    Not on file   Social Needs    Financial resource strain: Not on file    Food insecurity:     Worry: Not on file     Inability: Not on file    Transportation needs:     Medical: Not on file     Non-medical: Not on file   Tobacco Use    Smoking status: Current Every Day Smoker     Packs/day: 1.00     Years: 30.00     Pack years: 30.00     Types: Cigarettes    Smokeless tobacco: Never Used    Tobacco comment: Is on Chantix   Substance and Sexual Activity    Alcohol use: No    Drug use: Yes     Types: Marijuana     Comment: Medical Gastrointestinal:        History of rectal polyp   Endocrine:        Hypothyroidism, levothyroxine. Last TSH 6.03 on September 2019. History of diet controlled diabetes. Last hemoglobin A1c was 5.8 in January 2020   Genitourinary:        Viagra as needed  History of hypogonadism   Musculoskeletal:        Chronic pain disorder, medical marijuana and Lyrica. Neurological:        History of cerebrovascular accident. Psychiatric/Behavioral:        History of depression no treatment       Objective:     Physical Exam  Vitals signs and nursing note reviewed. Constitutional:       General: He is not in acute distress. Appearance: He is obese. He is not toxic-appearing or diaphoretic. HENT:      Head: Normocephalic and atraumatic. Right Ear: Tympanic membrane, ear canal and external ear normal.      Left Ear: Tympanic membrane, ear canal and external ear normal.      Nose: Nose normal. No congestion or rhinorrhea. Mouth/Throat:      Mouth: Mucous membranes are moist.      Pharynx: Oropharynx is clear. No oropharyngeal exudate or posterior oropharyngeal erythema. Eyes:      General:         Right eye: Discharge present. Left eye: Discharge present. Extraocular Movements: Extraocular movements intact. Pupils: Pupils are equal, round, and reactive to light. Comments: Bilateral eyes with drainage and redness. Neck:      Musculoskeletal: Normal range of motion. Cardiovascular:      Rate and Rhythm: Normal rate and regular rhythm. Heart sounds: Normal heart sounds. No murmur. No friction rub. No gallop. Pulmonary:      Effort: Pulmonary effort is normal. No respiratory distress. Breath sounds: Normal breath sounds. No stridor. No wheezing, rhonchi or rales. Musculoskeletal: Normal range of motion. Skin:     General: Skin is warm and dry. Capillary Refill: Capillary refill takes less than 2 seconds. Neurological:      General: No focal deficit present. Mental Status: He is alert and oriented to person, place, and time. Mental status is at baseline. Motor: No weakness. Gait: Gait normal.   Psychiatric:         Mood and Affect: Mood normal.         Behavior: Behavior normal.         Thought Content: Thought content normal.         Judgment: Judgment normal.         Assessment:       ICD-10-CM    1. Bacterial conjunctivitis of both eyes H10.9          Plan:     1. Bacterial conjunctivitis of both eyes  Cipro  Warm moist compresses, avoid scratching  Hand hygiene discussed    Call if no better in 2 to 3 days.   Follow-up as needed

## 2020-03-06 ENCOUNTER — TELEPHONE (OUTPATIENT)
Dept: FAMILY MEDICINE CLINIC | Age: 55
End: 2020-03-06

## 2020-03-06 ENCOUNTER — NURSE TRIAGE (OUTPATIENT)
Dept: OTHER | Facility: CLINIC | Age: 55
End: 2020-03-06

## 2020-03-06 ENCOUNTER — OFFICE VISIT (OUTPATIENT)
Dept: FAMILY MEDICINE CLINIC | Age: 55
End: 2020-03-06
Payer: MEDICARE

## 2020-03-06 VITALS
BODY MASS INDEX: 39.71 KG/M2 | HEIGHT: 68 IN | TEMPERATURE: 97.8 F | RESPIRATION RATE: 16 BRPM | DIASTOLIC BLOOD PRESSURE: 79 MMHG | OXYGEN SATURATION: 98 % | SYSTOLIC BLOOD PRESSURE: 122 MMHG | WEIGHT: 262 LBS | HEART RATE: 71 BPM

## 2020-03-06 LAB
INFLUENZA A ANTIBODY: NORMAL
INFLUENZA B ANTIBODY: NORMAL
S PYO AG THROAT QL: NORMAL

## 2020-03-06 PROCEDURE — 99213 OFFICE O/P EST LOW 20 MIN: CPT | Performed by: FAMILY MEDICINE

## 2020-03-06 PROCEDURE — 87804 INFLUENZA ASSAY W/OPTIC: CPT | Performed by: FAMILY MEDICINE

## 2020-03-06 PROCEDURE — 87880 STREP A ASSAY W/OPTIC: CPT | Performed by: FAMILY MEDICINE

## 2020-03-06 PROCEDURE — G8482 FLU IMMUNIZE ORDER/ADMIN: HCPCS | Performed by: FAMILY MEDICINE

## 2020-03-06 PROCEDURE — 4004F PT TOBACCO SCREEN RCVD TLK: CPT | Performed by: FAMILY MEDICINE

## 2020-03-06 PROCEDURE — 3017F COLORECTAL CA SCREEN DOC REV: CPT | Performed by: FAMILY MEDICINE

## 2020-03-06 PROCEDURE — G8417 CALC BMI ABV UP PARAM F/U: HCPCS | Performed by: FAMILY MEDICINE

## 2020-03-06 PROCEDURE — 3044F HG A1C LEVEL LT 7.0%: CPT | Performed by: FAMILY MEDICINE

## 2020-03-06 PROCEDURE — G8427 DOCREV CUR MEDS BY ELIG CLIN: HCPCS | Performed by: FAMILY MEDICINE

## 2020-03-06 PROCEDURE — 2022F DILAT RTA XM EVC RTNOPTHY: CPT | Performed by: FAMILY MEDICINE

## 2020-03-06 RX ORDER — VARENICLINE TARTRATE 1 MG/1
TABLET, FILM COATED ORAL
Qty: 60 TABLET | Refills: 1 | Status: SHIPPED | OUTPATIENT
Start: 2020-03-06 | End: 2020-04-27 | Stop reason: ALTCHOICE

## 2020-03-06 RX ORDER — OSELTAMIVIR PHOSPHATE 75 MG/1
75 CAPSULE ORAL 2 TIMES DAILY
Qty: 10 CAPSULE | Refills: 0 | Status: SHIPPED | OUTPATIENT
Start: 2020-03-06 | End: 2020-03-11

## 2020-03-06 RX ORDER — FLUTICASONE PROPIONATE 50 MCG
1 SPRAY, SUSPENSION (ML) NASAL DAILY
Qty: 1 BOTTLE | Refills: 0 | Status: CANCELLED | OUTPATIENT
Start: 2020-03-06 | End: 2020-03-13

## 2020-03-06 RX ORDER — BENZONATATE 200 MG/1
200 CAPSULE ORAL 3 TIMES DAILY PRN
Qty: 30 CAPSULE | Refills: 2 | Status: SHIPPED | OUTPATIENT
Start: 2020-03-06 | End: 2020-04-27 | Stop reason: ALTCHOICE

## 2020-03-06 RX ORDER — PREGABALIN 150 MG/1
CAPSULE ORAL
Qty: 60 CAPSULE | Refills: 0 | Status: SHIPPED | OUTPATIENT
Start: 2020-03-06 | End: 2020-05-06

## 2020-03-06 RX ORDER — OSELTAMIVIR PHOSPHATE 75 MG/1
75 CAPSULE ORAL 2 TIMES DAILY
Qty: 10 CAPSULE | Refills: 0 | Status: CANCELLED | OUTPATIENT
Start: 2020-03-06 | End: 2020-03-11

## 2020-03-06 RX ORDER — LISINOPRIL 20 MG/1
20 TABLET ORAL DAILY
Qty: 90 TABLET | Refills: 1 | Status: SHIPPED | OUTPATIENT
Start: 2020-03-06 | End: 2020-09-08 | Stop reason: SDUPTHER

## 2020-03-06 RX ORDER — LEVOTHYROXINE SODIUM 0.07 MG/1
75 TABLET ORAL DAILY
Qty: 90 TABLET | Refills: 0 | Status: SHIPPED | OUTPATIENT
Start: 2020-03-06 | End: 2020-04-03

## 2020-03-06 RX ORDER — BENZONATATE 100 MG/1
100 CAPSULE ORAL 3 TIMES DAILY PRN
Qty: 30 CAPSULE | Refills: 1 | Status: CANCELLED | OUTPATIENT
Start: 2020-03-06 | End: 2020-03-16

## 2020-03-06 ASSESSMENT — ENCOUNTER SYMPTOMS
SORE THROAT: 1
COUGH: 1

## 2020-03-06 NOTE — TELEPHONE ENCOUNTER
Patient called in stating he was prescribed some medication today, but the one he needed was temaflu and he didn't receive it. Would like for it to be called in.

## 2020-03-06 NOTE — PROGRESS NOTES
BY MOUTH TWICE DAILY 60 capsule 0    ciprofloxacin (CILOXAN) 0.3 % ophthalmic solution Place 1 drop into both eyes every 2 hours Use 1 drop to affected eye (s) every 2 hours while awake for 2 days, then every 4 hours for 5 days 1 Bottle 0    sildenafil (VIAGRA) 100 MG tablet Take 1 tablet by mouth as needed for Erectile Dysfunction 30 tablet 3    medical marijuana Take by mouth as needed. No current facility-administered medications for this visit. Immunization History   Administered Date(s) Administered    Influenza Whole 10/28/2010    Influenza, Quadv, IM, PF (6 mo and older Fluzone, Flulaval, Fluarix, and 3 yrs and older Afluria) 09/27/2019    Pneumococcal Polysaccharide (Kancasphb12) 05/24/2019    Tdap (Boostrix, Adacel) 05/28/2019       Allergies   Allergen Reactions    Mobic [Meloxicam]      Upset stomach    Morphine Sulfate Itching     Pt is only allergic to Morphine ER, not IVP Morphine. Office Visit on 01/17/2020   Component Date Value Ref Range Status    Hemoglobin A1C 01/17/2020 5.8  % Final       Review of Systems   Constitutional: Negative for chills and fever. Positive for body aches   HENT: Positive for congestion, ear pain (left) and sore throat. Respiratory: Positive for cough. Neurological: Positive for headaches. /79 (Site: Right Upper Arm, Position: Sitting, Cuff Size: Medium Adult)   Pulse 71   Temp 97.8 °F (36.6 °C) (Oral)   Resp 16   Ht 5' 8\" (1.727 m)   Wt 262 lb (118.8 kg)   SpO2 98%   BMI 39.84 kg/m²     Physical Exam  Vitals signs reviewed. Constitutional:       Appearance: He is well-developed. He is ill-appearing. HENT:      Head: Normocephalic and atraumatic. Nose:      Comments: Fluid behind both tympanic membranes, bilateral turbinate edema and erythema       Mouth/Throat:      Mouth: Mucous membranes are moist.      Pharynx: Posterior oropharyngeal erythema present. No oropharyngeal exudate.    Eyes:      Pupils: Pupils are equal, round, and reactive to light. Neck:      Musculoskeletal: Normal range of motion. Cardiovascular:      Rate and Rhythm: Normal rate and regular rhythm. Heart sounds: Normal heart sounds. Pulmonary:      Effort: Pulmonary effort is normal.      Breath sounds: Normal breath sounds. No wheezing, rhonchi or rales. Abdominal:      General: Bowel sounds are normal.      Tenderness: There is no abdominal tenderness. Neurological:      Mental Status: He is alert and oriented to person, place, and time. Psychiatric:         Behavior: Behavior normal.         Thought Content: Thought content normal.         Judgment: Judgment normal.         Plan   Diagnosis Orders   1. Flu-like symptoms  POCT Influenza A/B - POSITIVE TYPE A   2. Hypothyroidism, unspecified type  levothyroxine (SYNTHROID) 75 MCG tablet   3. Diabetic polyneuropathy associated with type 2 diabetes mellitus (HCC)  pregabalin (LYRICA) 150 MG capsule   4. Pharyngitis                                                               Negative Strep test                                                                  Note provided for 4 days off. Patient has days off during the middle of the week so he will take a total of 5 days off. Return if symptoms worsen or fail to improve. Prior to Visit Medications    Medication Sig Taking?  Authorizing Provider   varenicline (CHANTIX) 1 MG tablet TAKE 1 TABLET BY MOUTH TWICE DAILY Yes Anahi Gregorio, DO   levothyroxine (SYNTHROID) 75 MCG tablet Take 1 tablet by mouth daily Yes Anahi Gregorio, DO   lisinopril (PRINIVIL;ZESTRIL) 20 MG tablet Take 1 tablet by mouth daily Yes Anahi Gregorio, DO   pregabalin (LYRICA) 150 MG capsule TAKE 1 CAPSULE BY MOUTH TWICE DAILY Yes Anahi Gregorio, DO   ciprofloxacin (CILOXAN) 0.3 % ophthalmic solution Place 1 drop into both eyes every 2 hours Use 1 drop to affected eye (s) every 2 hours while awake for 2 days, then every 4 hours for 5 days Yes Dwight Faustin, APRN - CNP   sildenafil (VIAGRA) 100 MG tablet Take 1 tablet by mouth as needed for Erectile Dysfunction Yes Marianelalyshanel Gave, DO   medical marijuana Take by mouth as needed.  Yes Historical Provider, MD

## 2020-03-10 ENCOUNTER — OFFICE VISIT (OUTPATIENT)
Dept: ORTHOPEDIC SURGERY | Age: 55
End: 2020-03-10
Payer: MEDICARE

## 2020-03-10 VITALS — WEIGHT: 261.91 LBS | HEIGHT: 68 IN | BODY MASS INDEX: 39.69 KG/M2

## 2020-03-10 PROCEDURE — 3017F COLORECTAL CA SCREEN DOC REV: CPT | Performed by: ORTHOPAEDIC SURGERY

## 2020-03-10 PROCEDURE — G8427 DOCREV CUR MEDS BY ELIG CLIN: HCPCS | Performed by: ORTHOPAEDIC SURGERY

## 2020-03-10 PROCEDURE — G8482 FLU IMMUNIZE ORDER/ADMIN: HCPCS | Performed by: ORTHOPAEDIC SURGERY

## 2020-03-10 PROCEDURE — 4004F PT TOBACCO SCREEN RCVD TLK: CPT | Performed by: ORTHOPAEDIC SURGERY

## 2020-03-10 PROCEDURE — G8417 CALC BMI ABV UP PARAM F/U: HCPCS | Performed by: ORTHOPAEDIC SURGERY

## 2020-03-10 PROCEDURE — 20550 NJX 1 TENDON SHEATH/LIGAMENT: CPT | Performed by: ORTHOPAEDIC SURGERY

## 2020-03-10 PROCEDURE — 99213 OFFICE O/P EST LOW 20 MIN: CPT | Performed by: ORTHOPAEDIC SURGERY

## 2020-03-10 RX ORDER — BETAMETHASONE SODIUM PHOSPHATE AND BETAMETHASONE ACETATE 3; 3 MG/ML; MG/ML
12 INJECTION, SUSPENSION INTRA-ARTICULAR; INTRALESIONAL; INTRAMUSCULAR; SOFT TISSUE ONCE
Status: COMPLETED | OUTPATIENT
Start: 2020-03-10 | End: 2020-03-10

## 2020-03-10 RX ORDER — LIDOCAINE HYDROCHLORIDE 10 MG/ML
20 INJECTION, SOLUTION INFILTRATION; PERINEURAL ONCE
Status: COMPLETED | OUTPATIENT
Start: 2020-03-10 | End: 2020-03-10

## 2020-03-10 RX ADMIN — BETAMETHASONE SODIUM PHOSPHATE AND BETAMETHASONE ACETATE 12 MG: 3; 3 INJECTION, SUSPENSION INTRA-ARTICULAR; INTRALESIONAL; INTRAMUSCULAR; SOFT TISSUE at 08:49

## 2020-03-10 RX ADMIN — LIDOCAINE HYDROCHLORIDE 20 ML: 10 INJECTION, SOLUTION INFILTRATION; PERINEURAL at 08:49

## 2020-03-10 NOTE — PROGRESS NOTES
note that this transcription was created using voice recognition software. Any errors are unintentional and may be due to voice recognition transcription.

## 2020-04-03 RX ORDER — LEVOTHYROXINE SODIUM 0.07 MG/1
75 TABLET ORAL DAILY
Qty: 90 TABLET | Refills: 0 | Status: SHIPPED | OUTPATIENT
Start: 2020-04-03 | End: 2020-09-26 | Stop reason: DRUGHIGH

## 2020-04-27 ENCOUNTER — VIRTUAL VISIT (OUTPATIENT)
Dept: ENDOCRINOLOGY | Age: 55
End: 2020-04-27
Payer: MEDICARE

## 2020-04-27 PROCEDURE — G8427 DOCREV CUR MEDS BY ELIG CLIN: HCPCS | Performed by: INTERNAL MEDICINE

## 2020-04-27 PROCEDURE — 99203 OFFICE O/P NEW LOW 30 MIN: CPT | Performed by: INTERNAL MEDICINE

## 2020-04-27 PROCEDURE — 3017F COLORECTAL CA SCREEN DOC REV: CPT | Performed by: INTERNAL MEDICINE

## 2020-04-27 ASSESSMENT — ENCOUNTER SYMPTOMS
COUGH: 0
BACK PAIN: 0
PHOTOPHOBIA: 0

## 2020-04-27 NOTE — PROGRESS NOTES
PMH of HTN, hypothyroidism, obesity, stroke. Labs in 06/19 showed a total testosterone of 312 and free Testosterone of 69    Symptoms of hypogonadism: Decreased libido, erectile dysfunction, fatigue for 1-2 yrs                        History of head trauma, surgery, radiation:No  History of testicular trauma, infection:No  Sexually active : not currently. Children: 3 kids. Use of chronic pain medications:No    Used of phosphodiesterase inhibitors: On viagra 100 mg daily. Review of Systems   Constitutional: Positive for fatigue. Negative for chills, diaphoresis and fever. Eyes: Negative for photophobia. Respiratory: Negative for cough. Cardiovascular: Negative for chest pain and palpitations. Endocrine: Negative for polydipsia. Genitourinary: Negative for dysuria, flank pain, frequency, hematuria and urgency. Musculoskeletal: Positive for myalgias. Negative for back pain and neck pain. Skin: Negative for rash. Allergic/Immunologic: Negative for environmental allergies. Neurological: Negative for dizziness, tremors, seizures and headaches. Hematological: Does not bruise/bleed easily. Psychiatric/Behavioral: Negative for hallucinations and suicidal ideas. The patient is not nervous/anxious. Exam.  Brief exam on video visit  Patient alert,  Awake, oriented. Normal speech  No distress noted  Normal eyes   Normal hearing  Normal  hand movements. Assessment/Plan        1. Hypogonadism    This 63 y.o. yrs old male was found to have normal testosterone. His symptoms include loss of libido. Erectile dysfunction, fatigue for 2 yrs. Discussed that given his testosterone is normal, treatment is not recommended.    -Will repeat morning free and total testosterone with SHBG. Based on repeat levels, will make further recommendations. 2. Hypothyroidism On levothyroxine  Managed by PCP    3. Obesity. Advised to work on life style to lose weight.    Has been

## 2020-05-06 RX ORDER — PREGABALIN 150 MG/1
CAPSULE ORAL
Qty: 60 CAPSULE | Refills: 0 | Status: SHIPPED | OUTPATIENT
Start: 2020-05-06 | End: 2020-09-08 | Stop reason: SDUPTHER

## 2020-06-23 NOTE — TELEPHONE ENCOUNTER
Future Appointments   Date Time Provider Clair Valencia   4/27/2020  8:40 AM MD Fausto Lin Endo [facility]   7/17/2020  8:30 AM DO TAMARA Cruz  [facility] 3/6/2020 Subjective:  No acute events overnight.    Vital Signs:  Vital Signs Last 24 Hrs  T(C): 36.8 (06-23-20 @ 10:00), Max: 37.6 (06-22-20 @ 20:00)  T(F): 98.2 (06-23-20 @ 10:00), Max: 99.6 (06-22-20 @ 20:00)  HR: 97 (06-23-20 @ 10:00) (63 - 115)  BP: 125/74 (06-23-20 @ 10:00) (85/56 - 151/98)  RR: 24 (06-23-20 @ 10:00) (12 - 34)  SpO2: 97% (06-23-20 @ 10:00) (89% - 98%) on on TC.    Telemetry/Alarms: atrial fibrillation    Relevant labs, radiology and Medications reviewed                        10.4   5.75  )-----------( 225      ( 22 Jun 2020 06:22 )             32.7     06-22    140  |  103  |  27<H>  ----------------------------<  106<H>  3.7   |  32<H>  |  0.48<L>    Ca    9.4      22 Jun 2020 06:22  Phos  5.0     06-22  Mg     1.9     06-22        MEDICATIONS  (STANDING):  ALBUTerol    90 MICROgram(s) HFA Inhaler 2 Puff(s) Inhalation every 4 hours  ALPRAZolam 0.5 milliGRAM(s) Oral every 6 hours  aspirin  chewable 81 milliGRAM(s) Oral daily  budesonide 160 MICROgram(s)/formoterol 4.5 MICROgram(s) Inhaler 2 Puff(s) Inhalation two times a day  clopidogrel Tablet 75 milliGRAM(s) Oral daily  diltiazem    Tablet 30 milliGRAM(s) Oral every 6 hours  doxazosin 2 milliGRAM(s) Oral at bedtime  enoxaparin Injectable 40 milliGRAM(s) SubCutaneous every 12 hours  famotidine    Tablet 20 milliGRAM(s) Oral two times a day  gabapentin   Solution 400 milliGRAM(s) Enteral Tube three times a day  melatonin 5 milliGRAM(s) Oral at bedtime  metoprolol tartrate 25 milliGRAM(s) Oral two times a day  polyethylene glycol 3350 17 Gram(s) Oral daily  QUEtiapine 150 milliGRAM(s) Oral at bedtime  senna 2 Tablet(s) Oral at bedtime  thiamine 100 milliGRAM(s) Oral daily  tiotropium 18 MICROgram(s) Capsule 1 Capsule(s) Inhalation daily    MEDICATIONS  (PRN):  acetaminophen   Tablet .. 650 milliGRAM(s) Oral every 6 hours PRN Temp greater or equal to 38.5C (101.3F)  ibuprofen  Tablet. 400 milliGRAM(s) Oral every 6 hours PRN Moderate Pain (4 - 6)      Physical exam  Gen: NAD breathing comfortably  Neuro: AO  Card: S1 S2  Pulm: equal bilaterally CXR is clear  Abd: soft PEG in place  Ext: no edema      I&O's Summary    22 Jun 2020 07:01  -  23 Jun 2020 07:00  --------------------------------------------------------  IN: 2345 mL / OUT: 0 mL / NET: 2345 mL    23 Jun 2020 07:01  -  23 Jun 2020 10:29  --------------------------------------------------------  IN: 61 mL / OUT: 0 mL / NET: 61 mL        Assessment  62y Male  w/ PAST MEDICAL & SURGICAL HISTORY:  Chronic CHF  COPD without exacerbation  Atrial fibrillation  Presence of Watchman left atrial appendage closure device  Hypertension  GERD (gastroesophageal reflux disease)  Chronic obstructive pulmonary disease (COPD)  Anemia  Falls  Meningitis  Collapsed lung  Alcohol withdrawal  Emphysema of lung  Cirrhosis  CHF (congestive heart failure)  Poor historian  Alcohol abuse  Chronic atrial fibrillation  Unilateral amputation of lower extremity below knee  Presence of Watchman left atrial appendage closure device  S/P BKA (below knee amputation) unilateral, left    Patient is a 62y old  Male who presents with a chief complaint of acute hypoxemic, hypercapneic resp failure  COPD exacerbation (23 Jun 2020 09:27)  .  1.  Chronic respiratory failure  2.  COPD exacerbation  3.  Pneumonia  4.  Leukocytosis and fevers - now resolved    Continue capping trach as tolerated.  Continue diet.  Would not decannulate this hospital admission.  May follow up as outpatient with Dr. Morris for trach removal.  Discharge planning.    Discussed with Cardiothoracic Team at AM rounds.

## 2020-07-27 ENCOUNTER — NURSE TRIAGE (OUTPATIENT)
Dept: OTHER | Facility: CLINIC | Age: 55
End: 2020-07-27

## 2020-07-27 ENCOUNTER — TELEPHONE (OUTPATIENT)
Dept: FAMILY MEDICINE CLINIC | Age: 55
End: 2020-07-27

## 2020-07-27 NOTE — TELEPHONE ENCOUNTER
Received call from [facility] in [facility]. Patient twist his right knee 10 days ago by stepping into to hole. Pain under the right kneecap is noted. Pain radiates from right knee to right hip area. The area is swollen to the right knee. No fever. Severe pain noted 6-7/10 noted to the right knee. Protocol recommendation shared to be seen within 4 hours. UCC shared for evaluation for immediate care. The patient  wants PCP to see him. Care advice shared. Call soft transferred to Severo Howard in [facility] to schedule appointment. Please do not reply to the triage nurse through this encounter. Any subsequent communication should be directly with the patient.     Reason for Disposition   [1] SEVERE pain (e.g., excruciating, unable to walk) AND [2] not improved after 2 hours of pain medicine    Protocols used: KNEE SWELLING-ADULT-AH

## 2020-07-31 ENCOUNTER — VIRTUAL VISIT (OUTPATIENT)
Dept: FAMILY MEDICINE CLINIC | Age: 55
End: 2020-07-31
Payer: MEDICARE

## 2020-07-31 PROCEDURE — 3017F COLORECTAL CA SCREEN DOC REV: CPT | Performed by: NURSE PRACTITIONER

## 2020-07-31 PROCEDURE — G8427 DOCREV CUR MEDS BY ELIG CLIN: HCPCS | Performed by: NURSE PRACTITIONER

## 2020-07-31 PROCEDURE — 99214 OFFICE O/P EST MOD 30 MIN: CPT | Performed by: NURSE PRACTITIONER

## 2020-07-31 NOTE — PROGRESS NOTES
2020    TELEHEALTH EVALUATION -- Audio/Visual (During DAW-53 public health emergency)    HPI:    Sheree House. (:  1965) has requested an audio/video evaluation for the following concern(s):    Right knee pain. States he stepped in a ditch and twisted his knee, [place] a pop. Knee is painful to touch. He asks if he can go to [facility] because he has been there before for so many injuries. Xray ordered. Instructed to keep leg elevated when possible, wrap when up and walking and ice at rest    Review of Systems    Prior to Visit Medications    Medication Sig Taking? Authorizing Provider   pregabalin (LYRICA) 150 MG capsule TAKE 1 CAPSULE BY MOUTH TWICE DAILY Yes Jennifer Mooney DO   levothyroxine (SYNTHROID) 75 MCG tablet TAKE 1 TABLET BY MOUTH DAILY Yes Jennifer Mooney DO   lisinopril (PRINIVIL;ZESTRIL) 20 MG tablet Take 1 tablet by mouth daily Yes Jennifer Mooney DO   sildenafil (VIAGRA) 100 MG tablet Take 1 tablet by mouth as needed for Erectile Dysfunction Yes Jennifer Mooney,    medical marijuana Take by mouth as needed.  Yes Historical Provider, MD       Social History     Tobacco Use    Smoking status: Current Every Day Smoker     Packs/day: 1.00     Years: 30.00     Pack years: 30.00     Types: Cigarettes    Smokeless tobacco: Never Used    Tobacco comment: Is on Chantix   Substance Use Topics    Alcohol use: No    Drug use: Yes     Types: Marijuana     Comment: Medical marijuana card             PHYSICAL EXAMINATION:  [ INSTRUCTIONS:  \"[x]\" Indicates a positive item  \"[]\" Indicates a negative item  -- DELETE ALL ITEMS NOT EXAMINED]  Vital Signs: (As obtained by patient/caregiver or practitioner observation)    Blood pressure-  Heart rate-    Respiratory rate-    Temperature-  Pulse oximetry-     Constitutional: [x] Appears well-developed and well-nourished [] No apparent distress      [] Abnormal-   Mental status  [x] Alert and awake  [x] Oriented to person/place/time [x]Able to follow commands      Eyes:  EOM    [x]  Normal  [] Abnormal-  Sclera  [x]  Normal  [] Abnormal -         Discharge [x]  None visible  [] Abnormal -    HENT:   [x] Normocephalic, atraumatic. [] Abnormal   [] Mouth/Throat: Mucous membranes are moist.     External Ears [] Normal  [] Abnormal-     Neck: [] No visualized mass     Pulmonary/Chest: [x] Respiratory effort normal.  [x] No visualized signs of difficulty breathing or respiratory distress        [] Abnormal-      Musculoskeletal:   [] Normal gait with no signs of ataxia         [x] Normal range of motion of neck        [] Abnormal-       Neurological:        [] No Facial Asymmetry (Cranial nerve 7 motor function) (limited exam to video visit)          [x] No gaze palsy        [] Abnormal-         Skin:        [x] No significant exanthematous lesions or discoloration noted on facial skin         [] Abnormal-            Psychiatric:       [x] Normal Affect [x] No Hallucinations        [] Abnormal-     Other pertinent observable physical exam findings-     ASSESSMENT/PLAN:  Acute pain of right knee   xray of right knee  Rest-Ice-Compress when up and Elevate as much as possible  referral to [facility]    Follow as needed w PCP Dr Gale Dean. is a 63 y.o. male being evaluated by a Virtual Visit (video visit) encounter to address concerns as mentioned above. A caregiver was present when appropriate. Due to this being a TeleHealth encounter (During PPVWY-88 public health emergency), evaluation of the following organ systems was limited: Vitals/Constitutional/EENT/Resp/CV/GI//MS/Neuro/Skin/Heme-Lymph-Imm.   Pursuant to the emergency declaration under the [address] [place] [address], [address] [place] [address] authority and the [employer] and [employer] Act, this Virtual Visit was conducted with patient's (and/or legal guardian's) consent, to reduce the patient's risk of

## 2020-07-31 NOTE — TELEPHONE ENCOUNTER
Future Appointments   Date Time Provider Clair Annie   7/31/2020  1:20 PM SATHISH Donahue CNP [facility]  [facility]   9/1/2020  8:00 AM Rehana Cintron DO [facility]  [facility]

## 2020-07-31 NOTE — TELEPHONE ENCOUNTER
Please let pt know that I or another provider needs to evaluate his knee for the best possible treatment options. I cannot place a referral or do an XRAY with seeing the pt first. He should ice the knee (never on bare skin) 15 minutes, then remove the ice for 15 minutes, then use a heating pad for 15 minutes three times a day. He can also use an NSAID for inflammation. Thank you.

## 2020-08-03 ENCOUNTER — HOSPITAL ENCOUNTER (OUTPATIENT)
Age: 55
Discharge: HOME | End: 2020-08-03
Payer: MEDICARE

## 2020-08-03 ENCOUNTER — HOSPITAL ENCOUNTER (OUTPATIENT)
Dept: GENERAL RADIOLOGY | Age: 55
Discharge: HOME | End: 2020-08-03
Payer: MEDICARE

## 2020-08-03 PROCEDURE — 73562 X-RAY EXAM OF KNEE 3: CPT

## 2020-08-04 ENCOUNTER — OFFICE VISIT (OUTPATIENT)
Dept: ORTHOPEDIC SURGERY | Age: 55
End: 2020-08-04
Payer: MEDICARE

## 2020-08-04 VITALS — WEIGHT: 261 LBS | BODY MASS INDEX: 39.56 KG/M2 | HEIGHT: 68 IN

## 2020-08-04 PROCEDURE — G8417 CALC BMI ABV UP PARAM F/U: HCPCS | Performed by: ORTHOPAEDIC SURGERY

## 2020-08-04 PROCEDURE — G8427 DOCREV CUR MEDS BY ELIG CLIN: HCPCS | Performed by: ORTHOPAEDIC SURGERY

## 2020-08-04 PROCEDURE — 99213 OFFICE O/P EST LOW 20 MIN: CPT | Performed by: ORTHOPAEDIC SURGERY

## 2020-08-04 PROCEDURE — L1812 KO ELASTIC W/JOINTS PRE OTS: HCPCS | Performed by: ORTHOPAEDIC SURGERY

## 2020-08-04 NOTE — PROGRESS NOTES
Chief Complaint   Patient presents with    New Patient     RIGHT KNEE P+/LEFT SCOPE/STEPED IN DITCH TWISTED KNEE/SWELLING, POPPING, CREPITIS, LIMITED ROM/24 HR/WEEK ON FEET       HISTORY OF PRESENT ILLNESS    Kelsey Shay is a 63 y.o. male. Presents for evaluation of his right knee. I am seeing him as a consultation at the request of CHEVY Vale who referred him to our practice for an orthopedic specialty evaluation. He states that over a week ago he stepped in a ditch and felt a pop in his knee when he twisted his knee. He has had significant pain and occasional popping in his knee since. He states that he had a meniscus tear in his left knee years ago and it was very similar and this feels the same to him. No prior right knee injuries. No prior surgery or injections on the right knee. He does use medical marijuana for pain relief as he does have significant history of some prior spine issues. Otherwise he tries to avoid medications if able. He has tried ice as well as compression and elevation.       PAST MEDICAL/SURGICAL HISTORY     Past Medical History:   Diagnosis Date    Bipolar disorder ([address]     CAD (coronary artery disease) 8/25/10    Cerebral artery occlusion with cerebral infarction ([address] 2011    L hand less fine motor movements    Chronic back pain     Controlled type 2 diabetes mellitus with diabetic polyneuropathy, without long-term current use of insulin [facility])     Controlled type 2 diabetes mellitus with diabetic polyneuropathy, without long-term current use of insulin ([address]     Depression 8/25/10    Displacement of cervical intervertebral disc without myelopathy     Gastroesophageal reflux 8/25/10    Hypercholesteremia     Hyperlipidemia 8/25/10    Hypertension     benign essential    Hypogonadism     Hypothyroidism     acquired    Morbid obesity with BMI of 40.0-44.9, adult ([address]     Morbid obesity with BMI of 40.0-44.9, adult ([address]     Osteoarthritis     Osteoarthritis of spine 8/25/10       Past Surgical History:   Procedure Laterality Date    CERVICAL FUSION      CHOLECYSTECTOMY      COLONOSCOPY N/A 6/12/2019    COLONOSCOPY POLYPECTOMY SNARE/COLD BIOPSY performed by Mendel Mile, MD at [address] Bilateral 8/13/2019    BILATERAL LUMBAR THREE TRANSFORAMINAL EPIDURAL STEROID INJECTION SITE CONFIRMED BY FLUOROSCOPY performed by Jeana Camarillo MD at [address]      LUMBAR SPINE SURGERY Bilateral 9/20/2019    BILATERAL L3 TRANSFORAMINAL EPIDURAL STEROID INJECTION WITH FLUOROSCOPY performed by Jeana Camarillo MD at [place] [address] History     Socioeconomic History    Marital status: Legally      Spouse name: Not on file    Number of children: Not on file    Years of education: Not on file    Highest education level: Not on file   Occupational History    Not on file   Social Needs    Financial resource strain: Not on file    Food insecurity     Worry: Not on file     Inability: Not on file    Transportation needs     Medical: Not on file     Non-medical: Not on file   Tobacco Use    Smoking status: Current Every Day Smoker     Packs/day: 1.00     Years: 30.00     Pack years: 30.00     Types: Cigarettes    Smokeless tobacco: Never Used    Tobacco comment: Is on Chantix   Substance and Sexual Activity    Alcohol use: No    Drug use: Yes     Types: Marijuana     Comment: Medical marijuana card     Sexual activity: Yes   Lifestyle    Physical activity     Days per week: Not on file     Minutes per session: Not on file    Stress: Not on file   Relationships    Social connections     Talks on phone: Not on file     Gets together: Not on file     Attends [employer] service: Not on file     Active member of club or organization: Not on file     Attends meetings of clubs or organizations: Not on file     Relationship status: Not on file    Intimate partner violence     Fear of current or ex partner: Not on file     Emotionally abused: Not on file     Physically abused: Not on file     Forced sexual activity: Not on file   Other Topics Concern    Not on file   Social History Narrative    Not on file       Family History   Problem Relation Age of Onset    Cancer Mother     Heart Disease Father     High Blood Pressure Father           REVIEW OF SYSTEMS  Pertinent items are noted in HPI  Review of systems reviewed from Patient History Form dated on 8/4/2020 and available in the patient's chart under the Media tab. PHYSICAL EXAM    Vitals:    08/04/20 0941   Weight: 261 lb (118.4 kg)   Height: 5' 7.99\" (1.727 m)       General Exam:   Constitutional: Patient is adequately groomed with no evidence of malnutrition  Mental Status: The patient is oriented to time, place and person. The patient's mood and affect are appropriate. Lymphatic: The lymphatic examination bilaterally reveals all areas to be without enlargement or induration. Neurological: The patient has good coordination. There is no weakness or sensory deficit. Antalgic gait:  Yes    Bilateral lower extremities are neurovascularly intact with symmetric light touch sensation and distal pulses. Left Knee Exam:  No skin lesions, erythema, or warmth. No joint effusion. No joint line tenderness. Negative Alistair. Ligaments stable. Quad tone good. ROM 0-130    Right Knee Exam:  No skin lesions, erythema, or warmth.   Effusion: 1+/4  Range Of Motion:  0-130    Hyperextension Pain:    positive  Hyperflexion Pain:     positive  Alistair:      positive  Medial Joint Line Tenderness:   positive  Lateral Joint Line Tenderness: Mild    Anterior Drawer:   negative  Posterior Drawer:   negative  Lachman:   negative  Pivot Shift:  not done  Valgus Stress:   negative  Varus Stress:   negative      REVIEW OF IMAGING  2 views PA 45 degree weightbearing and sunrise of the right knee dated 8/4/2020 demonstrate no acute fracture. No dislocation. Minimal degenerative changes with joint spaces well-maintained. Normal alignment. There is an ossified body visualized on the standing PA 45 degree weightbearing in the central aspect of the knee which does not appear acute in nature    [address]         8/3/2020 7:20 am         COMPARISON:    06/23/2008         HISTORY:    ORDERING SYSTEM PROVIDED HISTORY: Acute pain of right knee    TECHNOLOGIST PROVIDED HISTORY:    Reason for Exam: pt stepped into [facility] off of a zero-turn about a week    ago    Acuity: Acute    Type of Exam: Initial         69-year-old male with acute right knee pain         FINDINGS:    Large joint effusion.         Osseous alignment is normal.         No acute fracture or dislocation.  Joint spaces are well maintained.  No    suspicious osteolytic or osteoblastic lesions.              Impression    1. Large joint effusion.  Internal derangement not excluded. 2. No acute fracture or dislocation. ASSESSMENT  69-year-old male with concern for medial meniscus tear      PLAN  -Diagnosis and treatment options discussed in detail today  -Given his significant mechanical symptoms and sharp pain after an injury we will get MRI to evaluate for an acute medial meniscus tear  -Ice, activity modification, medications, compression  -After his MRI we will discuss via phone/virtual visit or in person and if there are issues in interim he will contact the office  -Thank you for the opportunity take part in the care of this patient    Enrique Valdes MD  [id number] [employer] partner of [facility])      Voice Recognition Dictation disclaimer: Please note that portions of this chart were generated using Dragon dictation software. Although every effort was made to ensure the accuracy of this automated transcription, some errors in transcription may have occurred.

## 2020-08-05 ENCOUNTER — HOSPITAL ENCOUNTER (OUTPATIENT)
Dept: MRI IMAGING | Age: 55
Discharge: HOME | End: 2020-08-05
Payer: MEDICARE

## 2020-08-05 PROCEDURE — 73721 MRI JNT OF LWR EXTRE W/O DYE: CPT

## 2020-08-10 ENCOUNTER — OFFICE VISIT (OUTPATIENT)
Dept: ORTHOPEDIC SURGERY | Age: 55
End: 2020-08-10
Payer: MEDICARE

## 2020-08-10 VITALS — WEIGHT: 261 LBS | BODY MASS INDEX: 39.56 KG/M2 | HEIGHT: 68 IN

## 2020-08-10 PROCEDURE — 3017F COLORECTAL CA SCREEN DOC REV: CPT | Performed by: ORTHOPAEDIC SURGERY

## 2020-08-10 PROCEDURE — G8427 DOCREV CUR MEDS BY ELIG CLIN: HCPCS | Performed by: ORTHOPAEDIC SURGERY

## 2020-08-10 PROCEDURE — 4004F PT TOBACCO SCREEN RCVD TLK: CPT | Performed by: ORTHOPAEDIC SURGERY

## 2020-08-10 PROCEDURE — 99213 OFFICE O/P EST LOW 20 MIN: CPT | Performed by: ORTHOPAEDIC SURGERY

## 2020-08-10 PROCEDURE — 20610 DRAIN/INJ JOINT/BURSA W/O US: CPT | Performed by: ORTHOPAEDIC SURGERY

## 2020-08-10 PROCEDURE — G8417 CALC BMI ABV UP PARAM F/U: HCPCS | Performed by: ORTHOPAEDIC SURGERY

## 2020-08-10 RX ORDER — LIDOCAINE HYDROCHLORIDE 10 MG/ML
5 INJECTION, SOLUTION INFILTRATION; PERINEURAL ONCE
Status: COMPLETED | OUTPATIENT
Start: 2020-08-10 | End: 2020-08-10

## 2020-08-10 RX ORDER — TRIAMCINOLONE ACETONIDE 40 MG/ML
40 INJECTION, SUSPENSION INTRA-ARTICULAR; INTRAMUSCULAR ONCE
Status: COMPLETED | OUTPATIENT
Start: 2020-08-10 | End: 2020-08-10

## 2020-08-10 RX ADMIN — LIDOCAINE HYDROCHLORIDE 5 ML: 10 INJECTION, SOLUTION INFILTRATION; PERINEURAL at 07:48

## 2020-08-10 RX ADMIN — TRIAMCINOLONE ACETONIDE 40 MG: 40 INJECTION, SUSPENSION INTRA-ARTICULAR; INTRAMUSCULAR at 07:48

## 2020-08-10 NOTE — PROGRESS NOTES
INJECTION WITH FLUOROSCOPY performed by Carissa Loaiza MD at [facility] History     Socioeconomic History    Marital status: Legally      Spouse name: Not on file    Number of children: Not on file    Years of education: Not on file    Highest education level: Not on file   Occupational History    Not on file   Social Needs    Financial resource strain: Not on file    Food insecurity     Worry: Not on file     Inability: Not on file    Transportation needs     Medical: Not on file     Non-medical: Not on file   Tobacco Use    Smoking status: Current Every Day Smoker     Packs/day: 1.00     Years: 30.00     Pack years: 30.00     Types: Cigarettes    Smokeless tobacco: Never Used    Tobacco comment: Is on Chantix   Substance and Sexual Activity    Alcohol use: No    Drug use: Yes     Types: Marijuana     Comment: Medical marijuana card     Sexual activity: Yes   Lifestyle    Physical activity     Days per week: Not on file     Minutes per session: Not on file    Stress: Not on file   Relationships    Social connections     Talks on phone: Not on file     Gets together: Not on file     Attends Protestant service: Not on file     Active member of club or organization: Not on file     Attends meetings of clubs or organizations: Not on file     Relationship status: Not on file    Intimate partner violence     Fear of current or ex partner: Not on file     Emotionally abused: Not on file     Physically abused: Not on file     Forced sexual activity: Not on file   Other Topics Concern    Not on file   Social History Narrative    Not on file       Family History   Problem Relation Age of Onset    Cancer Mother     Heart Disease Father     High Blood Pressure Father         Review of Systems  Pertinent items are noted in HPI  Review of systems reviewed from Patient History Form dated on 8/4/2020 and available in the patient's chart under the Media tab.      Vital Signs  There were no vitals filed for this visit. General Exam:   Constitutional: Patient is adequately groomed with no evidence of malnutrition  Mental Status: The patient is oriented to time, place and person. The patient's mood and affect are appropriate. Lymphatic: The lymphatic examination bilaterally reveals all areas to be without enlargement or induration. Neurological: The patient has good coordination. There is no weakness or sensory deficit. Gait: Antalgic    Right knee examination  Inspection: Trace effusion, no erythema    Palpation: Tenderness to the medial joint line, mild lateral tenderness, positive pain with patellar compression through range of motion    Range of Motion: 0-130    Sensation: In tact to light touch all nerve distributions     Strength: Knee flexion and extension motor intact with moderate quad tone normal distal motor exam    Special Tests: Stable varus and valgus, stable anterior posterior drawer, stable Lachman    Skin: There are no additional worrisome rashes, ulcerations or lesions. Circulation normal    Additional Examinations:  Left Upper Extremity: Examination of the left upper extremity does not show any deformity or injury with some mild tenderness to the anterior medial aspect of the knee. Range of motion is unremarkable. There is no gross instability. There are no rashes, ulcerations or lesions.   Strength and tone are normal.      Radiology:     MRI OF THE RIGHT KNEE WITHOUT CONTRAST, 8/5/2020 2:55 pm         TECHNIQUE:    Multiplanar multisequence MRI of the right knee was performed without the    administration of intravenous contrast.         COMPARISON:    None.         HISTORY:    ORDERING SYSTEM PROVIDED HISTORY: Right knee pain, unspecified chronicity    TECHNOLOGIST PROVIDED HISTORY:    1.) [phone] call in 2 hours select option #2 and then Option #1 and left     know information is in [address] [place] [address] Rd and yo are scheduling a MRI of the    right knee without contrast.         2.) Then call [phone] after scheduling MRI  to schedule follow up    appointment based on MRI appointment  so that Dr. Kingston Duvall review results of MRI    with you    Reason for exam:->Right knee: R/O Mensicus injury    Reason for Exam: Pain, stiff and popping for 2 weeks.  Injury    Acuity: Acute    Type of Exam: Ongoing         FINDINGS:    MENISCI: Medial and lateral menisci are normal in bulk, contour, and signal    intensity, without discrete tear identified.         CRUCIATE LIGAMENTS: Anterior and posterior cruciate ligaments are intact.         EXTENSOR MECHANISM: Quadriceps and patellar tendons are intact. Medial/lateral patellofemoral retinacula are intact.         LATERAL COLLATERAL LIGAMENT COMPLEX: The popliteus tendon, biceps femoris    tendon, fibular collateral ligament and iliotibial band are intact.         MEDIAL COLLATERAL LIGAMENT COMPLEX: The superficial and deep components of    the medial collateral ligament are intact.         KNEE JOINT: Multifocal partial thickness chondromalacia within the medial and    patellofemoral compartments.  No full-thickness chondromalacia identified. No joint effusion.         BONE MARROW: No discrete marrow signal abnormality.              Impression    No meniscal, cruciate, or collateral ligamentous tear.         Multifocal partial thickness chondromalacia.             Assessment:  58-year-old male with mild osteoarthritis of the right knee, acute strain    Office Procedures:  Orders Placed This Encounter   Procedures    AR ARTHROCENTESIS ASPIR&/INJ MAJOR JT/BURSA W/O US       Plan:   -At this time we will proceed with a corticosteroid injection in his knee to help with acute pain relief  -He may continue with ice, NSAIDs, activity modification, brace  -We will see him back in 6 weeks did not have significant symptoms for repeat evaluation if there are issues in interim he will contact the office      Right Knee corticosteroid injection    After informed consent was provided including a discussion of risks such as infection, alternative treatments, and benefits verbal consent was obtained. The patient was seated on the exam table with the Right knee(s) flexed to 90 degrees. The anterolateral aspect of the knee adjacent to the joint line was prepped with Betadine and alcohol. A 22-gauge needle was inserted into the  Right knee and a mixture of 5 mL of 1% lidocaine and 2 ml of Kenalog was injected. The needle was withdrawn and the puncture site was cleaned with alcohol and sealed with a Band-Aid. The patient tolerated the procedure well. Ramón Donohue MD  [address] partner of [facility])        Voice Recognition Dictation disclaimer: Please note that portions of this chart were generated using Dragon dictation software. Although every effort was made to ensure the accuracy of this automated transcription, some errors in transcription may have occurred.

## 2020-08-27 ENCOUNTER — OFFICE VISIT (OUTPATIENT)
Dept: ORTHOPEDIC SURGERY | Age: 55
End: 2020-08-27
Payer: MEDICARE

## 2020-08-27 VITALS — BODY MASS INDEX: 39.56 KG/M2 | HEIGHT: 68 IN | WEIGHT: 261 LBS

## 2020-08-27 PROCEDURE — G8427 DOCREV CUR MEDS BY ELIG CLIN: HCPCS | Performed by: ORTHOPAEDIC SURGERY

## 2020-08-27 PROCEDURE — 99213 OFFICE O/P EST LOW 20 MIN: CPT | Performed by: ORTHOPAEDIC SURGERY

## 2020-08-27 PROCEDURE — 3017F COLORECTAL CA SCREEN DOC REV: CPT | Performed by: ORTHOPAEDIC SURGERY

## 2020-08-27 PROCEDURE — 20610 DRAIN/INJ JOINT/BURSA W/O US: CPT | Performed by: ORTHOPAEDIC SURGERY

## 2020-08-27 PROCEDURE — 4004F PT TOBACCO SCREEN RCVD TLK: CPT | Performed by: ORTHOPAEDIC SURGERY

## 2020-08-27 PROCEDURE — G8417 CALC BMI ABV UP PARAM F/U: HCPCS | Performed by: ORTHOPAEDIC SURGERY

## 2020-08-27 RX ORDER — HYALURONATE SODIUM 10 MG/ML
20 SYRINGE (ML) INTRAARTICULAR ONCE
Status: COMPLETED | OUTPATIENT
Start: 2020-08-27 | End: 2020-08-27

## 2020-08-27 RX ADMIN — Medication 20 MG: at 08:44

## 2020-08-27 NOTE — PROGRESS NOTES
Chief Complaint  Follow-up (RIGHT KNEE: OA STRAIN, LAST SEEN INJECTION 8/10, INJECTION HELPED FOR 1 WEEK AND PAIN WAS BACK, INCREASE PAIN WITH GOING FROM SIT TO STAND AND WITH TWISTING MOTION )      History of Present Illness:  Tay Foy is a 63 y.o. y/o male who presents today for follow up of his right knee as well as evaluation of his left knee. He states he had relief after his right knee corticosteroid injection 2 weeks ago for about a week and his pain has returned somewhat. He is also been having more left knee pain. He had surgery previously in the left knee by another provider and also had corticosteroid and Euflexxa injections. No recent injections in the left knee. No numbness or tingling. He occasionally takes over-the-counter medications.     Medical History  Past Medical History:   Diagnosis Date    Bipolar disorder ([address]     CAD (coronary artery disease) 8/25/10    Cerebral artery occlusion with cerebral infarction ([address] 2011    L hand less fine motor movements    Chronic back pain     Controlled type 2 diabetes mellitus with diabetic polyneuropathy, without long-term current use of insulin ([facility])     Controlled type 2 diabetes mellitus with diabetic polyneuropathy, without long-term current use of insulin ([address]     Depression 8/25/10    Displacement of cervical intervertebral disc without myelopathy     Gastroesophageal reflux 8/25/10    Hypercholesteremia     Hyperlipidemia 8/25/10    Hypertension     benign essential    Hypogonadism     Hypothyroidism     acquired    Morbid obesity with BMI of 40.0-44.9, adult ([address]     Morbid obesity with BMI of 40.0-44.9, adult ([address]     Osteoarthritis     Osteoarthritis of spine 8/25/10       Past Surgical History:   Procedure Laterality Date    CERVICAL FUSION      CHOLECYSTECTOMY      COLONOSCOPY N/A 6/12/2019    COLONOSCOPY POLYPECTOMY SNARE/COLD BIOPSY performed by Irma Mora MD at [facility] INJECTION Bilateral 8/13/2019    BILATERAL LUMBAR THREE TRANSFORAMINAL EPIDURAL STEROID INJECTION SITE CONFIRMED BY FLUOROSCOPY performed by Rylan Valerio MD at [address] [place] [address]      LUMBAR SPINE SURGERY Bilateral 9/20/2019    BILATERAL L3 TRANSFORAMINAL EPIDURAL STEROID INJECTION WITH FLUOROSCOPY performed by Rylan Valerio MD at [facility] History     Socioeconomic History    Marital status: Legally      Spouse name: Not on file    Number of children: Not on file    Years of education: Not on file    Highest education level: Not on file   Occupational History    Not on file   Social Needs    Financial resource strain: Not on file    Food insecurity     Worry: Not on file     Inability: Not on file    Transportation needs     Medical: Not on file     Non-medical: Not on file   Tobacco Use    Smoking status: Current Every Day Smoker     Packs/day: 1.00     Years: 30.00     Pack years: 30.00     Types: Cigarettes    Smokeless tobacco: Never Used    Tobacco comment: Is on Chantix   Substance and Sexual Activity    Alcohol use: No    Drug use: Yes     Types: Marijuana     Comment: Medical marijuana card     Sexual activity: Yes   Lifestyle    Physical activity     Days per week: Not on file     Minutes per session: Not on file    Stress: Not on file   Relationships    Social connections     Talks on phone: Not on file     Gets together: Not on file     Attends [employer] service: Not on file     Active member of club or organization: Not on file     Attends meetings of clubs or organizations: Not on file     Relationship status: Not on file    Intimate partner violence     Fear of current or ex partner: Not on file     Emotionally abused: Not on file     Physically abused: Not on file     Forced sexual activity: Not on file   Other Topics Concern    Not on file   Social History Narrative    Not on file       Family History   Problem Relation Age of Onset    Cancer Mother     Heart Disease Father     High Blood Pressure Father         Review of Systems  Pertinent items are noted in HPI  Review of systems reviewed from Patient History Form dated on 8/4/2020 and available in the patient's chart under the Media tab. Vital Signs  There were no vitals filed for this visit. General Exam:   Constitutional: Patient is adequately groomed with no evidence of malnutrition  Mental Status: The patient is oriented to time, place and person. The patient's mood and affect are appropriate. Lymphatic: The lymphatic examination bilaterally reveals all areas to be without enlargement or induration. Neurological: The patient has good coordination. There is no weakness or sensory deficit. Gait: Antalgic    Right knee examination  Inspection: No effusion, no erythema    Palpation: Medial joint line tenderness, mild lateral tenderness, positive pain with patellar compression    Range of Motion: 0-130    Sensation: In tact to light touch all nerve distributions     Strength: Knee flexion extension motor intact with moderate quad tone normal distal motor exam    Special Tests: Stable varus and valgus, stable anterior posterior drawer    Skin: There are no additional worrisome rashes, ulcerations or lesions. Circulation normal    Left knee examination  Inspection: No effusion, no erythema    Palpation: Medial joint line tenderness, mild lateral tenderness, positive pain with patellar compression    Range of Motion: 0-130    Sensation: In tact to light touch all nerve distributions     Strength: Knee flexion extension motor intact with moderate quad tone normal distal motor exam    Special Tests: Stable varus and valgus, stable anterior posterior drawer    Skin: There are no additional worrisome rashes, ulcerations or lesions.     Circulation normal    Radiology:     X-rays obtained and reviewed in office:  AP, PA 45 degree weightbearing, lateral, sunrise 4 views left knee obtained 8/27/2020 demonstrate mild to moderate osteoarthritis with mild joint space narrowing. No visible loose bodies or bony lesions. No acute fracture dislocation. Assessment:  58-year-old male with mild to moderate osteoarthritis of right and left knee    Office Procedures:  Orders Placed This Encounter   Procedures    XR KNEE LEFT (MIN 4 VIEWS)     Standing Status:   Future     Number of Occurrences:   1     Standing Expiration Date:   8/27/2021     Order Specific Question:   Reason for exam:     Answer:   PAIN    [place] ARTHROCENTESIS ASPIR&/INJ MAJOR JT/BURSA W/O US       Plan:   -Discussed for his right knee today we will continue with ice, Q modification, over-the-counter medications, moderate exercise, healthy lifestyle measures  -We discussed proceeding with Visco supplementation injections in the left knee to see if these give him some relief as they have in the past and we will proceed today. We discussed that if these do give him some good relief we can consider them for the right knee in the future as well  -We will see him back in 1 week for his second injection of the left knee with Euflexxa and if there are issues in interim he will contact the office    Left Knee Euflexxa Injection    The patient returns today for their first Euflexxa injection to the left knee(s) for diagnosis of osteoarthritis. The risks, benefits, and complications of the injections were again discussed in detail with the patient. The risks discussed included but are not limited to infection, skin reactions, hot swollen joints, and anaphylaxis. The patient gave verbal informed consent for the injection. The patient skin was prepped with iodine and the the left knee joint(s) was injected with 2 ml of Euflexxa intra-articularly under sterile conditions through the anterolateral portal position. The patient tolerated the injection reasonably well.  The patient was given instructions to ice the

## 2020-09-04 ENCOUNTER — OFFICE VISIT (OUTPATIENT)
Dept: ORTHOPEDIC SURGERY | Age: 55
End: 2020-09-04
Payer: MEDICARE

## 2020-09-04 VITALS — BODY MASS INDEX: 39.56 KG/M2 | WEIGHT: 261 LBS | HEIGHT: 68 IN

## 2020-09-04 PROCEDURE — G8427 DOCREV CUR MEDS BY ELIG CLIN: HCPCS | Performed by: ORTHOPAEDIC SURGERY

## 2020-09-04 PROCEDURE — 4004F PT TOBACCO SCREEN RCVD TLK: CPT | Performed by: ORTHOPAEDIC SURGERY

## 2020-09-04 PROCEDURE — 3017F COLORECTAL CA SCREEN DOC REV: CPT | Performed by: ORTHOPAEDIC SURGERY

## 2020-09-04 PROCEDURE — 20610 DRAIN/INJ JOINT/BURSA W/O US: CPT | Performed by: ORTHOPAEDIC SURGERY

## 2020-09-04 PROCEDURE — 99213 OFFICE O/P EST LOW 20 MIN: CPT | Performed by: ORTHOPAEDIC SURGERY

## 2020-09-04 PROCEDURE — G8417 CALC BMI ABV UP PARAM F/U: HCPCS | Performed by: ORTHOPAEDIC SURGERY

## 2020-09-04 RX ORDER — HYALURONATE SODIUM 10 MG/ML
20 SYRINGE (ML) INTRAARTICULAR ONCE
Status: COMPLETED | OUTPATIENT
Start: 2020-09-04 | End: 2020-09-04

## 2020-09-04 RX ADMIN — Medication 20 MG: at 11:14

## 2020-09-04 NOTE — PROGRESS NOTES
Chief Complaint  Follow-up (left knee: #2 Euflexxa injection and Right Knee: #1 euflexxa injection )      History of Present Illness:  Suzie Owens is a 63 y.o. y/o male who presents today for follow up of his right and left knees. We began with left knee Euflexxa injections last week. He does think he wants to move forward with Euflexxa injections for his right knee today as well. No new falls or acute trauma.       Medical History  Past Medical History:   Diagnosis Date    Bipolar disorder ([address]     CAD (coronary artery disease) 8/25/10    Cerebral artery occlusion with cerebral infarction ([address] 2011    L hand less fine motor movements    Chronic back pain     Controlled type 2 diabetes mellitus with diabetic polyneuropathy, without long-term current use of insulin ([facility])     Controlled type 2 diabetes mellitus with diabetic polyneuropathy, without long-term current use of insulin ([address]     Depression 8/25/10    Displacement of cervical intervertebral disc without myelopathy     Gastroesophageal reflux 8/25/10    Hypercholesteremia     Hyperlipidemia 8/25/10    Hypertension     benign essential    Hypogonadism     Hypothyroidism     acquired    Morbid obesity with BMI of 40.0-44.9, adult ([address]     Morbid obesity with BMI of 40.0-44.9, adult ([address]     Osteoarthritis     Osteoarthritis of spine 8/25/10       Past Surgical History:   Procedure Laterality Date    CERVICAL FUSION      CHOLECYSTECTOMY      COLONOSCOPY N/A 6/12/2019    COLONOSCOPY POLYPECTOMY SNARE/COLD BIOPSY performed by Velma Swartz MD at [facility] Bilateral 8/13/2019    BILATERAL LUMBAR THREE TRANSFORAMINAL EPIDURAL STEROID INJECTION SITE CONFIRMED BY FLUOROSCOPY performed by Jarod Ortega MD at [address]      LUMBAR SPINE SURGERY Bilateral 9/20/2019    BILATERAL L3 TRANSFORAMINAL EPIDURAL STEROID INJECTION WITH FLUOROSCOPY performed by Alfonso Godinez MD at [facility] History     Socioeconomic History    Marital status: Legally      Spouse name: Not on file    Number of children: Not on file    Years of education: Not on file    Highest education level: Not on file   Occupational History    Not on file   Social Needs    Financial resource strain: Not on file    Food insecurity     Worry: Not on file     Inability: Not on file    Transportation needs     Medical: Not on file     Non-medical: Not on file   Tobacco Use    Smoking status: Current Every Day Smoker     Packs/day: 1.00     Years: 30.00     Pack years: 30.00     Types: Cigarettes    Smokeless tobacco: Never Used    Tobacco comment: Is on Chantix   Substance and Sexual Activity    Alcohol use: No    Drug use: Yes     Types: Marijuana     Comment: Medical marijuana card     Sexual activity: Yes   Lifestyle    Physical activity     Days per week: Not on file     Minutes per session: Not on file    Stress: Not on file   Relationships    Social connections     Talks on phone: Not on file     Gets together: Not on file     Attends Congregation service: Not on file     Active member of club or organization: Not on file     Attends meetings of clubs or organizations: Not on file     Relationship status: Not on file    Intimate partner violence     Fear of current or ex partner: Not on file     Emotionally abused: Not on file     Physically abused: Not on file     Forced sexual activity: Not on file   Other Topics Concern    Not on file   Social History Narrative    Not on file       Family History   Problem Relation Age of Onset    Cancer Mother     Heart Disease Father     High Blood Pressure Father         Review of Systems  Pertinent items are noted in HPI  Review of systems reviewed from Patient History Form dated on 8/4/and available in the patient's chart under the Media tab. Vital Signs  There were no vitals filed for this visit.     General Exam: Constitutional: Patient is adequately groomed with no evidence of malnutrition  Mental Status: The patient is oriented to time, place and person. The patient's mood and affect are appropriate. Lymphatic: The lymphatic examination bilaterally reveals all areas to be without enlargement or induration. Neurological: The patient has good coordination. There is no weakness or sensory deficit.      Gait: Mildly antalgic    Right knee examination  Inspection: No effusion, no erythema     Palpation: Medial joint line tenderness, mild lateral tenderness, positive pain with patellar compression     Range of Motion: 0-130     Sensation: In tact to light touch all nerve distributions      Strength: Knee flexion extension motor intact with moderate quad tone normal distal motor exam     Special Tests: Stable varus and valgus, stable anterior posterior drawer     Skin: There are no additional worrisome rashes, ulcerations or lesions.     Circulation normal     Left knee examination  Inspection: No effusion, no erythema     Palpation: Medial joint line tenderness, mild lateral tenderness, positive pain with patellar compression     Range of Motion: 0-130     Sensation: In tact to light touch all nerve distributions      Strength: Knee flexion extension motor intact with moderate quad tone normal distal motor exam     Special Tests: Stable varus and valgus, stable anterior posterior drawer     Skin: There are no additional worrisome rashes, ulcerations or lesions.     Circulation normal      Radiology:     X-rays obtained and reviewed in office:  No new imaging      Assessment:  41-year-old male with right and left knee mild to moderate osteoarthritis    Office Procedures:  Orders Placed This Encounter   Procedures    [place] ARTHROCENTESIS ASPIR&/INJ MAJOR JT/BURSA W/O US    EUFLEXXA INJ PER DOSE     Right knee- Euflexxa inj     Standing Status:   Future     Standing Expiration Date:   9/4/2021    AK ARTHROCENTESIS ASPIR&/INJ MAJOR JT/BURSA W/O US       Plan:   -Today we will proceed with Euflexxa injections once again in his right and left knees which would be the second injection for the left knee and first for the right  -Ice, activity modification over-the-counter medications  -We will see him back in 1 week for his next set of injections -third in the left and second in the right - and if there are issues in interim he will contact the office    Right and left knee Euflexxa Injection    The patient returns today for their first Euflexxa injection to the right knee(s) for diagnosis of osteoarthritis. The risks, benefits, and complications of the injections were again discussed in detail with the patient. The risks discussed included but are not limited to infection, skin reactions, hot swollen joints, and anaphylaxis. The patient gave verbal informed consent for the injection. The patient skin was prepped with iodine and the the right knee joint(s) was injected with 2 ml of Euflexxa intra-articularly under sterile conditions through the anterolateral portal position. The patient returns today for their second Euflexxa injection to the left knee(s) for diagnosis of osteoarthritis. The risks, benefits, and complications of the injections were again discussed in detail with the patient. The risks discussed included but are not limited to infection, skin reactions, hot swollen joints, and anaphylaxis. The patient gave verbal informed consent for the injection. The patient skin was prepped with iodine and the the left knee joint(s) was injected with 2 ml of Euflexxa intra-articularly under sterile conditions through the anterolateral portal position. The patient tolerated the injection reasonably well. The patient was given instructions to ice the right and left knee(s) and avoid strenuous activities for 24-48 hours.  The patient was instructed to call the office immediately if there is increased pain, redness, warmth, fever, or chills. We will see the patient back in 1 week. Ramón Donohue MD  [address] [facility] partner of [facility] ([facility])      Voice Recognition Dictation disclaimer: Please note that portions of this chart were generated using Dragon dictation software. Although every effort was made to ensure the accuracy of this automated transcription, some errors in transcription may have occurred.

## 2020-09-08 ENCOUNTER — OFFICE VISIT (OUTPATIENT)
Dept: FAMILY MEDICINE CLINIC | Age: 55
End: 2020-09-08
Payer: MEDICARE

## 2020-09-08 ENCOUNTER — HOSPITAL ENCOUNTER (OUTPATIENT)
Dept: GENERAL RADIOLOGY | Age: 55
Discharge: HOME | End: 2020-09-08
Payer: MEDICARE

## 2020-09-08 VITALS
TEMPERATURE: 99 F | RESPIRATION RATE: 16 BRPM | WEIGHT: 283 LBS | OXYGEN SATURATION: 96 % | HEART RATE: 76 BPM | SYSTOLIC BLOOD PRESSURE: 138 MMHG | HEIGHT: 67 IN | BODY MASS INDEX: 44.42 KG/M2 | DIASTOLIC BLOOD PRESSURE: 76 MMHG

## 2020-09-08 PROCEDURE — 71046 X-RAY EXAM CHEST 2 VIEWS: CPT

## 2020-09-08 PROCEDURE — 3017F COLORECTAL CA SCREEN DOC REV: CPT | Performed by: FAMILY MEDICINE

## 2020-09-08 PROCEDURE — 3044F HG A1C LEVEL LT 7.0%: CPT | Performed by: FAMILY MEDICINE

## 2020-09-08 PROCEDURE — G0439 PPPS, SUBSEQ VISIT: HCPCS | Performed by: FAMILY MEDICINE

## 2020-09-08 RX ORDER — PREGABALIN 150 MG/1
CAPSULE ORAL
Qty: 60 CAPSULE | Refills: 0 | Status: SHIPPED | OUTPATIENT
Start: 2020-09-08 | End: 2020-12-15 | Stop reason: SDUPTHER

## 2020-09-08 RX ORDER — LISINOPRIL 20 MG/1
20 TABLET ORAL DAILY
Qty: 90 TABLET | Refills: 1 | Status: SHIPPED | OUTPATIENT
Start: 2020-09-08 | End: 2021-02-09 | Stop reason: DRUGHIGH

## 2020-09-08 ASSESSMENT — PATIENT HEALTH QUESTIONNAIRE - PHQ9
SUM OF ALL RESPONSES TO PHQ QUESTIONS 1-9: 1
SUM OF ALL RESPONSES TO PHQ QUESTIONS 1-9: 1

## 2020-09-08 NOTE — PROGRESS NOTES
any trouble with your hearing?: No  Do you have difficulty driving, watching TV, or doing any of your daily activities because of your eyesight?: No  Have you had an eye exam within the past year?: (!) No  Hearing/Vision Interventions:  · Encouraged annual eye exam    Safety:  Safety  Do you have working smoke detectors?: Yes  Have all throw rugs been removed or fastened?: Yes  Do you have non-slip mats or surfaces in all bathtubs/showers?: Yes  Do all of your stairways have a railing or banister?: Yes  Are your doorways, halls and stairs free of clutter?: Yes  Do you always fasten your seatbelt when you are in a car?: (!) No(and motorcycle)  Safety Interventions:  · Encourage patient to use his seatbelt, states that his car's seatbelts are broken.      Personalized Preventive Plan   Current Health Maintenance Status  Immunization History   Administered Date(s) Administered    Influenza Whole 10/28/2010    Influenza, Quadv, IM, PF (6 mo and older Fluzone, Flulaval, Fluarix, and 3 yrs and older Afluria) 09/27/2019    Pneumococcal Polysaccharide ([id number]) 05/24/2019    Tdap (Boostrix, Adacel) 05/28/2019        Health Maintenance   Topic Date Due    Diabetic retinal exam  03/20/1974    Low dose CT lung screening  03/20/2019    Annual Wellness Visit (AWV)  05/29/2019    Flu vaccine (1) 09/01/2020    Hepatitis B vaccine (1 of 3 - Risk 3-dose series) 01/17/2021 (Originally [birth year]20/1984)    Shingles Vaccine (1 of 2) 01/17/2021 (Originally 3/20/2014)    Hepatitis C screen  01/17/2021 (Originally 1965)    HIV screen  01/17/2021 (Originally 3/20/1979)    Diabetic foot exam  09/27/2020    Diabetic microalbuminuria test  09/27/2020    Lipid screen  09/27/2020    TSH testing  09/27/2020    Potassium monitoring  09/27/2020    Creatinine monitoring  09/27/2020    A1C test (Diabetic or Prediabetic)  01/17/2021    Colon cancer screen colonoscopy  06/12/2024    DTaP/Tdap/Td vaccine (2 - Td) 05/28/2029    Pneumococcal 0-64 years Vaccine  Completed    Hepatitis A vaccine  Aged Out    Hib vaccine  Aged Out    Meningococcal (ACWY) vaccine  Aged Out     Recommendations for [facility] Due: see orders and patient instructions/AVS.  . Recommended screening schedule for the next 5-10 years is provided to the patient in written form: see Patient Donaldo Madden was seen today for medicare awv and other. Diagnoses and all orders for this visit:    Routine general medical examination at a health care facility    Diabetic polyneuropathy associated with type 2 diabetes mellitus (HCC)  -     pregabalin (LYRICA) 150 MG capsule; TAKE 1 CAPSULE BY MOUTH TWICE DAILY    Chest wall pain  -     XR CHEST STANDARD (2 VW); Future    Recurrent major depressive disorder, in full remission ([facility]    Skin lesion  -     External Referral To Dermatology    Other orders  -     lisinopril (PRINIVIL;ZESTRIL) 20 MG tablet; Take 1 tablet by mouth daily  -     diclofenac sodium (VOLTAREN) 1 % GEL;  Apply 4 g topically 4 times daily

## 2020-09-08 NOTE — PATIENT INSTRUCTIONS
Personalized Preventive Plan for Elinor Conner. - 9/8/2020  Medicare offers a range of preventive health benefits. Some of the tests and screenings are paid in full while other may be subject to a deductible, co-insurance, and/or copay. Some of these benefits include a comprehensive review of your medical history including lifestyle, illnesses that may run in your family, and various assessments and screenings as appropriate. After reviewing your medical record and screening and assessments performed today your provider may have ordered immunizations, labs, imaging, and/or referrals for you. A list of these orders (if applicable) as well as your Preventive Care list are included within your After Visit Summary for your review. Other Preventive Recommendations:    · A preventive eye exam performed by an eye specialist is recommended every 1-2 years to screen for glaucoma; cataracts, macular degeneration, and other eye disorders. · A preventive dental visit is recommended every 6 months. · Try to get at least 150 minutes of exercise per week or 10,000 steps per day on a pedometer . · Order or download the FREE \"Exercise & Physical Activity: Your Everyday Guide\" from [employer]. Call 3-566.312.2426 or search [employer] online. · You need 5209-3498 mg of calcium and 5162-5930 IU of vitamin D per day. It is possible to meet your calcium requirement with diet alone, but a vitamin D supplement is usually necessary to meet this goal.  · When exposed to the sun, use a sunscreen that protects against both UVA and UVB radiation with an SPF of 30 or greater. Reapply every 2 to 3 hours or after sweating, drying off with a towel, or swimming. · Always wear a seat belt when traveling in a car. Always wear a helmet when riding a bicycle or motorcycle.

## 2020-09-09 ENCOUNTER — TELEPHONE (OUTPATIENT)
Dept: FAMILY MEDICINE CLINIC | Age: 55
End: 2020-09-09

## 2020-09-11 ENCOUNTER — TELEPHONE (OUTPATIENT)
Dept: ORTHOPEDIC SURGERY | Age: 55
End: 2020-09-11

## 2020-09-11 NOTE — TELEPHONE ENCOUNTER
09/11/2020  EUFLEXXA  (SERIES OF 3)   RIGHT  KNEE. NO AUTHORIZATION REQUIRED. CAN BUY& BILL. PER MEDICARE GUIDELINES.  AP

## 2020-09-14 ENCOUNTER — NURSE ONLY (OUTPATIENT)
Dept: FAMILY MEDICINE CLINIC | Age: 55
End: 2020-09-14

## 2020-09-14 VITALS — TEMPERATURE: 97.7 F

## 2020-09-14 LAB
A/G RATIO: 1.2 (ref 1.1–2.2)
ALBUMIN SERPL-MCNC: 4 G/DL (ref 3.4–5)
ALP BLD-CCNC: 39 U/L (ref 40–129)
ALT SERPL-CCNC: 21 U/L (ref 10–40)
ANION GAP SERPL CALCULATED.3IONS-SCNC: 9 MMOL/L (ref 3–16)
AST SERPL-CCNC: 20 U/L (ref 15–37)
BASOPHILS ABSOLUTE: 0.1 K/UL (ref 0–0.2)
BASOPHILS RELATIVE PERCENT: 1.4 %
BILIRUB SERPL-MCNC: 0.4 MG/DL (ref 0–1)
BUN BLDV-MCNC: 13 MG/DL (ref 7–20)
CALCIUM SERPL-MCNC: 9 MG/DL (ref 8.3–10.6)
CHLORIDE BLD-SCNC: 100 MMOL/L (ref 99–110)
CHOLESTEROL, TOTAL: 222 MG/DL (ref 0–199)
CO2: 25 MMOL/L (ref 21–32)
CREAT SERPL-MCNC: 1 MG/DL (ref 0.9–1.3)
EOSINOPHILS ABSOLUTE: 0.1 K/UL (ref 0–0.6)
EOSINOPHILS RELATIVE PERCENT: 2.6 %
GFR AFRICAN AMERICAN: >60
GFR NON-AFRICAN AMERICAN: >60
GLOBULIN: 3.3 G/DL
GLUCOSE BLD-MCNC: 121 MG/DL (ref 70–99)
HCT VFR BLD CALC: 40.4 % (ref 40.5–52.5)
HDLC SERPL-MCNC: 41 MG/DL (ref 40–60)
HEMOGLOBIN: 13.6 G/DL (ref 13.5–17.5)
LDL CHOLESTEROL CALCULATED: 160 MG/DL
LYMPHOCYTES ABSOLUTE: 2 K/UL (ref 1–5.1)
LYMPHOCYTES RELATIVE PERCENT: 38.5 %
MCH RBC QN AUTO: 30.9 PG (ref 26–34)
MCHC RBC AUTO-ENTMCNC: 33.7 G/DL (ref 31–36)
MCV RBC AUTO: 91.7 FL (ref 80–100)
MONOCYTES ABSOLUTE: 0.4 K/UL (ref 0–1.3)
MONOCYTES RELATIVE PERCENT: 6.9 %
NEUTROPHILS ABSOLUTE: 2.6 K/UL (ref 1.7–7.7)
NEUTROPHILS RELATIVE PERCENT: 50.6 %
PDW BLD-RTO: 13.1 % (ref 12.4–15.4)
PLATELET # BLD: 264 K/UL (ref 135–450)
PMV BLD AUTO: 8 FL (ref 5–10.5)
POTASSIUM SERPL-SCNC: 4.6 MMOL/L (ref 3.5–5.1)
PROSTATE SPECIFIC ANTIGEN: 3.42 NG/ML (ref 0–4)
RBC # BLD: 4.41 M/UL (ref 4.2–5.9)
SODIUM BLD-SCNC: 134 MMOL/L (ref 136–145)
T4 FREE: 0.5 NG/DL (ref 0.9–1.8)
TOTAL PROTEIN: 7.3 G/DL (ref 6.4–8.2)
TRIGL SERPL-MCNC: 103 MG/DL (ref 0–150)
TSH SERPL DL<=0.05 MIU/L-ACNC: 24.95 UIU/ML (ref 0.27–4.2)
VLDLC SERPL CALC-MCNC: 21 MG/DL
WBC # BLD: 5.2 K/UL (ref 4–11)

## 2020-09-15 ENCOUNTER — OFFICE VISIT (OUTPATIENT)
Dept: ORTHOPEDIC SURGERY | Age: 55
End: 2020-09-15
Payer: MEDICARE

## 2020-09-15 VITALS — HEIGHT: 67 IN | BODY MASS INDEX: 44.42 KG/M2 | WEIGHT: 283 LBS

## 2020-09-15 LAB
ESTIMATED AVERAGE GLUCOSE: 139.9 MG/DL
HBA1C MFR BLD: 6.5 %

## 2020-09-15 PROCEDURE — 20610 DRAIN/INJ JOINT/BURSA W/O US: CPT | Performed by: ORTHOPAEDIC SURGERY

## 2020-09-15 RX ORDER — HYALURONATE SODIUM 10 MG/ML
40 SYRINGE (ML) INTRAARTICULAR ONCE
Status: COMPLETED | OUTPATIENT
Start: 2020-09-15 | End: 2020-09-15

## 2020-09-15 RX ADMIN — Medication 40 MG: at 11:01

## 2020-09-15 NOTE — PROGRESS NOTES
Right and Left Knee Euflexxa Injection    The patient returns today for their second Euflexxa injection to the right knee(s) for diagnosis of osteoarthritis. The risks, benefits, and complications of the injections were again discussed in detail with the patient. The risks discussed included but are not limited to infection, skin reactions, hot swollen joints, and anaphylaxis. The patient gave verbal informed consent for the injection. The patient skin was prepped with iodine and the the right knee joint(s) was injected with 2 ml of Euflexxa intra-articularly under sterile conditions through the anterolateral portal position. The patient returns today for their third Euflexxa injection to the left knee(s) for diagnosis of osteoarthritis. The risks, benefits, and complications of the injections were again discussed in detail with the patient. The risks discussed included but are not limited to infection, skin reactions, hot swollen joints, and anaphylaxis. The patient gave verbal informed consent for the injection. The patient skin was prepped with iodine and the the left knee joint(s) was injected with 2 ml of Euflexxa intra-articularly under sterile conditions through the anterolateral portal position. The patient tolerated the injection reasonably well. The patient was given instructions to ice the right and left knee(s) and avoid strenuous activities for 24-48 hours. The patient was instructed to call the office immediately if there is increased pain, redness, warmth, fever, or chills. We will see the patient back in 1 week. MD [address] partner of [facility])        Procedures    [place] ARTHROCENTESIS ASPIR&/INJ MAJOR JT/BURSA W/O US

## 2020-09-16 LAB
SEX HORMONE BINDING GLOBULIN: 16 NMOL/L (ref 11–80)
TESTOSTERONE FREE-NONMALE: 59.7 PG/ML (ref 47–244)
TESTOSTERONE TOTAL: 219 NG/DL (ref 220–1000)

## 2020-09-22 ENCOUNTER — OFFICE VISIT (OUTPATIENT)
Dept: ORTHOPEDIC SURGERY | Age: 55
End: 2020-09-22
Payer: MEDICARE

## 2020-09-22 ENCOUNTER — TELEPHONE (OUTPATIENT)
Dept: FAMILY MEDICINE CLINIC | Age: 55
End: 2020-09-22

## 2020-09-22 VITALS — HEIGHT: 67 IN | WEIGHT: 283 LBS | BODY MASS INDEX: 44.42 KG/M2

## 2020-09-22 PROCEDURE — 1036F TOBACCO NON-USER: CPT | Performed by: ORTHOPAEDIC SURGERY

## 2020-09-22 PROCEDURE — 99214 OFFICE O/P EST MOD 30 MIN: CPT | Performed by: ORTHOPAEDIC SURGERY

## 2020-09-22 PROCEDURE — 3017F COLORECTAL CA SCREEN DOC REV: CPT | Performed by: ORTHOPAEDIC SURGERY

## 2020-09-22 PROCEDURE — 20610 DRAIN/INJ JOINT/BURSA W/O US: CPT | Performed by: ORTHOPAEDIC SURGERY

## 2020-09-22 PROCEDURE — G8427 DOCREV CUR MEDS BY ELIG CLIN: HCPCS | Performed by: ORTHOPAEDIC SURGERY

## 2020-09-22 PROCEDURE — G8417 CALC BMI ABV UP PARAM F/U: HCPCS | Performed by: ORTHOPAEDIC SURGERY

## 2020-09-22 RX ORDER — HYALURONATE SODIUM 10 MG/ML
40 SYRINGE (ML) INTRAARTICULAR ONCE
Status: SHIPPED | OUTPATIENT
Start: 2020-09-22

## 2020-09-22 RX ORDER — HYALURONATE SODIUM 10 MG/ML
40 SYRINGE (ML) INTRAARTICULAR ONCE
Status: CANCELLED | OUTPATIENT
Start: 2020-09-22 | End: 2020-09-22

## 2020-09-22 RX ORDER — HYALURONATE SODIUM 10 MG/ML
20 SYRINGE (ML) INTRAARTICULAR ONCE
Status: COMPLETED | OUTPATIENT
Start: 2020-09-22 | End: 2020-09-22

## 2020-09-22 RX ADMIN — Medication 20 MG: at 10:41

## 2020-09-22 NOTE — TELEPHONE ENCOUNTER
Pt very concerned with how high his TSH levels are. Wants to talk to Dr Scar Simpson. Wants to know what this means and what he needs to do.

## 2020-09-22 NOTE — TELEPHONE ENCOUNTER
Patient called and is concerned with his lab results that he received on mychart please call patient to discuss

## 2020-09-22 NOTE — PROGRESS NOTES
Chief Complaint  Injections (#3 EUFLEXXA RIGHT KNEE) and Shoulder Pain (LEFT SHOULDER PAIN-left shoulder: no injury, pain for years, hx of stroke so has slight numbness, hx of injection whiched help, no pt, some popping, limited ROM due to pain, has anterior pain )      History of Present Illness:  Marques Hansen is a 63 y.o. y/o male who presents today for follow up of his right and left knees and evaluation of his left shoulder. He is here today for his third Euflexxa injection the right knee and his left shoulder is a new problem. He is had pain in his shoulder for years. No injury. He did have a stroke in the past and so has had some left upper extremity difficulty, however never had any shoulder surgery in the past.  He states he did have a corticosteroid injection in the past.  No recent physical therapy. He is having pain in the posterior lateral aspect of the shoulder as well as noticing some popping with certain motions. No significant neck pain currently. He does use medical marijuana. He also is taking ibuprofen and acetaminophen.       Medical History  Past Medical History:   Diagnosis Date    Bipolar disorder ([address]     CAD (coronary artery disease) 8/25/10    Cerebral artery occlusion with cerebral infarction ([address] 2011    L hand less fine motor movements    Chronic back pain     Controlled type 2 diabetes mellitus with diabetic polyneuropathy, without long-term current use of insulin [facility]     Controlled type 2 diabetes mellitus with diabetic polyneuropathy, without long-term current use of insulin ([address]     Depression 8/25/10    Displacement of cervical intervertebral disc without myelopathy     Gastroesophageal reflux 8/25/10    Hypercholesteremia     Hyperlipidemia 8/25/10    Hypertension     benign essential    Hypogonadism     Hypothyroidism     acquired    Morbid obesity with BMI of 40.0-44.9, adult ([address]     Morbid obesity with BMI of 40.0-44.9, adult ([address]     Osteoarthritis     Osteoarthritis of spine 8/25/10       Past Surgical History:   Procedure Laterality Date    CERVICAL FUSION      CHOLECYSTECTOMY      COLONOSCOPY N/A 2019    COLONOSCOPY POLYPECTOMY SNARE/COLD BIOPSY performed by Shanta Leary MD at [address] Bilateral 2019    BILATERAL LUMBAR THREE TRANSFORAMINAL EPIDURAL STEROID INJECTION SITE CONFIRMED BY FLUOROSCOPY performed by Audrey Valladares MD at [address]      LUMBAR SPINE SURGERY Bilateral 2019    BILATERAL L3 TRANSFORAMINAL EPIDURAL STEROID INJECTION WITH FLUOROSCOPY performed by Audrey Valladares MD at [place] [address] History     Socioeconomic History    Marital status: Legally      Spouse name: Not on file    Number of children: Not on file    Years of education: Not on file    Highest education level: Not on file   Occupational History    Not on file   Social Needs    Financial resource strain: Not on file    Food insecurity     Worry: Not on file     Inability: Not on file    Transportation needs     Medical: Not on file     Non-medical: Not on file   Tobacco Use    Smoking status: Former Smoker     Packs/day: 1.00     Years: 30.00     Pack years: 30.00     Types: Cigarettes     Last attempt to quit: 3/20/2020     Years since quittin.5    Smokeless tobacco: Never Used   Substance and Sexual Activity    Alcohol use: No    Drug use: Yes     Types: Marijuana     Comment: Medical marijuana card     Sexual activity: Yes   Lifestyle    Physical activity     Days per week: Not on file     Minutes per session: Not on file    Stress: Not on file   Relationships    Social connections     Talks on phone: Not on file     Gets together: Not on file     Attends Advent service: Not on file     Active member of club or organization: Not on file     Attends meetings of clubs or organizations: Not on file     Relationship status: Not on file    Intimate partner violence     Fear of current or ex partner: Not on file     Emotionally abused: Not on file     Physically abused: Not on file     Forced sexual activity: Not on file   Other Topics Concern    Not on file   Social History Narrative    Not on file       Family History   Problem Relation Age of Onset    Cancer Mother     Heart Disease Father     High Blood Pressure Father         Review of Systems  Pertinent items are noted in HPI  Review of systems reviewed from Patient History Form dated on 9/22/2020 and available in the patient's chart under the Media tab. Vital Signs  There were no vitals filed for this visit. General Exam:   Constitutional: Patient is adequately groomed with no evidence of malnutrition  Mental Status: The patient is oriented to time, place and person. The patient's mood and affect are appropriate. Lymphatic: The lymphatic examination bilaterally reveals all areas to be without enlargement or induration. Neurological: The patient has good coordination. There is no weakness or sensory deficit. Gait: Normal    Left shoulder examination  Inspection: No atrophy or bruising    Palpation: Tender to palpation posterior lateral aspect the shoulder and mildly over the biceps    Range of Motion: Forward elevation 170, external rotation 40, internal rotation to L3    Sensation: In tact to light touch all nerve distributions     Strength: 4+/5 supraspinatus, 4+/5 infraspinatus, negative liftoff    Special Tests: Positive Vega, negative Neer, positive [place], positive speeds    Skin: There are no additional worrisome rashes, ulcerations or lesions. Circulation normal    Additional Examinations:  Right Upper Extremity:  Examination of the right upper extremity does not show any tenderness, deformity or injury. Range of motion is unremarkable. There is no gross instability. There are no rashes, ulcerations or lesions.   Strength and tone are normal.      Radiology:     X-rays obtained and reviewed in office:  AP, Grashey, axillary, scapular Y 4 views left shoulder obtained 9/22/2020 demonstrate no acute fracture. No dislocation. Mild degenerative changes to the acromioclavicular joint. Glenohumeral joint is well-maintained. No bony lesions or loose bodies. Assessment:  54-year-old male with left shoulder pain, rotator cuff tendinitis versus tear, right and left knee osteoarthritis    Office Procedures:  Orders Placed This Encounter   Procedures    XR SHOULDER LEFT (MIN 2 VIEWS)     Standing Status:   Future     Number of Occurrences:   1     Standing Expiration Date:   9/22/2021   Ander Louis [facility] Physical Therapy     Referral Priority:   Routine     Referral Type:   Eval and Treat     Referral Reason:   Specialty Services Required     Requested Specialty:   Physical Therapy     Number of Visits Requested:   1    [place] ARTHROCENTESIS ASPIR&/INJ MAJOR JT/BURSA W/O US       Plan:   -We will proceed with his injection for Euflexxa today for his right knee which will finish his series  -For his left shoulder we will begin with physical therapy and get him set up for a one-time visit for evaluation and develop a home physical therapy exercise program  -Medications, ice, activity modification  -We discussed that he still having significant symptoms in 4 to 6 weeks consider MRI to evaluate further for rotator cuff tear  -If there are issues in interim we will contact the office    Right Knee Euflexxa Injection    The patient returns today for their third Euflexxa injection to the right knee(s) for diagnosis of osteoarthritis. The risks, benefits, and complications of the injections were again discussed in detail with the patient. The risks discussed included but are not limited to infection, skin reactions, hot swollen joints, and anaphylaxis. The patient gave verbal informed consent for the injection.  The patient skin was prepped with iodine and the the right knee joint(s) was injected with 2 ml of Euflexxa intra-articularly under sterile conditions through the anterolateral portal position. The patient tolerated the injection reasonably well. The patient was given instructions to ice the right knee(s) and avoid strenuous activities for 24-48 hours. The patient was instructed to call the office immediately if there is increased pain, redness, warmth, fever, or chills. We will see the patient back in 4-6 weeks. Ramón Donohue MD  [address] [facility] partner of [address]      Voice Recognition Dictation disclaimer: Please note that portions of this chart were generated using Dragon dictation software. Although every effort was made to ensure the accuracy of this automated transcription, some errors in transcription may have occurred.

## 2020-09-23 ENCOUNTER — TELEPHONE (OUTPATIENT)
Dept: FAMILY MEDICINE CLINIC | Age: 55
End: 2020-09-23

## 2020-09-24 NOTE — TELEPHONE ENCOUNTER
----- Message from Amos Murphy sent at 9/24/2020  8:57 AM EDT -----  Subject: Message to Provider    QUESTIONS  Information for Provider? Pt is requesting a call back about his lab   results. Pt states that he doesn't get off until 1pm. If called before he   gets off work please call him at his work number [phone].  ---------------------------------------------------------------------------  --------------  Serjio CARDENAS  What is the best way for the office to contact you? OK to leave message on   voicemail  Do not leave any message   patient will call back for answer  Preferred Call Back Phone Number? [phone]  ---------------------------------------------------------------------------  --------------  SCRIPT ANSWERS  Relationship to Patient?  Self

## 2020-09-26 RX ORDER — LEVOTHYROXINE SODIUM 88 UG/1
88 TABLET ORAL DAILY
Qty: 90 TABLET | Refills: 1 | Status: SHIPPED | OUTPATIENT
Start: 2020-09-26 | End: 2021-03-09 | Stop reason: DRUGHIGH

## 2020-09-28 ENCOUNTER — HOSPITAL ENCOUNTER (OUTPATIENT)
Dept: PHYSICAL THERAPY | Age: 55
Setting detail: THERAPIES SERIES
Discharge: HOME | End: 2020-09-28
Payer: MEDICARE

## 2020-10-06 RX ORDER — ATORVASTATIN CALCIUM 10 MG/1
10 TABLET, FILM COATED ORAL DAILY
Qty: 30 TABLET | Refills: 3 | Status: SHIPPED | OUTPATIENT
Start: 2020-10-06 | End: 2020-10-09 | Stop reason: SDUPTHER

## 2020-10-07 NOTE — TELEPHONE ENCOUNTER
Future Appointments   Date Time Provider Clair Valencia   11/2/2020  8:30 AM SCHEDULE, [facility] 100 [facility] 100 [facility]   11/3/2020  8:00 AM Jolly Leyden, MD [facility]   3/9/2021  1:00 PM Hyun Cronin DO [facility] 100 [facility] 9/8/2020

## 2020-10-09 RX ORDER — ATORVASTATIN CALCIUM 10 MG/1
10 TABLET, FILM COATED ORAL DAILY
Qty: 90 TABLET | Refills: 1 | Status: SHIPPED | OUTPATIENT
Start: 2020-10-09 | End: 2021-04-15

## 2020-10-16 ENCOUNTER — TELEPHONE (OUTPATIENT)
Dept: FAMILY MEDICINE CLINIC | Age: 55
End: 2020-10-16

## 2020-10-19 ENCOUNTER — OFFICE VISIT (OUTPATIENT)
Dept: PRIMARY CARE CLINIC | Age: 55
End: 2020-10-19
Payer: MEDICARE

## 2020-10-19 PROCEDURE — G8428 CUR MEDS NOT DOCUMENT: HCPCS | Performed by: NURSE PRACTITIONER

## 2020-10-19 PROCEDURE — 99211 OFF/OP EST MAY X REQ PHY/QHP: CPT | Performed by: NURSE PRACTITIONER

## 2020-10-19 PROCEDURE — G8417 CALC BMI ABV UP PARAM F/U: HCPCS | Performed by: NURSE PRACTITIONER

## 2020-10-19 NOTE — PROGRESS NOTES
Patient Seen in: [facility] AND [facility] Emergency Department    History   No chief complaint on file. HPI    Patient presents to the ED complaining of heavy menstrual flow for the past 3 days.   She states that she was diagnosed with a PE less than a melvin Una Davis received a viral test for COVID-19. They were educated on isolation and quarantine as appropriate. For any symptoms, they were directed to seek care from their PCP, given contact information to establish with a doctor, directed to an urgent care or the emergency room. are normal. Right eye exhibits no discharge. Left eye exhibits no discharge. Neck: No tracheal deviation present. Cardiovascular: Normal rate and intact distal pulses. Pulmonary/Chest: Effort normal. No stridor. No respiratory distress.    Abdominal: pertinent discharge summaries, testing, and procedures and reviewed those reports. Complicating Factors:  The patient already has has Pulmonary embolus ([address] and Other acute pulmonary embolism without acute cor pulmonale ([address] on her problem list. to con

## 2020-10-19 NOTE — PATIENT INSTRUCTIONS

## 2020-10-20 LAB — SARS-COV-2: DETECTED

## 2020-10-21 ENCOUNTER — CARE COORDINATION (OUTPATIENT)
Dept: CARE COORDINATION | Age: 55
End: 2020-10-21

## 2020-10-21 RX ORDER — HYDROXYZINE 50 MG/1
50 TABLET, FILM COATED ORAL 4 TIMES DAILY PRN
Qty: 40 TABLET | Refills: 0 | Status: SHIPPED | OUTPATIENT
Start: 2020-10-21 | End: 2020-10-31

## 2020-10-21 NOTE — CARE COORDINATION
Patient contacted regarding SWRLY-50 diagnosis\". Discussed COVID-19 related testing which was available at this time. Test results were positive. Patient informed of results, if available? Yes aware    Care Transition Nurse/ Ambulatory Care Manager contacted the patient by telephone to perform post discharge assessment. Verified name and  with patient as identifiers. Provided introduction to self, and explanation of the CTN/ACM role, and reason for call due to risk factors for infection and/or exposure to COVID-19. Symptoms reviewed with patient who verbalized the following symptoms: fatigue, pain or aching joints, loss of taste or smell, sweating, dizziness/lightheadedness, no new symptoms and no worsening symptoms. Due to no new or worsening symptoms encounter was not routed to provider for escalation. Discussed follow-up appointments. [facility] follow up appointment(s):   Future Appointments   Date Time Provider Clair Valencia   2020  8:30 AM SCHEDULE, [facility] 100 [address]   11/3/2020  8:00 AM Julee Saucedo MD [facility]   3/9/2021  1:00 PM Donah Carrel, DO [address]          Advance Care Planning:   Does patient have an Advance Directive:  not on file; education provided. Patient has following risk factors of: diabetes and obese. CTN/ACM reviewed discharge instructions, medical action plan and red flags such as increased shortness of breath, increasing fever and signs of decompensation with patient who verbalized understanding. Discussed exposure protocols and quarantine with CDC Guidelines What to do if you are sick with coronavirus disease .  Patient was given an opportunity for questions and concerns. The patient agrees to contact the [facility] exposure line [phone], local [facility] department [place] Department of Health: ([phone]) and PCP office for questions related to their healthcare.  CTN/ACM provided contact information for future needs.    Reviewed and educated patient on any new and changed medications related to discharge diagnosis     Pt will be further monitored by COVID Loop Team based on severity of symptoms and risk factors.

## 2020-12-15 RX ORDER — PREGABALIN 150 MG/1
CAPSULE ORAL
Qty: 60 CAPSULE | Refills: 0 | Status: SHIPPED | OUTPATIENT
Start: 2020-12-15 | End: 2021-01-27 | Stop reason: SDUPTHER

## 2020-12-15 NOTE — TELEPHONE ENCOUNTER
Future Appointments   Date Time Provider Clair Valencia   3/9/2021  1:00 PM Merrilyn Councilman, DO [facility]  100 [facility]     LOV 9/8/2020

## 2021-01-26 ENCOUNTER — OFFICE VISIT (OUTPATIENT)
Dept: PRIMARY CARE CLINIC | Age: 56
End: 2021-01-26
Payer: MEDICARE

## 2021-01-26 DIAGNOSIS — Z11.59 SCREENING FOR VIRAL DISEASE: Primary | ICD-10-CM

## 2021-01-26 PROCEDURE — G8417 CALC BMI ABV UP PARAM F/U: HCPCS | Performed by: NURSE PRACTITIONER

## 2021-01-26 PROCEDURE — G8428 CUR MEDS NOT DOCUMENT: HCPCS | Performed by: NURSE PRACTITIONER

## 2021-01-26 PROCEDURE — 99211 OFF/OP EST MAY X REQ PHY/QHP: CPT | Performed by: NURSE PRACTITIONER

## 2021-01-26 NOTE — PROGRESS NOTES
Angelica Miguel. received a viral test for COVID-19. They were educated on isolation and quarantine as appropriate. For any symptoms, they were directed to seek care from their PCP, given contact information to establish with a doctor, directed to an urgent care or the emergency room.

## 2021-01-26 NOTE — PATIENT INSTRUCTIONS

## 2021-01-27 DIAGNOSIS — E11.42 DIABETIC POLYNEUROPATHY ASSOCIATED WITH TYPE 2 DIABETES MELLITUS (HCC): ICD-10-CM

## 2021-01-27 LAB — SARS-COV-2, NAA: NOT DETECTED

## 2021-01-27 RX ORDER — PREGABALIN 150 MG/1
CAPSULE ORAL
Qty: 60 CAPSULE | Refills: 0 | Status: SHIPPED | OUTPATIENT
Start: 2021-01-27 | End: 2021-03-24 | Stop reason: SDUPTHER

## 2021-01-27 NOTE — TELEPHONE ENCOUNTER
Future Appointments   Date Time Provider Clair Valencia   2/9/2021  2:00 PM DO TAMARA Conroy  [facility]   3/9/2021  1:00 PM DO TAMARA Conroy  [facility]     LOV 9/8/2020

## 2021-02-09 ENCOUNTER — OFFICE VISIT (OUTPATIENT)
Dept: FAMILY MEDICINE CLINIC | Age: 56
End: 2021-02-09
Payer: MEDICARE

## 2021-02-09 VITALS
DIASTOLIC BLOOD PRESSURE: 94 MMHG | OXYGEN SATURATION: 97 % | BODY MASS INDEX: 46.05 KG/M2 | RESPIRATION RATE: 16 BRPM | TEMPERATURE: 99 F | HEART RATE: 85 BPM | WEIGHT: 294 LBS | SYSTOLIC BLOOD PRESSURE: 160 MMHG

## 2021-02-09 DIAGNOSIS — I10 ESSENTIAL HYPERTENSION: ICD-10-CM

## 2021-02-09 DIAGNOSIS — E66.01 CLASS 3 SEVERE OBESITY DUE TO EXCESS CALORIES WITH SERIOUS COMORBIDITY AND BODY MASS INDEX (BMI) OF 40.0 TO 44.9 IN ADULT (HCC): ICD-10-CM

## 2021-02-09 DIAGNOSIS — M79.675 GREAT TOE PAIN, LEFT: ICD-10-CM

## 2021-02-09 DIAGNOSIS — E11.42 CONTROLLED TYPE 2 DIABETES MELLITUS WITH DIABETIC POLYNEUROPATHY, WITHOUT LONG-TERM CURRENT USE OF INSULIN (HCC): Primary | ICD-10-CM

## 2021-02-09 LAB
CREATININE URINE POCT: NORMAL
HBA1C MFR BLD: 6.3 %
MICROALBUMIN/CREAT 24H UR: NORMAL MG/G{CREAT}
MICROALBUMIN/CREAT UR-RTO: NORMAL

## 2021-02-09 PROCEDURE — G8417 CALC BMI ABV UP PARAM F/U: HCPCS | Performed by: FAMILY MEDICINE

## 2021-02-09 PROCEDURE — 83036 HEMOGLOBIN GLYCOSYLATED A1C: CPT | Performed by: FAMILY MEDICINE

## 2021-02-09 PROCEDURE — 2022F DILAT RTA XM EVC RTNOPTHY: CPT | Performed by: FAMILY MEDICINE

## 2021-02-09 PROCEDURE — 82044 UR ALBUMIN SEMIQUANTITATIVE: CPT | Performed by: FAMILY MEDICINE

## 2021-02-09 PROCEDURE — 3044F HG A1C LEVEL LT 7.0%: CPT | Performed by: FAMILY MEDICINE

## 2021-02-09 PROCEDURE — G8427 DOCREV CUR MEDS BY ELIG CLIN: HCPCS | Performed by: FAMILY MEDICINE

## 2021-02-09 PROCEDURE — 3017F COLORECTAL CA SCREEN DOC REV: CPT | Performed by: FAMILY MEDICINE

## 2021-02-09 PROCEDURE — 1036F TOBACCO NON-USER: CPT | Performed by: FAMILY MEDICINE

## 2021-02-09 PROCEDURE — G8484 FLU IMMUNIZE NO ADMIN: HCPCS | Performed by: FAMILY MEDICINE

## 2021-02-09 PROCEDURE — 99214 OFFICE O/P EST MOD 30 MIN: CPT | Performed by: FAMILY MEDICINE

## 2021-02-09 RX ORDER — ASPIRIN 325 MG
325 TABLET ORAL DAILY
COMMUNITY
End: 2021-04-19 | Stop reason: ALTCHOICE

## 2021-02-09 RX ORDER — BLOOD PRESSURE TEST KIT
1 KIT MISCELLANEOUS 2 TIMES DAILY
Qty: 1 KIT | Refills: 0 | Status: SHIPPED | OUTPATIENT
Start: 2021-02-09

## 2021-02-09 RX ORDER — LISINOPRIL 30 MG/1
30 TABLET ORAL DAILY
Qty: 30 TABLET | Refills: 1 | Status: SHIPPED | OUTPATIENT
Start: 2021-02-09 | End: 2021-02-09

## 2021-02-09 RX ORDER — LISINOPRIL 30 MG/1
30 TABLET ORAL DAILY
Qty: 90 TABLET | Refills: 1 | Status: SHIPPED | OUTPATIENT
Start: 2021-02-09

## 2021-02-09 ASSESSMENT — PATIENT HEALTH QUESTIONNAIRE - PHQ9
SUM OF ALL RESPONSES TO PHQ QUESTIONS 1-9: 1
SUM OF ALL RESPONSES TO PHQ QUESTIONS 1-9: 1
2. FEELING DOWN, DEPRESSED OR HOPELESS: 1
SUM OF ALL RESPONSES TO PHQ QUESTIONS 1-9: 1

## 2021-02-09 ASSESSMENT — ENCOUNTER SYMPTOMS: BACK PAIN: 1

## 2021-02-09 NOTE — LETTER
Name: ___________________________________________    Goal Blood Pressure______________/_____________  Date Blood Pressure Time Blood Pressure Time Blood Pressure Time  Notes

## 2021-02-09 NOTE — TELEPHONE ENCOUNTER
Future Appointments   Date Time Provider Clair Valencia   3/9/2021  1:00 PM [facility]   3/10/2021  8:00 AM Maryam Brown DO [facility] 100 [facility]     LOV 2/9/2021

## 2021-02-09 NOTE — PROGRESS NOTES
2/9/2021    This is a 63 y.o. male   Chief Complaint   Patient presents with    Other     weight - gained almost 50 lbs back since quit smoking     Diabetes     check   . HPI  Patient presents today for weight gain. Admits to back and knee pain, still not smoking, spending a lot of time at home and has been eatings sweets[place] 3 [place] and states that it takes the other days to recover his back pain. Admits to periodic HA's since his weight gain, denies dizziness. Also notices that he is snoring, was previously tested for SHANNON but was told didn't have it. Was previously able to manage his DM without medication.      Today's A1C = 6.3      Past Medical History:   Diagnosis Date    Bipolar disorder ([address]     CAD (coronary artery disease) 8/25/10    Cerebral artery occlusion with cerebral infarction ([address] 2011    L hand less fine motor movements    Chronic back pain     Controlled type 2 diabetes mellitus with diabetic polyneuropathy, without long-term current use of insulin [facility])     Controlled type 2 diabetes mellitus with diabetic polyneuropathy, without long-term current use of insulin ([address]     Depression 8/25/10    Displacement of cervical intervertebral disc without myelopathy     Gastroesophageal reflux 8/25/10    Hypercholesteremia     Hyperlipidemia 8/25/10    Hypertension     benign essential    Hypogonadism     Hypothyroidism     acquired    Morbid obesity with BMI of 40.0-44.9, adult ([address]     Morbid obesity with BMI of 40.0-44.9, adult ([address]     Osteoarthritis     Osteoarthritis of spine 8/25/10       Past Surgical History:   Procedure Laterality Date    CERVICAL FUSION      CHOLECYSTECTOMY      COLONOSCOPY N/A 6/12/2019    COLONOSCOPY POLYPECTOMY SNARE/COLD BIOPSY performed by Simona Manrique MD at [address] [place] [address] Bilateral 8/13/2019    BILATERAL LUMBAR THREE TRANSFORAMINAL EPIDURAL STEROID INJECTION SITE CONFIRMED BY FLUOROSCOPY performed by Kim Mccormick MD at [address] [place] [address]      LUMBAR SPINE SURGERY Bilateral 2019    BILATERAL L3 TRANSFORAMINAL EPIDURAL STEROID INJECTION WITH FLUOROSCOPY performed by Kim Mccormick MD at [facility] History     Socioeconomic History    Marital status: Legally      Spouse name: Not on file    Number of children: Not on file    Years of education: Not on file    Highest education level: Not on file   Occupational History    Not on file   Social Needs    Financial resource strain: Not on file    Food insecurity     Worry: Not on file     Inability: Not on file    Transportation needs     Medical: Not on file     Non-medical: Not on file   Tobacco Use    Smoking status: Former Smoker     Packs/day: 1.00     Years: 30.00     Pack years: 30.00     Types: Cigarettes     Quit date: 3/20/2020     Years since quittin.8    Smokeless tobacco: Never Used   Substance and Sexual Activity    Alcohol use: No    Drug use: Yes     Types: Marijuana     Comment: Medical marijuana card     Sexual activity: Yes   Lifestyle    Physical activity     Days per week: Not on file     Minutes per session: Not on file    Stress: Not on file   Relationships    Social connections     Talks on phone: Not on file     Gets together: Not on file     Attends Voodoo service: Not on file     Active member of club or organization: Not on file     Attends meetings of clubs or organizations: Not on file     Relationship status: Not on file    Intimate partner violence     Fear of current or ex partner: Not on file     Emotionally abused: Not on file     Physically abused: Not on file     Forced sexual activity: Not on file   Other Topics Concern    Not on file   Social History Narrative    Not on file       Family History   Problem Relation Age of Onset    Cancer Mother     Heart Disease Father     High Blood Pressure Father Current Outpatient Medications   Medication Sig Dispense Refill    aspirin 325 MG tablet Take 325 mg by mouth daily      Blood Pressure KIT 1 kit by Does not apply route 2 times daily 1 kit 0    lisinopril (PRINIVIL;ZESTRIL) 30 MG tablet Take 1 tablet by mouth daily 30 tablet 1    pregabalin (LYRICA) 150 MG capsule TAKE 1 CAPSULE BY MOUTH TWICE DAILY 60 capsule 0    atorvastatin (LIPITOR) 10 MG tablet TAKE 1 TABLET BY MOUTH DAILY 90 tablet 1    levothyroxine (SYNTHROID) 88 MCG tablet Take 1 tablet by mouth daily 90 tablet 1    diclofenac sodium (VOLTAREN) 1 % GEL Apply 4 g topically 4 times daily 5 Tube 0    sildenafil (VIAGRA) 100 MG tablet Take 1 tablet by mouth as needed for Erectile Dysfunction 30 tablet 3    medical marijuana Take by mouth as needed. Current Facility-Administered Medications   Medication Dose Route Frequency Provider Last Rate Last Admin    sodium hyaluronate (viscosup) injection 40 mg  40 mg Intra-articular Once Montserrat Cohn MD           Immunization History   Administered Date(s) Administered    Influenza Whole 10/28/2010    Influenza, Quadv, IM, PF (6 mo and older Fluzone, Flulaval, Fluarix, and 3 yrs and older Afluria) 09/27/2019    Pneumococcal Polysaccharide ([id number]) 05/24/2019    Tdap (Boostrix, Adacel) 05/28/2019       Allergies   Allergen Reactions    Mobic [Meloxicam]      Upset stomach    Morphine Sulfate Itching     Pt is only allergic to Morphine ER, not IVP Morphine.        Office Visit on 01/26/2021   Component Date Value Ref Range Status    SARS-CoV-2, ANTONELLA 01/26/2021 NOT DETECTED  NOT DETECTED Final    Comment: Testing Performed at [address] [place] [address], [address] [place] [id number] Lab Yris Xie, PhD.  Michelle Dozier Not Detected result does not preclude the possibility of SARS-CoV-2  infection since the adequacy of sample collection and/or low viral  burden may result in the presence of viral nucleic acids below the  analytical sensitivity of this test method. Test results should be used  with caution and in conjunction with other clinical and laboratory data  in making a diagnosis. The SARS-CoV-2 assay is a real-time RT-PCR test intended for the  qualitative detection of nucleic acid from the SARS-CoV-2 in  respiratory specimens from individuals. Testing is limited to the  guidelines of FDA Emergency Use Authorization FDA for performing  SARS-CoV-2 testing. Performed at: [facility]  [address] [place] [address], [address] [place] [address]  : Denilson Adame MD         Review of Systems   Constitutional: Negative for unexpected weight change. Respiratory:        Positive for snoring   Musculoskeletal: Positive for arthralgias (knees) and back pain. Neurological: Positive for numbness (mild - left foot). Negative for dizziness and headaches. BP (!) 160/94 (Site: Right Upper Arm, Position: Sitting)   Pulse 85   Temp 99 °F (37.2 °C) (Temporal)   Resp 16   Wt 294 lb (133.4 kg)   SpO2 97%   BMI 46.05 kg/m²     Physical Exam  Vitals signs reviewed. Constitutional:       Appearance: He is well-developed. He is obese. HENT:      Head: Normocephalic and atraumatic. Eyes:      Pupils: Pupils are equal, round, and reactive to light. Neck:      Musculoskeletal: Normal range of motion. Cardiovascular:      Rate and Rhythm: Normal rate and regular rhythm. Heart sounds: Normal heart sounds. No murmur. Pulmonary:      Effort: Pulmonary effort is normal.      Breath sounds: Normal breath sounds. No wheezing. Abdominal:      General: Bowel sounds are normal.      Tenderness: There is no abdominal tenderness. Neurological:      Mental Status: He is alert and oriented to person, place, and time. Psychiatric:         Behavior: Behavior normal.         Thought Content:  Thought content normal.         Judgment: Judgment normal.     Foot Exam: Bilateral good sensation, no punctures or cuts, tender lateral Great left toenail. Plan   Diagnosis Orders   1. Controlled type 2 diabetes mellitus with diabetic polyneuropathy, without long-term current use of insulin ([facility])  POCT glycosylated hemoglobin (Hb A1C)    Comprehensive Metabolic Panel     DIABETES FOOT EXAM    POCT microalbumin    CHRISTIANO - [place] Golden MD, Ophthalmology, [facility]   2. Essential hypertension  Blood Pressure KIT    lisinopril (PRINIVIL;ZESTRIL) 30 MG tablet   3. Great toe pain, left  Amb External Referral To Podiatry   4. Class 3 severe obesity due to excess calories with serious comorbidity and body mass index (BMI) of 40.0 to 44.9 in adult [facility])  Susi Dietz MD, Bariatric Surgery, [facility]       BP BID x 2 weeks and send readings via [facility]. Return in about 1 month (around 3/9/2021). Prior to Visit Medications    Medication Sig Taking? Authorizing Provider   aspirin 325 MG tablet Take 325 mg by mouth daily Yes Historical Provider, MD   Blood Pressure KIT 1 kit by Does not apply route 2 times daily Yes Rey Gravel, DO   lisinopril (PRINIVIL;ZESTRIL) 30 MG tablet Take 1 tablet by mouth daily Yes Rey Gravel, DO   pregabalin (LYRICA) 150 MG capsule TAKE 1 CAPSULE BY MOUTH TWICE DAILY Yes Rey Gravel, DO   atorvastatin (LIPITOR) 10 MG tablet TAKE 1 TABLET BY MOUTH DAILY Yes Rey Gravel, DO   levothyroxine (SYNTHROID) 88 MCG tablet Take 1 tablet by mouth daily Yes Rey Gravel, DO   diclofenac sodium (VOLTAREN) 1 % GEL Apply 4 g topically 4 times daily Yes Rey Gravel, DO   sildenafil (VIAGRA) 100 MG tablet Take 1 tablet by mouth as needed for Erectile Dysfunction Yes Rey Gravel, DO   medical marijuana Take by mouth as needed.  Yes Historical Provider, MD

## 2021-02-11 ENCOUNTER — TELEPHONE (OUTPATIENT)
Dept: BARIATRICS/WEIGHT MGMT | Age: 56
End: 2021-02-11

## 2021-02-11 NOTE — TELEPHONE ENCOUNTER
Patient was sent Dr. Garth Dyson digital bariatric seminar    He DOES have BWLS coverage with Medicare (NO Req diet, 80% coverage)     *Spoke w/pt, info given. Pt verbalized understanding. Appt sched. Np pk mailed. Per pt:  No prev wt loss sx, BMI > 40

## 2021-03-03 ENCOUNTER — HOSPITAL ENCOUNTER (OUTPATIENT)
Age: 56
Discharge: HOME | End: 2021-03-03
Payer: MEDICARE

## 2021-03-03 DIAGNOSIS — E03.9 HYPOTHYROIDISM, UNSPECIFIED TYPE: ICD-10-CM

## 2021-03-03 DIAGNOSIS — E78.00 ELEVATED LDL CHOLESTEROL LEVEL: ICD-10-CM

## 2021-03-03 LAB
CHOLESTEROL, TOTAL: 158 MG/DL (ref 0–199)
HDLC SERPL-MCNC: 36 MG/DL (ref 40–60)
LDL CHOLESTEROL CALCULATED: 96 MG/DL
T4 FREE: 1.1 NG/DL (ref 0.9–1.8)
TRIGL SERPL-MCNC: 131 MG/DL (ref 0–150)
TSH SERPL DL<=0.05 MIU/L-ACNC: 10.01 UIU/ML (ref 0.27–4.2)
VLDLC SERPL CALC-MCNC: 26 MG/DL

## 2021-03-03 PROCEDURE — 36415 COLL VENOUS BLD VENIPUNCTURE: CPT

## 2021-03-03 PROCEDURE — 84439 ASSAY OF FREE THYROXINE: CPT

## 2021-03-03 PROCEDURE — 84443 ASSAY THYROID STIM HORMONE: CPT

## 2021-03-03 PROCEDURE — 80061 LIPID PANEL: CPT

## 2021-03-09 ENCOUNTER — OFFICE VISIT (OUTPATIENT)
Dept: FAMILY MEDICINE CLINIC | Age: 56
End: 2021-03-09
Payer: MEDICARE

## 2021-03-09 VITALS
SYSTOLIC BLOOD PRESSURE: 130 MMHG | TEMPERATURE: 97.3 F | HEART RATE: 82 BPM | BODY MASS INDEX: 46.05 KG/M2 | OXYGEN SATURATION: 98 % | DIASTOLIC BLOOD PRESSURE: 86 MMHG | RESPIRATION RATE: 18 BRPM | WEIGHT: 294 LBS

## 2021-03-09 DIAGNOSIS — M79.641 BILATERAL HAND PAIN: ICD-10-CM

## 2021-03-09 DIAGNOSIS — M79.642 BILATERAL HAND PAIN: ICD-10-CM

## 2021-03-09 DIAGNOSIS — E03.9 HYPOTHYROIDISM, UNSPECIFIED TYPE: Primary | ICD-10-CM

## 2021-03-09 PROCEDURE — G8417 CALC BMI ABV UP PARAM F/U: HCPCS | Performed by: FAMILY MEDICINE

## 2021-03-09 PROCEDURE — G8484 FLU IMMUNIZE NO ADMIN: HCPCS | Performed by: FAMILY MEDICINE

## 2021-03-09 PROCEDURE — 3017F COLORECTAL CA SCREEN DOC REV: CPT | Performed by: FAMILY MEDICINE

## 2021-03-09 PROCEDURE — G8427 DOCREV CUR MEDS BY ELIG CLIN: HCPCS | Performed by: FAMILY MEDICINE

## 2021-03-09 PROCEDURE — 99214 OFFICE O/P EST MOD 30 MIN: CPT | Performed by: FAMILY MEDICINE

## 2021-03-09 PROCEDURE — 1036F TOBACCO NON-USER: CPT | Performed by: FAMILY MEDICINE

## 2021-03-09 RX ORDER — LEVOTHYROXINE SODIUM 0.1 MG/1
100 TABLET ORAL DAILY
Qty: 90 TABLET | Refills: 1 | Status: SHIPPED | OUTPATIENT
Start: 2021-03-09

## 2021-03-09 NOTE — PROGRESS NOTES
Rohini Potter. is a 63 y.o. male who presentsfor follow up of diabetes. .   Current symptoms include: sob from weight gain. Admits to fatigue, cold intolerance, and bilateral hand pain. Patient denies paresthesia of the feet, polydipsia, polyuria and visual disturbances. Evaluation to date has been: fasting blood sugar, fasting lipid panel, hemoglobin A1C and microalbuminuria. Home sugars: patient does not check sugars. Current treatments: No current medications. Last dilated eye exam over 1 year - negative for DR. Last foot exam 02/09/21. Last urine microalbumin 02/09/21.    02/09/21 A1C = 6.3    Patient's labs from March 3, 2021 within normal limits except for HDL 36, TSH 10.01, and free T4 of 1.1. Admits to eating poor. Taking Lipitor 10 mg. His last visit on February 9, 2020 patient was referred to bariatric surgery as well as podiatry. At his last appointment patient had also admitted to a 50 pound weight gain. Has an appointment with Dr. Shaila Sam on 03/18/21.   Past Medical History:   Diagnosis Date    Bipolar disorder ([address]     CAD (coronary artery disease) 8/25/10    Cerebral artery occlusion with cerebral infarction ([address] 2011    L hand less fine motor movements    Chronic back pain     Controlled type 2 diabetes mellitus with diabetic polyneuropathy, without long-term current use of insulin ([facility])     Controlled type 2 diabetes mellitus with diabetic polyneuropathy, without long-term current use of insulin ([address]     Depression 8/25/10    Displacement of cervical intervertebral disc without myelopathy     Gastroesophageal reflux 8/25/10    Hypercholesteremia     Hyperlipidemia 8/25/10    Hypertension     benign essential    Hypogonadism     Hypothyroidism     acquired    Morbid obesity with BMI of 40.0-44.9, adult ([address]     Morbid obesity with BMI of 40.0-44.9, adult ([address]     Osteoarthritis     Osteoarthritis of spine 8/25/10       Current Outpatient Medications Medication Sig Dispense Refill    levothyroxine (SYNTHROID) 100 MCG tablet Take 1 tablet by mouth daily 90 tablet 1    aspirin 325 MG tablet Take 325 mg by mouth daily      Blood Pressure KIT 1 kit by Does not apply route 2 times daily 1 kit 0    lisinopril (PRINIVIL;ZESTRIL) 30 MG tablet TAKE 1 TABLET BY MOUTH DAILY 90 tablet 1    pregabalin (LYRICA) 150 MG capsule TAKE 1 CAPSULE BY MOUTH TWICE DAILY 60 capsule 0    atorvastatin (LIPITOR) 10 MG tablet TAKE 1 TABLET BY MOUTH DAILY 90 tablet 1    sildenafil (VIAGRA) 100 MG tablet Take 1 tablet by mouth as needed for Erectile Dysfunction 30 tablet 3    medical marijuana Take by mouth as needed. Current Facility-Administered Medications   Medication Dose Route Frequency Provider Last Rate Last Admin    sodium hyaluronate (viscosup) injection 40 mg  40 mg Intra-articular Once Kayy Yang MD           Allergies   Allergen Reactions    Mobic [Meloxicam]      Upset stomach    Morphine Sulfate Itching     Pt is only allergic to Morphine ER, not IVP Morphine. Social History     Tobacco Use    Smoking status: Former Smoker     Packs/day: 1.00     Years: 30.00     Pack years: 30.00     Types: Cigarettes     Quit date: 3/20/2020     Years since quittin.9    Smokeless tobacco: Never Used   Substance Use Topics    Alcohol use: No    Drug use: Yes     Types: Marijuana     Comment: Medical marijuana card        Vitals:    21 1300   BP: 130/86   Pulse: 82   Resp: 18   Temp: 97.3 °F (36.3 °C)   SpO2: 98%         Review of Systems    Review of Systems   Constitutional: Positive for fatigue. Eyes: Negative for visual disturbance. Endocrine: Positive for cold intolerance. Negative for polydipsia, polyphagia and polyuria. Musculoskeletal:        Positive for bilateral hand pain   Neurological: Negative for numbness. Objective:      Physical Exam  Vitals signs reviewed. Constitutional:       Appearance: He is well-developed.  He is obese. HENT:      Head: Normocephalic and atraumatic. Eyes:      Pupils: Pupils are equal, round, and reactive to light. Neck:      Musculoskeletal: Normal range of motion. Cardiovascular:      Rate and Rhythm: Normal rate and regular rhythm. Heart sounds: Normal heart sounds. No murmur. Pulmonary:      Effort: Pulmonary effort is normal.      Breath sounds: Normal breath sounds. Abdominal:      General: Bowel sounds are normal.      Tenderness: There is no abdominal tenderness. Neurological:      Mental Status: He is alert and oriented to person, place, and time. Psychiatric:         Behavior: Behavior normal.         Thought Content: Thought content normal.         Judgment: Judgment normal.         Monofilament Foot &Sensory exam: Done on February 9, 2021  Laboratory:  No results for input(s): LABA1C in the last 72 hours. No results for input(s): LABMICR in the last 72 hours. Lab Results   Component Value Date     (L) 09/14/2020    K 4.6 09/14/2020     09/14/2020    CO2 25 09/14/2020    BUN 13 09/14/2020    CREATININE 1.0 09/14/2020    GLUCOSE 121 (H) 09/14/2020    CALCIUM 9.0 09/14/2020    PROT 7.3 09/14/2020    LABALBU 4.0 09/14/2020    BILITOT 0.4 09/14/2020    ALKPHOS 39 (L) 09/14/2020    AST 20 09/14/2020    ALT 21 09/14/2020    LABGLOM >60 09/14/2020    GFRAA >60 09/14/2020    AGRATIO 1.2 09/14/2020    GLOB 3.3 09/14/2020          Assessment:      Diabetes mellitus Type II, under excellent control. Diagnosis Orders   1. Hypothyroidism, unspecified type  levothyroxine (SYNTHROID) 100 MCG tablet    TSH without Reflex    T4, Free   2.  Bilateral hand pain  Gabriel Hammonds MD, Hand Surgery (Hand, Wrist, Upper Extremity), [facility]        Plan:      Increased Synthroid to 100 mch, TSH/free t4 in 2 months  Encouraged pt to set up Ophthalmology appointment

## 2021-03-16 ENCOUNTER — TELEPHONE (OUTPATIENT)
Dept: BARIATRICS/WEIGHT MGMT | Age: 56
End: 2021-03-16

## 2021-03-16 NOTE — TELEPHONE ENCOUNTER
Called as a new pt courtesy call - spoke w patient. Did receive paperwork - told patient to have new pt paperwork completely filled out, insurance card, and id and to arrive at appt time. If they didn't have the paperwork filled out and arrive on time may be rescheduled.  Yold to arrive @ 342

## 2021-03-18 ENCOUNTER — OFFICE VISIT (OUTPATIENT)
Dept: BARIATRICS/WEIGHT MGMT | Age: 56
End: 2021-03-18
Payer: MEDICARE

## 2021-03-18 VITALS
SYSTOLIC BLOOD PRESSURE: 139 MMHG | RESPIRATION RATE: 18 BRPM | DIASTOLIC BLOOD PRESSURE: 83 MMHG | HEART RATE: 80 BPM | TEMPERATURE: 97.8 F | BODY MASS INDEX: 45.39 KG/M2 | WEIGHT: 289.2 LBS | HEIGHT: 67 IN | OXYGEN SATURATION: 97 %

## 2021-03-18 DIAGNOSIS — I10 ESSENTIAL HYPERTENSION: ICD-10-CM

## 2021-03-18 DIAGNOSIS — E78.2 MIXED HYPERLIPIDEMIA: ICD-10-CM

## 2021-03-18 DIAGNOSIS — Z01.818 PREOPERATIVE CLEARANCE: ICD-10-CM

## 2021-03-18 DIAGNOSIS — E66.9 DIABETES MELLITUS TYPE 2 IN OBESE (HCC): ICD-10-CM

## 2021-03-18 DIAGNOSIS — E11.69 DIABETES MELLITUS TYPE 2 IN OBESE (HCC): ICD-10-CM

## 2021-03-18 DIAGNOSIS — E66.01 MORBID OBESITY WITH BMI OF 45.0-49.9, ADULT (HCC): Primary | ICD-10-CM

## 2021-03-18 PROCEDURE — 1036F TOBACCO NON-USER: CPT | Performed by: SURGERY

## 2021-03-18 PROCEDURE — G8427 DOCREV CUR MEDS BY ELIG CLIN: HCPCS | Performed by: SURGERY

## 2021-03-18 PROCEDURE — 2022F DILAT RTA XM EVC RTNOPTHY: CPT | Performed by: SURGERY

## 2021-03-18 PROCEDURE — 3017F COLORECTAL CA SCREEN DOC REV: CPT | Performed by: SURGERY

## 2021-03-18 PROCEDURE — 99205 OFFICE O/P NEW HI 60 MIN: CPT | Performed by: SURGERY

## 2021-03-18 PROCEDURE — G8484 FLU IMMUNIZE NO ADMIN: HCPCS | Performed by: SURGERY

## 2021-03-18 PROCEDURE — G8417 CALC BMI ABV UP PARAM F/U: HCPCS | Performed by: SURGERY

## 2021-03-18 PROCEDURE — 3044F HG A1C LEVEL LT 7.0%: CPT | Performed by: SURGERY

## 2021-03-18 NOTE — PROGRESS NOTES
[facility]) Physicians   [employer]  Cynthia Lopez MD, [address] [facility], [address] [place] [address]    Myra Armstrong [address] . Phone: [phone]  Fax: [phone]       Chief Complaint   Patient presents with    Bariatric, Initial Visit     NP, [facility]           HPI:    Chele Hoskins. is a very pleasant 63 y.o. obese male ,   Body mass index is 45.3 kg/m². And multiple medical problems who is presenting for weight loss surgery evaluation and consultation by Dr. Kirstie De Leon. Patient has been struggling for several years now with obesity. Patient feels the weight is an obstacle to achieve and perform things in daily living as well risk on health. Tries to diet, and exercise but can't keep the weight off. Patient tried Low fat, high protein intermittent fasting, high protein and low carb. Patient has participated in meal replacement/liquid diets - Slimfast.  Patient has participated in weight loss medications - OTC years ago and can't recall name, also tried Topamax in 2013 and lost weight and other regimens, but with no sustainable weight loss. Patient  is very determined to lose weight and be healthy, and is interested in surgical weight loss for future weight loss. .    Otherwise patient denies any nausea, vomiting, fevers, chills, shortness of breath, chest pain, constipation or urinary symptoms.         Obesity related problems sOvaldo Dykes is dealing with:  Patient Active Problem List   Diagnosis    Diabetes mellitus type 2 in obese ([address]    Mixed hyperlipidemia    Hypothyroid    Hypogonadism    Polyp of rectum    Tobacco abuse    Spondylosis    Personal history of other diseases of the circulatory system    Pain in thoracic spine    Fracture with nonunion    Diet-controlled diabetes mellitus ([address]    Chronic pain disorder    [facility]brovascular accident [facility])    Essential hypertension    Backache    Morbid obesity with BMI of 45.0-49.9, adult ([address]    Bipolar disorder ([address]    Preoperative clearance           Pain Assessment   Denies any abdominal pain     Past Medical History:   Diagnosis Date    Bipolar disorder ([address]     CAD (coronary artery disease) 8/25/10    Cerebral artery occlusion with cerebral infarction ([address] 2011    L hand less fine motor movements    Chronic back pain     Controlled type 2 diabetes mellitus with diabetic polyneuropathy, without long-term current use of insulin ([facility])     Controlled type 2 diabetes mellitus with diabetic polyneuropathy, without long-term current use of insulin ([address]     Depression 8/25/10    Displacement of cervical intervertebral disc without myelopathy     Gastroesophageal reflux 8/25/10    Hypercholesteremia     Hyperlipidemia 8/25/10    Hypertension     benign essential    Hypogonadism     Hypothyroidism     acquired    Morbid obesity with BMI of 40.0-44.9, adult ([address]     Morbid obesity with BMI of 40.0-44.9, adult ([facility]     Osteoarthritis     Osteoarthritis of spine 8/25/10     Past Surgical History:   Procedure Laterality Date    CERVICAL FUSION      CHOLECYSTECTOMY      COLONOSCOPY N/A 6/12/2019    COLONOSCOPY POLYPECTOMY SNARE/COLD BIOPSY performed by Anson Galvez MD at [address] [place] [address] Bilateral 8/13/2019    BILATERAL LUMBAR THREE TRANSFORAMINAL EPIDURAL STEROID INJECTION SITE CONFIRMED BY FLUOROSCOPY performed by Kim Mccormick MD at [place] [address]. Bilateral 9/20/2019    BILATERAL L3 TRANSFORAMINAL EPIDURAL STEROID INJECTION WITH FLUOROSCOPY performed by Kim Mccormick MD at [address] [place] [address] History   Problem Relation Age of Onset    Cancer Mother     Heart Disease Father     High Blood Pressure Father     Elevated Lipids Father     High Blood Pressure Paternal Grandmother      Social History     Tobacco Use    Smoking status: Former Smoker     Packs/day: 1.00     Years: 30.00     Pack years: 30.00     Types: Cigarettes     Quit date: 3/20/2020     Years since quittin.9    Smokeless tobacco: Never Used   Substance Use Topics    Alcohol use: No     I counseled the patient on the importance of not smoking and risks of ETOH. Allergies   Allergen Reactions    Mobic [Meloxicam]      Upset stomach    Morphine Sulfate Itching     Pt is only allergic to Morphine ER, not IVP Morphine. Vitals:    21 0915   BP: 139/83   Pulse: 80   Resp: 18   Temp: 97.8 °F (36.6 °C)   SpO2: 97%   Weight: 289 lb 3.2 oz (131.2 kg)   Height: 5' 7\" (1.702 m)       Body mass index is 45.3 kg/m². Current Outpatient Medications:     levothyroxine (SYNTHROID) 100 MCG tablet, Take 1 tablet by mouth daily, Disp: 90 tablet, Rfl: 1    aspirin 325 MG tablet, Take 325 mg by mouth daily, Disp: , Rfl:     Blood Pressure KIT, 1 kit by Does not apply route 2 times daily, Disp: 1 kit, Rfl: 0    lisinopril (PRINIVIL;ZESTRIL) 30 MG tablet, TAKE 1 TABLET BY MOUTH DAILY, Disp: 90 tablet, Rfl: 1    pregabalin (LYRICA) 150 MG capsule, TAKE 1 CAPSULE BY MOUTH TWICE DAILY, Disp: 60 capsule, Rfl: 0    atorvastatin (LIPITOR) 10 MG tablet, TAKE 1 TABLET BY MOUTH DAILY, Disp: 90 tablet, Rfl: 1    sildenafil (VIAGRA) 100 MG tablet, Take 1 tablet by mouth as needed for Erectile Dysfunction, Disp: 30 tablet, Rfl: 3    medical marijuana, Take by mouth as needed. , Disp: , Rfl:       Review of Systems - History obtained from the patient  General ROS: overweight   Psychological ROS: negative  Ophthalmic ROS: negative  Neurological ROS: negative  ENT ROS: negative  Allergy and Immunology ROS: negative  Hematological and Lymphatic ROS: negative  Endocrine ROS: overweight  Breast ROS: negative  Respiratory ROS: negative  Cardiovascular ROS: negative  Gastrointestinal ROS:negative  Genito-Urinary ROS: negative  Musculoskeletal ROS: weight effects on joints  Skin ROS: negative    Physical Exam   Constitutional: Patient is oriented to person, place, and time. Vital signs are normal. Patient  appears well-developed and well-nourished. Patient  is active and cooperative. Non-toxic appearance. No distress. HENT:   Head: Normocephalic and atraumatic. Head is without laceration. Right Ear: External ear normal. No lacerations. No drainage, swelling or tenderness. Left Ear: External ear normal. No lacerations. No drainage, swelling or tenderness. Nose/Mouth/Throat: Patient is wearing mask due to Covid-19 pandemic precautions, following CDC and health authorities guidelines. Eyes: Conjunctivae, EOM and lids are normal. Pupils are equal, round, and reactive to light. Right eye exhibits no discharge. No foreign body present in the right eye. Left eye exhibits no discharge. No foreign body present in the left eye. No scleral icterus. Neck: Trachea normal and normal range of motion. Neck supple. No JVD present. No tracheal tenderness present. Carotid bruit is not present. No rigidity. No tracheal deviation and no edema present. No thyromegaly present. Cardiovascular: Normal rate, regular rhythm, normal heart sounds, intact distal pulses and normal pulses. Pulmonary/Chest: Effort normal and breath sounds normal. No stridor. No respiratory distress. Patient  has no wheezes. Patient has no rales. Patient exhibits no tenderness and no crepitus. Abdominal: Soft. Normal appearance and bowel sounds are normal. Patient exhibits no distension, no abdominal bruit, no ascites and no mass. There is no hepatosplenomegaly. There is no tenderness. There is no rigidity, no rebound, no guarding and no CVA tenderness. No hernia. Hernia confirmed negative in the ventral area. Musculoskeletal: Normal range of motion. Patient exhibits no edema or tenderness. Lymphadenopathy:        Head (right side): No submental, no submandibular, no preauricular, no posterior auricular and no occipital adenopathy present. Head (left side):  No submental, no submandibular, no preauricular, no posterior auricular and no occipital adenopathy present. Patient  has no cervical adenopathy. Right: No supraclavicular adenopathy present. Left: No supraclavicular adenopathy present. Neurological: Patient is alert and oriented to person, place, and time. Patient has normal strength. Coordination and gait normal. GCS eye subscore is 4. GCS verbal subscore is 5. GCS motor subscore is 6. Skin: Skin is warm and dry. No abrasion and no rash noted. Patient  is not diaphoretic. No cyanosis or erythema. Psychiatric: Patient has a normal mood and affect. speech is normal and behavior is normal. Cognition and memory are normal.         Russ Cooks was seen today for bariatric, initial visit. Diagnoses and all orders for this visit:    Morbid obesity with BMI of 45.0-49.9, adult ([facility]  -     CBC Auto Differential; Future  -     Comprehensive Metabolic Panel; Future  -     Hemoglobin A1C; Future  -     Iron and TIBC; Future  -     Lipid Panel; Future  -     TSH with Reflex; Future  -     Vitamin A; Future  -     Vitamin B1, Whole Blood; Future  -     Vitamin B12 & Folate; Future  -     Vitamin D 25 Hydroxy; Future  -     Vitamin E; Future  -     Protime-INR; Future  -     Ambulatory referral to Cardiology  -     Ambulatory referral to Pulmonology    Preoperative clearance  -     CBC Auto Differential; Future  -     Comprehensive Metabolic Panel; Future  -     Hemoglobin A1C; Future  -     Iron and TIBC; Future  -     Lipid Panel; Future  -     TSH with Reflex; Future  -     Vitamin A; Future  -     Vitamin B1, Whole Blood; Future  -     Vitamin B12 & Folate; Future  -     Vitamin D 25 Hydroxy; Future  -     Vitamin E; Future  -     Protime-INR; Future  -     Ambulatory referral to Cardiology  -     Ambulatory referral to Pulmonology    Essential hypertension  -     CBC Auto Differential; Future  -     Comprehensive Metabolic Panel;  Future  -     Hemoglobin A1C; Future  -     Iron and TIBC; Future  -     Lipid Panel; Future  -     TSH with Reflex; Future  -     Vitamin A; Future  -     Vitamin B1, Whole Blood; Future  -     Vitamin B12 & Folate; Future  -     Vitamin D 25 Hydroxy; Future  -     Vitamin E; Future  -     Protime-INR; Future  -     Ambulatory referral to Cardiology  -     Ambulatory referral to Pulmonology    Mixed hyperlipidemia  -     CBC Auto Differential; Future  -     Comprehensive Metabolic Panel; Future  -     Hemoglobin A1C; Future  -     Iron and TIBC; Future  -     Lipid Panel; Future  -     TSH with Reflex; Future  -     Vitamin A; Future  -     Vitamin B1, Whole Blood; Future  -     Vitamin B12 & Folate; Future  -     Vitamin D 25 Hydroxy; Future  -     Vitamin E; Future  -     Protime-INR; Future  -     Ambulatory referral to Cardiology  -     Ambulatory referral to Pulmonology    Diabetes mellitus type 2 in obese ([facility]  -     CBC Auto Differential; Future  -     Comprehensive Metabolic Panel; Future  -     Hemoglobin A1C; Future  -     Iron and TIBC; Future  -     Lipid Panel; Future  -     TSH with Reflex; Future  -     Vitamin A; Future  -     Vitamin B1, Whole Blood; Future  -     Vitamin B12 & Folate; Future  -     Vitamin D 25 Hydroxy; Future  -     Vitamin E; Future  -     Protime-INR; Future  -     Ambulatory referral to Cardiology  -     Ambulatory referral to Pulmonology          A/P  Rojas Erickson is a very pleasant 63 y.o. male with Obesity,  Body mass index is 45.3 kg/m². and multiple obesity related co-morbidities. Rojas Erickson is very motivated to lose weight and being more healthy.    We discussed how his weight affects his overall health including:  Patient Active Problem List   Diagnosis    Diabetes mellitus type 2 in obese ([facility]    Mixed hyperlipidemia    Hypothyroid    Hypogonadism    Polyp of rectum    Tobacco abuse    Spondylosis    Personal history of other diseases of the circulatory system    Pain in thoracic spine    Fracture with nonunion    Diet-controlled diabetes mellitus ([facility]    Chronic pain disorder    [facility] accident [facility])    Essential hypertension    Backache    Morbid obesity with BMI of 45.0-49.9, adult ([facility]    Bipolar disorder ([facility]    Preoperative clearance      The patient underwent extensive dietary counseling. I have reviewed, discussed and agree with the dietary plan. Medical weight loss and different surgical options were discussed in details with patient. Jose F Maharaj is interested in surgical weight loss for future weight loss. Patient is interested in Laparoscopic Sleeve Gastrectomy, which I believe is an excellent option. We will proceed with pre-operative work up labs and studies. Will also petition patient's  insurance for approval for this procedure. I advised the patient that we can't guarantee final insurance approval.    Patient received dietary handouts and education. Patient advised that its their responsibility to follow up for studies, referrals and/or labs ordered today. Also discussed in details the importance of follow up, as well following the recommendations and completing the whole program to improve outcomes when it comes to healthier lifestyle as well weight loss. Patient also advised about risks and benefits being on a strict dietary regimen as well using supplements. Patient agrees and wants to proceed with weight loss planning     Obesity as a disease is considered a high risk to patients overall health and should therefore be considered a high risk disease state. Now with Covid-19 pandemic, CDC and health authorities does classify obese patients as vulnerable and high risk as well. Which makes weight loss a priority for improvement of their wellbeing and overall health.       CDC has issued the following statement as far Obese patients being at Increased Risk of being critically ill from SARS-Cov-2  \"Severe obesity increases the risk of a serious breathing problem called acute respiratory distress syndrome (ARDS), which is a major complication of [id number]-[id number] and can cause difficulties with a doctors ability to provide respiratory support for seriously ill patients. People living with severe obesity can have multiple serious chronic diseases and underlying health conditions that can increase the risk of severe illness from COVID-19. \"       Patient Instructions   Patient received dietary handouts and education. Pre-operative work up Ordered:    - [employer]. - Psych Evaluation.   - Cardiac Clearance. - Pulmonary Clearance. - EGD (Upper Endoscopy). - Support Group Attendance. - Obtain letter of medical necessity (PCP Letter). - Quit Smoking,  Alcohol, Caffeine and Carbonated Drinks  - Obtain records for Weight History 2 yrs. - Start Regular Exercise and track your activities. - Start Tracking your food Intake and follow dietary guidelines. - Avoid Pregnancy for 2 yrs from date of surgery. (for female patients in childbearing age)  - F/U in 4 weeks. Patient advised that its their responsibility to follow up for studies, referrals and/or labs ordered today. Please note that some or all of this report was generated using voice recognition software. Please notify me in case of any questions about the content of this document, as some errors in transcription may have occurred .

## 2021-03-18 NOTE — Clinical Note
Greatly appreciate the referral.  Excellent candidate for weight loss. We will keep you posted on Nick Has progress.

## 2021-03-18 NOTE — PROGRESS NOTES
Hugo Young. is a 63 y.o. male with a date of birth of 1965. Vitals:    03/18/21 0915   BP: 139/83   Pulse: 80   Resp: 18   Temp: 97.8 °F (36.6 °C)   SpO2: 97%    BMI: Body mass index is 45.3 kg/m². Obesity Classification: Class III    Weight History: Wt Readings from Last 3 Encounters:   03/18/21 289 lb 3.2 oz (131.2 kg)   03/09/21 294 lb (133.4 kg)   02/09/21 294 lb (133.4 kg)       Patient's lowest adult weight was 175 lbs at age 22. Patient's highest adult weight was 310 lbs at age 47. Patient has participated in the following weight loss programs: Low fat, high protein intermittent fasting, high protein and low carb. Patient has participated in meal replacement/liquid diets - Slimfast.  Patient has participated in weight loss medications - OTC years ago and can't recall name, also tried Topamax in 2013 and lost weight. Patient is not lactose intolerant. Patient does not have Sabianism/cultural food concerns. Patient does not have food allergies. Patient does tolerate artificial sweeteners. Pt reports poor sleep routine, but sleeps well when does. Pt did have sleep study in 2012. Previous smoker, quit tobacco products last year. Pt reports successful with wt loss in [id number] with intermittent fasting and high protein, but struggled during the pandemic. 24 hour recall/food frequency chart:  Breakfast: yes. - 5a - 4 slices berry + 3 HB eggs with 2 slices rye toast with bluberry preserves and butter / cereal or oatmeal occasionally / sausage gravy and biscuits   Snack: yes. - 9a - 4oz apple pie and Pepsi Zero  Lunch: sometimes skips - 11:15 - Frozen dinner with hawaiian rolls with handful of fritos  Snack: yes. - 3p - tuna salad sandwich on rye / HB eggs  Dinner: yes. - Big mac + 8 chix nuggets / hot mets / air fired regi's fish or chix / pot roast and brown rice / tuna mac n cheese with bread and butter  Snack: yes.  - SF pudding / Lonne Fought with pears / Alida Lard with

## 2021-03-18 NOTE — PATIENT INSTRUCTIONS
Patient received dietary handouts and education. Pre-operative work up Ordered:    - [employer]. - Psych Evaluation.   - Cardiac Clearance. - Pulmonary Clearance. - EGD (Upper Endoscopy). - Support Group Attendance. - Obtain letter of medical necessity (PCP Letter). - Quit Smoking,  Alcohol, Caffeine and Carbonated Drinks  - Obtain records for Weight History 2 yrs. - Start Regular Exercise and track your activities. - Start Tracking your food Intake and follow dietary guidelines. - Avoid Pregnancy for 2 yrs from date of surgery. (for female patients in childbearing age)  - F/U in 4 weeks. Patient advised that its their responsibility to follow up for studies, referrals and/or labs ordered today.

## 2021-03-19 ENCOUNTER — TELEPHONE (OUTPATIENT)
Dept: PULMONOLOGY | Age: 56
End: 2021-03-19

## 2021-03-19 NOTE — TELEPHONE ENCOUNTER
limited to the following:    Your Provider(s) may not able to provide medical treatment for your particular condition and you may be required to seek alternative healthcare or emergency care services.  The electronic systems or other security protocols or safeguards used in the Service could fail, causing a breach of privacy of your medical or other information.  Given regulatory requirements in certain jurisdictions, your Provider(s) diagnosis and/or treatment options, especially pertaining to certain prescriptions, may be limited. Acceptance   1. You understand that Services will be provided via Telehealth. This process involves the use of HIPAA compliant and secure, real-time audio-visual interfacing with a qualified and appropriately trained provider located at [facility]. 2. You understand that, under no circumstances, will this session be recorded. 3. You understand that the Provider(s) at [facility] and other clinical participants will be party to the information obtained during the Telehealth session in accordance with best medical practices. 4. You understand that the information obtained during the Telehealth session will be used to help determine the most appropriate treatment options. 5. You understand that You have the right to revoke this consent at any point in time. 6. You understand that Telehealth is voluntary, and that continued treatment is not dependent upon consent. 7. You understand that, in the event of non-consent to Telehealth services and/or technical difficulties, you will obtain services as typically provided in the absence of Telehealth technology. 8. You understand that this consent will be kept in Your medical record. 9. No potential benefits from the use of Telehealth or specific results can be guaranteed. Your condition may not be cured or improved and, in some cases, may get worse.    10. There are limitations in the provision of medical care and treatment via Telehealth and the Service and you may not be able to receive diagnosis and/or treatment through the Service for every condition for which you seek diagnosis and/or treatment. 11. There are potential risks to the use of Telehealth, including but not limited to the risks described in this Telehealth Consent. 12. Your Provider(s) have discussed the use of Telehealth and the Service with you, including the benefits and risks of such and you have provided oral consent to your Provider(s) for the use of Telehealth and the Service. 15. You understand that it is your duty to provide your Provider(s) truthful, accurate and complete information, including all relevant information regarding care that you may have received or may be receiving from other healthcare providers outside of the Service. 14. You understand that each of your Provider(s) may determine in his or sole discretion that your condition is not suitable for diagnosis and/or treatment using the Service, and that you may need to seek medical care and treatment a specialist or other healthcare provider, outside of the Service. 15. You understand that you are fully responsible for payment for all services provided by Provider(s) or through use of the Service and that you may not be able to use third-party insurance. 16. You represent that (a) you have read this Telehealth Consent carefully, (b) you understand the risks and benefits of the Service and the use of Telehealth in the medical care and treatment provided to you by Provider(s) using the Service, and (c) you have the legal capacity and authority to provide this consent for yourself and/or the minor for which you are consenting under applicable federal and state laws, including laws relating to the age of [de-identified] and/or parental/guardian consent.    17. You give your informed consent to the use of Telehealth by Provider(s) using the Service under the terms described in the Terms of Service and this Telehealth Consent. The patient was read the following statement and has consented to the visit as of 3/19/21. The patient has been scheduled for their first telehealth visit on 04/07/2021 with Dr. Trisha Bush.

## 2021-03-23 ENCOUNTER — HOSPITAL ENCOUNTER (OUTPATIENT)
Age: 56
Discharge: HOME | End: 2021-03-23
Payer: MEDICARE

## 2021-03-23 DIAGNOSIS — E78.2 MIXED HYPERLIPIDEMIA: ICD-10-CM

## 2021-03-23 DIAGNOSIS — E11.69 DIABETES MELLITUS TYPE 2 IN OBESE (HCC): ICD-10-CM

## 2021-03-23 DIAGNOSIS — Z01.818 PREOPERATIVE CLEARANCE: ICD-10-CM

## 2021-03-23 DIAGNOSIS — E66.01 MORBID OBESITY WITH BMI OF 45.0-49.9, ADULT (HCC): ICD-10-CM

## 2021-03-23 DIAGNOSIS — I10 ESSENTIAL HYPERTENSION: ICD-10-CM

## 2021-03-23 DIAGNOSIS — E66.9 DIABETES MELLITUS TYPE 2 IN OBESE (HCC): ICD-10-CM

## 2021-03-23 LAB
A/G RATIO: 0.9 (ref 1.1–2.2)
ALBUMIN SERPL-MCNC: 3.9 G/DL (ref 3.4–5)
ALP BLD-CCNC: 47 U/L (ref 40–129)
ALT SERPL-CCNC: 21 U/L (ref 10–40)
ANION GAP SERPL CALCULATED.3IONS-SCNC: 9 MMOL/L (ref 3–16)
AST SERPL-CCNC: 18 U/L (ref 15–37)
BASOPHILS ABSOLUTE: 0.1 K/UL (ref 0–0.2)
BASOPHILS RELATIVE PERCENT: 1.2 %
BILIRUB SERPL-MCNC: 0.4 MG/DL (ref 0–1)
BUN BLDV-MCNC: 12 MG/DL (ref 7–20)
CALCIUM SERPL-MCNC: 8.9 MG/DL (ref 8.3–10.6)
CHLORIDE BLD-SCNC: 101 MMOL/L (ref 99–110)
CHOLESTEROL, TOTAL: 167 MG/DL (ref 0–199)
CO2: 25 MMOL/L (ref 21–32)
CREAT SERPL-MCNC: 0.9 MG/DL (ref 0.9–1.3)
EOSINOPHILS ABSOLUTE: 0.1 K/UL (ref 0–0.6)
EOSINOPHILS RELATIVE PERCENT: 2.6 %
FOLATE: 10.52 NG/ML (ref 4.78–24.2)
GFR AFRICAN AMERICAN: >60
GFR NON-AFRICAN AMERICAN: >60
GLOBULIN: 4.3 G/DL
GLUCOSE BLD-MCNC: 132 MG/DL (ref 70–99)
HCT VFR BLD CALC: 40.6 % (ref 40.5–52.5)
HDLC SERPL-MCNC: 36 MG/DL (ref 40–60)
HEMOGLOBIN: 13.8 G/DL (ref 13.5–17.5)
INR BLD: 1.08 (ref 0.86–1.14)
IRON SATURATION: 36 % (ref 20–50)
IRON: 91 UG/DL (ref 59–158)
LDL CHOLESTEROL CALCULATED: 105 MG/DL
LYMPHOCYTES ABSOLUTE: 2 K/UL (ref 1–5.1)
LYMPHOCYTES RELATIVE PERCENT: 35.6 %
MCH RBC QN AUTO: 30.9 PG (ref 26–34)
MCHC RBC AUTO-ENTMCNC: 34.1 G/DL (ref 31–36)
MCV RBC AUTO: 90.6 FL (ref 80–100)
MONOCYTES ABSOLUTE: 0.3 K/UL (ref 0–1.3)
MONOCYTES RELATIVE PERCENT: 5.6 %
NEUTROPHILS ABSOLUTE: 3.2 K/UL (ref 1.7–7.7)
NEUTROPHILS RELATIVE PERCENT: 55 %
PDW BLD-RTO: 12.9 % (ref 12.4–15.4)
PLATELET # BLD: 243 K/UL (ref 135–450)
PMV BLD AUTO: 7.7 FL (ref 5–10.5)
POTASSIUM SERPL-SCNC: 4.3 MMOL/L (ref 3.5–5.1)
PROTHROMBIN TIME: 12.5 SEC (ref 10–13.2)
RBC # BLD: 4.49 M/UL (ref 4.2–5.9)
SODIUM BLD-SCNC: 135 MMOL/L (ref 136–145)
T4 FREE: 1 NG/DL (ref 0.9–1.8)
TOTAL IRON BINDING CAPACITY: 250 UG/DL (ref 260–445)
TOTAL PROTEIN: 8.2 G/DL (ref 6.4–8.2)
TRIGL SERPL-MCNC: 129 MG/DL (ref 0–150)
TSH REFLEX: 9.49 UIU/ML (ref 0.27–4.2)
VITAMIN B-12: 597 PG/ML (ref 211–911)
VITAMIN D 25-HYDROXY: 16.7 NG/ML
VLDLC SERPL CALC-MCNC: 26 MG/DL
WBC # BLD: 5.7 K/UL (ref 4–11)

## 2021-03-23 PROCEDURE — 85610 PROTHROMBIN TIME: CPT

## 2021-03-23 PROCEDURE — 82746 ASSAY OF FOLIC ACID SERUM: CPT

## 2021-03-23 PROCEDURE — 80061 LIPID PANEL: CPT

## 2021-03-23 PROCEDURE — 84590 ASSAY OF VITAMIN A: CPT

## 2021-03-23 PROCEDURE — 83550 IRON BINDING TEST: CPT

## 2021-03-23 PROCEDURE — 80053 COMPREHEN METABOLIC PANEL: CPT

## 2021-03-23 PROCEDURE — 82306 VITAMIN D 25 HYDROXY: CPT

## 2021-03-23 PROCEDURE — 82607 VITAMIN B-12: CPT

## 2021-03-23 PROCEDURE — 84443 ASSAY THYROID STIM HORMONE: CPT

## 2021-03-23 PROCEDURE — 83036 HEMOGLOBIN GLYCOSYLATED A1C: CPT

## 2021-03-23 PROCEDURE — 85025 COMPLETE CBC W/AUTO DIFF WBC: CPT

## 2021-03-23 PROCEDURE — 83540 ASSAY OF IRON: CPT

## 2021-03-23 PROCEDURE — 84439 ASSAY OF FREE THYROXINE: CPT

## 2021-03-23 PROCEDURE — 84446 ASSAY OF VITAMIN E: CPT

## 2021-03-23 PROCEDURE — 84425 ASSAY OF VITAMIN B-1: CPT

## 2021-03-23 PROCEDURE — 36415 COLL VENOUS BLD VENIPUNCTURE: CPT

## 2021-03-23 NOTE — PROGRESS NOTES
[address] Office consultation Note  3/24/2021   Ref by Dr Belkis Roberson  Subjective:  Mr. Bronwyn Martinez presents today for cardiac clearance (gastric sleeve)     HPI: Elen Simon. 60 y/o presents today, referred by Dr. Jane Blanchard, for cardiac clearance for gastric sleeve surgery. PMH:  Stroke, CAD has been ruled out in past with cath and stress test. Controlled DM 2 with diet , Hypothyroid, HLD and HTN. Today he reports his surgery is not scheduled yet, he is going through all the steps required. Smoke free x 1 year. He does smoke medical marijuana. He states he has no significant deficits from his stroke, other than he feels numbing sensations off an on on left side of face and arm/hand area. He states he is trying to work hard on getting the weight off this time. He states he has pretty much given up soda all together. He states he has not had to been on diabetic medication for 5 years. He states his A1C is under 7  He states he does get SOB when trying to really exert or go up stairs. He states this is not new and attributes this to his weight. He reports he had a negative angiogram at [facility] in 2010. He had a stress test in 2017 in [place] that was negative. He states he did have Covid in November 2020. Review of Systems:         12 point ROS negative in all areas as listed below except as in [place]  Constitutional, EENT, pulmonary, GI, , Musculoskeletal, skin, neurological, hematological, endocrine, Psychiatric    Reviewed past medical history, social, and family history. Stopped smoking 1 year ago.  1.5 PPD 35 years  Smokes medical Marijuana  No alcohol   Father - heart disease - Heart failure  (passed age 80)   Mother - passed of CA   Past Medical History:   Diagnosis Date    Bipolar disorder ([address]     CAD (coronary artery disease) 8/25/10    Cerebral artery occlusion with cerebral infarction ([address] 2011    L hand less fine motor movements    Chronic back pain     Controlled type 2 diabetes mellitus with diabetic polyneuropathy, without long-term current use of insulin ([facility])     Controlled type 2 diabetes mellitus with diabetic polyneuropathy, without long-term current use of insulin ([address]     Depression 8/25/10    Displacement of cervical intervertebral disc without myelopathy     Gastroesophageal reflux 8/25/10    Hypercholesteremia     Hyperlipidemia 8/25/10    Hypertension     benign essential    Hypogonadism     Hypothyroidism     acquired    Morbid obesity with BMI of 40.0-44.9, adult ([address]     Morbid obesity with BMI of 40.0-44.9, adult ([address]     Osteoarthritis     Osteoarthritis of spine 8/25/10     Past Surgical History:   Procedure Laterality Date    CERVICAL FUSION      CHOLECYSTECTOMY      COLONOSCOPY N/A 6/12/2019    COLONOSCOPY POLYPECTOMY SNARE/COLD BIOPSY performed by Cira Buchanan MD at [address] [place] [address] Bilateral 8/13/2019    BILATERAL LUMBAR THREE TRANSFORAMINAL EPIDURAL STEROID INJECTION SITE CONFIRMED BY FLUOROSCOPY performed by Stephen Real MD at [place] [address]. Bilateral 9/20/2019    BILATERAL L3 TRANSFORAMINAL EPIDURAL STEROID INJECTION WITH FLUOROSCOPY performed by Stephen Real MD at [facility]       Objective:   BP (!) 158/68   Pulse 74   Ht 5' 8\" (1.727 m)   Wt 292 lb (132.5 kg)   SpO2 97%   BMI 44.40 kg/m²     Wt Readings from Last 3 Encounters:   03/24/21 292 lb (132.5 kg)   03/18/21 289 lb 3.2 oz (131.2 kg)   03/09/21 294 lb (133.4 kg)       Physical Exam:  General: No Respiratory distress, appears well developed and well nourished.    Eyes:  Sclera nonicteric  Nose/Sinuses:  negative findings: nose shows no deformity, asymmetry, or inflammation, nasal mucosa normal, septum midline with no perforation or bleeding  Back:  no pain to palpation  Joint:  no active joint inflammation Musculoskeletal:  negative  Skin:  Warm and dry  Neck:  Negative for JVD and Carotid Bruits. Chest:  Clear to auscultation, respiration easy  Cardiovascular:  RRR, S1S2 normal, no murmur, no rub or thrill. Abdomen:  Soft normal liver and spleen  Extremities:   No edema, clubbing, cyanosis,  Pulses: pedal pulses are normal.  Neuro: intact    Medications:   Outpatient Encounter Medications as of 3/24/2021   Medication Sig Dispense Refill    levothyroxine (SYNTHROID) 100 MCG tablet Take 1 tablet by mouth daily 90 tablet 1    Blood Pressure KIT 1 kit by Does not apply route 2 times daily 1 kit 0    lisinopril (PRINIVIL;ZESTRIL) 30 MG tablet TAKE 1 TABLET BY MOUTH DAILY 90 tablet 1    pregabalin (LYRICA) 150 MG capsule TAKE 1 CAPSULE BY MOUTH TWICE DAILY 60 capsule 0    atorvastatin (LIPITOR) 10 MG tablet TAKE 1 TABLET BY MOUTH DAILY 90 tablet 1    medical marijuana Take by mouth as needed.       aspirin 325 MG tablet Take 325 mg by mouth daily      sildenafil (VIAGRA) 100 MG tablet Take 1 tablet by mouth as needed for Erectile Dysfunction (Patient not taking: Reported on 3/24/2021) 30 tablet 3     Facility-Administered Encounter Medications as of 3/24/2021   Medication Dose Route Frequency Provider Last Rate Last Admin    sodium hyaluronate (viscosup) injection 40 mg  40 mg Intra-articular Once Joseph Valdes MD            Lab Data:  CBC:   Recent Labs     03/23/21  0825   WBC 5.7   HGB 13.8   HCT 40.6   MCV 90.6        BMP:   Recent Labs     03/23/21  0824   *   K 4.3      CO2 25   BUN 12   CREATININE 0.9     LIVER PROFILE:   Recent Labs     03/23/21  0824   AST 18   ALT 21   BILITOT 0.4   ALKPHOS 47     LIPID:   Lab Results   Component Value Date    CHOL 167 03/23/2021    CHOL 158 03/03/2021    CHOL 222 (H) 09/14/2020     Lab Results   Component Value Date    TRIG 129 03/23/2021    TRIG 131 03/03/2021    TRIG 103 09/14/2020     Lab Results   Component Value Date    HDL 36 (L) 03/23/2021 HDL 36 (L) 03/03/2021    HDL 41 09/14/2020     Lab Results   Component Value Date    LDLCALC 105 (H) 03/23/2021    LDLCALC 96 03/03/2021    LDLCALC 160 (H) 09/14/2020     Lab Results   Component Value Date    LABVLDL 26 03/23/2021    LABVLDL 26 03/03/2021    LABVLDL 21 09/14/2020     Lab Results   Component Value Date    CHOLHDLRATIO 8.1 (H) 05/19/2011     PT/INR:   Recent Labs     03/23/21  0825   PROTIME 12.5   INR 1.08     A1C:   Lab Results   Component Value Date    LABA1C 6.7 03/23/2021     BNP:  No results for input(s): BNP in the last 72 hours. IMAGING:   EKG 3.24.21  NSR No chamber enlargement no ischemia or infarction no arrhythmia  Labs in epic reviewed and he is still slightly hypothyroid  Assessment:  1. Preop cardiovascular exam    2. Essential hypertension    3. Hypercholesterolemia             Plan:  1. Patient is low cardiovascular risk for non cardiac surgery  2. Would recommend discussing with your doctor to increase/adjust your levothyroxine  3. His lipids are satisfactory and he is on statin  4. Continue asa for previous CVA but can be stopped 1 week preop        This note was scribed in the presence of Roland Nunez MD by Khang Brenner RN.   I, Dr. Polly Patrick, personally performed the services described in this documentation, as scribed by the above signed scribe in my presence. It is both accurate and complete to my knowledge. I agree with the details independently gathered by the clinical support staff, while the remaining scribed note accurately describes my personal service to the patient.         Zuleika Su MD 3/24/2021 9:50 AM

## 2021-03-24 ENCOUNTER — OFFICE VISIT (OUTPATIENT)
Dept: CARDIOLOGY CLINIC | Age: 56
End: 2021-03-24
Payer: MEDICARE

## 2021-03-24 VITALS
HEIGHT: 68 IN | WEIGHT: 292 LBS | HEART RATE: 74 BPM | OXYGEN SATURATION: 97 % | BODY MASS INDEX: 44.25 KG/M2 | DIASTOLIC BLOOD PRESSURE: 68 MMHG | SYSTOLIC BLOOD PRESSURE: 158 MMHG

## 2021-03-24 DIAGNOSIS — I10 ESSENTIAL HYPERTENSION: Primary | ICD-10-CM

## 2021-03-24 DIAGNOSIS — E11.42 DIABETIC POLYNEUROPATHY ASSOCIATED WITH TYPE 2 DIABETES MELLITUS (HCC): ICD-10-CM

## 2021-03-24 DIAGNOSIS — Z01.810 PREOP CARDIOVASCULAR EXAM: ICD-10-CM

## 2021-03-24 DIAGNOSIS — E78.00 HYPERCHOLESTEROLEMIA: ICD-10-CM

## 2021-03-24 LAB
ESTIMATED AVERAGE GLUCOSE: 145.6 MG/DL
HBA1C MFR BLD: 6.7 %

## 2021-03-24 PROCEDURE — G8484 FLU IMMUNIZE NO ADMIN: HCPCS | Performed by: INTERNAL MEDICINE

## 2021-03-24 PROCEDURE — 93000 ELECTROCARDIOGRAM COMPLETE: CPT | Performed by: INTERNAL MEDICINE

## 2021-03-24 PROCEDURE — G8417 CALC BMI ABV UP PARAM F/U: HCPCS | Performed by: INTERNAL MEDICINE

## 2021-03-24 PROCEDURE — 99204 OFFICE O/P NEW MOD 45 MIN: CPT | Performed by: INTERNAL MEDICINE

## 2021-03-24 PROCEDURE — G8427 DOCREV CUR MEDS BY ELIG CLIN: HCPCS | Performed by: INTERNAL MEDICINE

## 2021-03-24 RX ORDER — PREGABALIN 150 MG/1
CAPSULE ORAL
Qty: 60 CAPSULE | Refills: 0 | Status: SHIPPED | OUTPATIENT
Start: 2021-03-24 | End: 2021-05-25 | Stop reason: SDUPTHER

## 2021-03-24 NOTE — TELEPHONE ENCOUNTER
Future Appointments   Date Time Provider Clair Annie   4/7/2021  1:00 PM Gavin Lancaster MD AND PULM [facility]   4/14/2021  9:30 AM Kirsty Verduzco MD FF Ortho [facility]   4/15/2021  1:00 PM Alexandria Reich MD [facility]   6/15/2021  9:00 [facility] 3/9/2021

## 2021-03-24 NOTE — LETTER
[address] Cardiology - [address] [facility] [address] [address]  Phone: [phone]  Fax: [phone]    Mary Middleton MD        March 24, 2021     Jamir Soria.  [address] [address]      To Whom it May Concern:    Patient is low cardiovascular risk for non cardiac procedure. If you have any questions or concerns, please don't hesitate to call.     Sincerely,        Mary Middleton MD

## 2021-03-24 NOTE — PATIENT INSTRUCTIONS
Plan:  1. Patient is low cardiovascular risk for non cardiac surgery  2.  Would recommend discussing with your doctor to increase/adjust your levothyroxine

## 2021-03-25 LAB
ALPHA-TOCOPHEROL: 9.7 MG/L (ref 5.5–18)
GAMMA-TOCOPHEROL: 2 MG/L (ref 0–6)
RETINYL PALMITATE: <0.02 MG/L (ref 0–0.1)
VITAMIN A LEVEL: 0.46 MG/L (ref 0.3–1.2)
VITAMIN A, INTERP: NORMAL

## 2021-03-26 LAB — VITAMIN B1 WHOLE BLOOD: 121 NMOL/L (ref 70–180)

## 2021-04-01 ENCOUNTER — TELEPHONE (OUTPATIENT)
Dept: FAMILY MEDICINE CLINIC | Age: 56
End: 2021-04-01

## 2021-04-01 NOTE — TELEPHONE ENCOUNTER
Please print the letter I have written for the patient and fax to [facility], Dr. Allison Zhao office. Fax # [phone]. Thank you.

## 2021-04-07 ENCOUNTER — VIRTUAL VISIT (OUTPATIENT)
Dept: PULMONOLOGY | Age: 56
End: 2021-04-07
Payer: MEDICARE

## 2021-04-07 DIAGNOSIS — G25.81 RLS (RESTLESS LEGS SYNDROME): ICD-10-CM

## 2021-04-07 DIAGNOSIS — E66.01 MORBID OBESITY (HCC): ICD-10-CM

## 2021-04-07 DIAGNOSIS — G47.33 OSA (OBSTRUCTIVE SLEEP APNEA): Primary | ICD-10-CM

## 2021-04-07 PROCEDURE — 3017F COLORECTAL CA SCREEN DOC REV: CPT | Performed by: INTERNAL MEDICINE

## 2021-04-07 PROCEDURE — 99203 OFFICE O/P NEW LOW 30 MIN: CPT | Performed by: INTERNAL MEDICINE

## 2021-04-07 PROCEDURE — G8427 DOCREV CUR MEDS BY ELIG CLIN: HCPCS | Performed by: INTERNAL MEDICINE

## 2021-04-07 ASSESSMENT — SLEEP AND FATIGUE QUESTIONNAIRES
HOW LIKELY ARE YOU TO NOD OFF OR FALL ASLEEP WHILE WATCHING TV: 3
HOW LIKELY ARE YOU TO NOD OFF OR FALL ASLEEP IN A CAR, WHILE STOPPED FOR A FEW MINUTES IN TRAFFIC: 0
HOW LIKELY ARE YOU TO NOD OFF OR FALL ASLEEP WHILE SITTING QUIETLY AFTER LUNCH WITHOUT ALCOHOL: 0
HOW LIKELY ARE YOU TO NOD OFF OR FALL ASLEEP WHILE SITTING AND TALKING TO SOMEONE: 0
ESS TOTAL SCORE: 11
HOW LIKELY ARE YOU TO NOD OFF OR FALL ASLEEP WHILE SITTING INACTIVE IN A PUBLIC PLACE: 3
HOW LIKELY ARE YOU TO NOD OFF OR FALL ASLEEP WHILE SITTING AND READING: 1

## 2021-04-07 NOTE — PROGRESS NOTES
with diet changes, he has lost almost 60 pounds over the last year. He was on insulin, no longer on it. There was no other change in his medical or surgical history, his the rest of his ROS was negative. His medications and allergies were reviewed. I have personally reviewed the patient's past medical, surgical, family and personal history. Changes were documented as needed. I have personally reviewed available radiology images. \"x\" indicates this is positive or the patient agrees.    [x] Loud Snoring [] Nighmares   [] Frequent awakenings at night [] Teeth grinding during sleep   [x] Choking for breath at night: rare [] Morning headaches   [x] Gasping during sleep: rare [] Morning dry mouth   [] I've been told that I stop breathing when asleep [] Sleep walking   [x] Restless sleep [] Sleep terrors   [x] Awaken un-refreshed: some days [] Tongue biting during sleep   [] Waking up to urinate [] Bed wetting   [] Crawling feelings in legs when trying to sleep [] Acting out dreams   [] Leg kicking during sleep [] Feeling paralyzed when falling asleep or waking up    [] Leg cramps during sleep [] Dream-like images when falling asleep   [] Trouble falling asleep at night [] Sudden weakness when laughing   [] Trouble staying asleep at night [] Uncontrollable daytime sleep attacks   [] Racing thoughts when trying to sleep [] Falling asleep unexpectedly   [] Increased muscle tension when trying to sleep [] Falling asleep at work   [] Fear of being unable to sleep [] Falling asleep at school   [] Fear of being unable to fall back asleep after wakening at night [] Falling asleep while driving   [] Laying in bed worrying when trying to sleep [] Recent change in sleep schedule   [] Waking up too early in the morning [] Shift work interfering with sleep   [] Sleep talking [] I use sleeping pills to help me sleep   [] Sweating a lot at night [] I use alcohol to help me sleep   [] Waking up with heartburn [] Pain interfering with sleep   [] Waking with reflux []        Christine Sleepiness Scale:    Sleep Medicine 4/7/2021   Sitting and reading 1   Watching TV 3   Sitting, inactive in a public place (e.g. a theatre or a meeting) 3   As a passenger in a car for an hour without a break 3   Lying down to rest in the afternoon when circumstances permit 1   Sitting and talking to someone 0   Sitting quietly after a lunch without alcohol 0   In a car, while stopped for a few minutes in traffic 0   Total score 11       PAST MEDICAL HISTORY:  Past Medical History:   Diagnosis Date    Bipolar disorder ([address]     CAD (coronary artery disease) 8/25/10    Cerebral artery occlusion with cerebral infarction ([address] 2011    L hand less fine motor movements    Chronic back pain     Controlled type 2 diabetes mellitus with diabetic polyneuropathy, without long-term current use of insulin ([address]     Controlled type 2 diabetes mellitus with diabetic polyneuropathy, without long-term current use of insulin ([address]     Depression 8/25/10    Displacement of cervical intervertebral disc without myelopathy     Gastroesophageal reflux 8/25/10    Hypercholesteremia     Hyperlipidemia 8/25/10    Hypertension     benign essential    Hypogonadism     Hypothyroidism     acquired    Morbid obesity with BMI of 40.0-44.9, adult ([address]     Morbid obesity with BMI of 40.0-44.9, adult ([address]     Osteoarthritis     Osteoarthritis of spine 8/25/10       PAST SURGICAL HISTORY:  Past Surgical History:   Procedure Laterality Date    CERVICAL FUSION      CHOLECYSTECTOMY      COLONOSCOPY N/A 06/12/2019    COLONOSCOPY POLYPECTOMY SNARE/COLD BIOPSY performed by Jd Rai MD at [facility] INJECTION Bilateral 08/13/2019    BILATERAL LUMBAR THREE TRANSFORAMINAL EPIDURAL STEROID INJECTION SITE CONFIRMED BY FLUOROSCOPY performed by Bailee Johnson MD at [facility]     tablet 1    aspirin 325 MG tablet Take 325 mg by mouth daily      lisinopril (PRINIVIL;ZESTRIL) 30 MG tablet TAKE 1 TABLET BY MOUTH DAILY 90 tablet 1    atorvastatin (LIPITOR) 10 MG tablet TAKE 1 TABLET BY MOUTH DAILY 90 tablet 1    sildenafil (VIAGRA) 100 MG tablet Take 1 tablet by mouth as needed for Erectile Dysfunction 30 tablet 3    medical marijuana Take by mouth as needed.  Blood Pressure KIT 1 kit by Does not apply route 2 times daily 1 kit 0     Current Facility-Administered Medications   Medication Dose Route Frequency Provider Last Rate Last Admin    sodium hyaluronate (viscosup) injection 40 mg  40 mg Intra-articular Once Jarod Cedillo MD         Data Reviewed:   CBC and Renal reviewed  Last CBC  Lab Results   Component Value Date    WBC 5.7 03/23/2021    RBC 4.49 03/23/2021    HGB 13.8 03/23/2021    MCV 90.6 03/23/2021     03/23/2021     Last Renal  Lab Results   Component Value Date     03/23/2021    K 4.3 03/23/2021     03/23/2021    CO2 25 03/23/2021    CO2 25 09/14/2020    CO2 25 09/27/2019    BUN 12 03/23/2021    CREATININE 0.9 03/23/2021    GLUCOSE 132 03/23/2021    GLUCOSE 89 05/19/2011    CALCIUM 8.9 03/23/2021     Chest imaging reports were reviewed and imaging was reviewed by me    Assessment:     · SHANNON  · RLS  · Orbit obesity    Plan:      1. SHANNON (obstructive sleep apnea)  Moderate suspicion of SHANNON given his symptoms and body habitus. No bed partner at present. Would recommend a home sleep study  - Home Sleep Study; Future    2. RLS (restless legs syndrome)  The patient mentions symptoms are decreased as he is on Lyrica  - Home Sleep Study; Future    3. Morbid obesity ([address]  Being evaluated by Dr. Anita Reyes, await sleep study results  - Home Sleep Study; Future    4. Prophylaxis  Has received Pneumovax, recommend annual flu shot and COVID-19 vaccination.     RTC after sleep study completed    Meenu Grier., was evaluated through a synchronous (real-time) audio-video encounter. The patient (or guardian if applicable) is aware that this is a billable service. Verbal consent to proceed has been obtained within the past 12 months. The visit was conducted pursuant to the emergency declaration under the [facility], [address] [place] [address] authority and the [employer] General Act. Patient identification was verified, and a caregiver was present when appropriate. The patient was located in a state where the provider was credentialed to provide care. Total time spent for this encounter: Not billed by time    --Hanna Harris MD on 4/11/2021 at 1:26 AM    An electronic signature was used to authenticate this note.

## 2021-04-07 NOTE — PROGRESS NOTES
MA Communication:   The following orders are received by verbal communication from Alejandra De Guzman MD    Orders include:  HST (sleep center to contact pt)       VV fu scheduled 5/6/21

## 2021-04-07 NOTE — PATIENT INSTRUCTIONS
Remember to bring a list of pulmonary medications and any CPAP or BiPAP machines to your next appointment with the office. Please keep all of your future appointments scheduled by [facility] [address], [facility] Pulmonary office. Out of respect for other patients and providers, you may be asked to reschedule your appointment if you arrive later than your scheduled appointment time. Appointments cancelled less than 24hrs in advance will be considered a no show. Patients with three missed appointments within 1 year or four missed appointments within 2 years can be dismissed from the practice. Please be aware that our physicians are required to work in the Intensive Care Unit at [facility].  Your appointment may need to be rescheduled if they are designated to work during your appointment time. You may receive a survey regarding the care you received during your visit. Your input is valuable to us. We encourage you to complete and return your survey. We hope you will choose us in the future for your healthcare needs. Pt instructed of all future appointment dates & times, including radiology, labs, procedures & referrals. If procedures were scheduled preparation instructions provided. Instructions on future appointments with [facility] were given. Remember to bring a list of pulmonary medications and any CPAP or BiPAP machines to your next appointment with the office. Please keep all of your future appointments scheduled by [facility] [address], [facility] office. Out of respect for other patients and providers, you may be asked to reschedule your appointment if you arrive later than your scheduled appointment time. Appointments cancelled less than 24hrs in advance will be considered a no show.  Patients with three missed appointments within 1 year or four missed appointments within 2 years can be dismissed

## 2021-04-14 ENCOUNTER — OFFICE VISIT (OUTPATIENT)
Dept: ORTHOPEDIC SURGERY | Age: 56
End: 2021-04-14
Payer: MEDICARE

## 2021-04-14 VITALS
SYSTOLIC BLOOD PRESSURE: 149 MMHG | BODY MASS INDEX: 44.25 KG/M2 | HEART RATE: 68 BPM | DIASTOLIC BLOOD PRESSURE: 90 MMHG | RESPIRATION RATE: 14 BRPM | HEIGHT: 68 IN | WEIGHT: 292 LBS

## 2021-04-14 DIAGNOSIS — M65.30 TRIGGER FINGER, ACQUIRED: Primary | ICD-10-CM

## 2021-04-14 PROCEDURE — 99214 OFFICE O/P EST MOD 30 MIN: CPT | Performed by: ORTHOPAEDIC SURGERY

## 2021-04-14 PROCEDURE — G8427 DOCREV CUR MEDS BY ELIG CLIN: HCPCS | Performed by: ORTHOPAEDIC SURGERY

## 2021-04-14 PROCEDURE — G8417 CALC BMI ABV UP PARAM F/U: HCPCS | Performed by: ORTHOPAEDIC SURGERY

## 2021-04-14 NOTE — LETTER
Physician Prophylaxis Orders - SCIP Protocols      Pre-Operative Antibiotic Order:    Allergies   Allergen Reactions    Mobic [Meloxicam]      Upset stomach    Morphine Sulfate Itching     Pt is only allergic to Morphine ER, not IVP Morphine. [x]  ----  No Antibiotic Ordered       []  ----  Give the following Antibiotic within 1 hour prior to start time:         Ancef 1 gram IV if patient is less than 200 pounds    or       Ancef 2 grams IV if patient is greater than 200 pounds    or      Vancomycin 1 gram IV (over 1 hour) if patient is allergic to           PENICILLINS or CEFALOSPORINS            Procedure: right Index Finger and Middle Finger Trigger Finger Release     Patient: Darin Danielson.   :    1965    Physician Signature:     Date: 21  Time: 9:51 AM

## 2021-04-14 NOTE — Clinical Note
Dear  Lea Adams, ,    Thank you very much for your referral or . Ashtyn Nunez. to me for evaluation and treatment of his Hand & Wrist condition. I appreciate your confidence in me and thank you for allowing me the opportunity to care for your patients. If I can be of any further assistance to you on this or any other patient, please do not hesitate to contact me. Sincerely,    Karlos Araiza.  Janett Turner MD

## 2021-04-14 NOTE — LETTER
CONSENT TO OPERATION  AND/OR OTHER PROCEDURE(S)          PATIENT : Erma Garcia. YOB: 1965      DATE : 4/14/21          1. I request and consent that Dr. Poncho Erwin. Scarlett Marks,  and/or his associates or assistants perform an operation and/or procedures on the above patient at  [place] [address], to treat the condition(s) which appear indicated by the diagnostic studies already performed. I have been told that in general terms the nature, purpose and reasonable expectations of the operation and/or procedure(s) are:      Right Index Finger and Middle Finger Trigger Finger Release      2. It has been explained to me by the informing physician that during the course of the operation and/or procedure(s) unforeseen conditions may be revealed that necessitate an extension of the original operation and/or procedure(s) or different operation and/or procedures than those set forth in Paragraph 1. I therefore authorize and request that my physician and/or his associates or assistants perform such operations and/or procedures as are necessary and desirable in the exercise of professional judgment. The authority granted under this Paragraph 2 shall extend to all conditions that require treatment and are known to my physician at the time the operation is commenced. 3. I have been made aware by the informing physician of certain risks and consequences that are associated with the operation and/or procedure(s) described in Paragraph 1, the reasonable alternative methods or treatment, the possible consequences, the possibility that the operation and/or procedure(s) may be unsuccessful and the possibility of complications.   I understand the reasonably known risks to be:      - Bleeding  - Infection  - Poor Healing  - Possible Damage to Nerve, Vessel, Tendon/Muscle or Bone  - Need for further Treatment/Surgery  - Stiffness  - Pain  - Residual or Recurrent Symptoms  - Anesthetic and/or Medical Risks  - We have discussed the specific limitations and risks of hospital and/or office based treatment at this time due to the COVID-19 pandemic                I have been counseled about the risks of aamir Covid-19 in the sharon-operative and post-operative periods related to this procedure. I have been made aware that aamir Covid-19 around the time of a surgical procedure may worsen my prognosis for recovering from the virus and lend to a higher morbidity and or mortality risk. With this knowledge, I have requested to proceed with the procedure as scheduled. 4. I have also been informed by the informing physician that there are other risks from both known and unknown causes that are attendant to the performance of any surgical procedure. I am aware that the practice of medicine and surgery is not an exact science, and that no guarantees have been made to me concerning the results of the operation and/or procedure(s). 5. I   CONSENT / REFUSE CONSENT  (strike the phrase that does not apply) to the taking of photographs before, during and/or after the operation or procedure for scientific/educational purposes. 6. I consent to the administration of anesthesia and to the use of such anesthetics as may be deemed advisable by the anesthesiologist who has been engaged by me or my physician.     7. I certify that I have read and understand the above consent to operation and/or other procedure(s); that the explanations therein referred to were made to me by the informing physician in advance of my signing this consent; that all blanks or statements requiring insertion or completion were filled in and inapplicable paragraphs, if any, were stricken before I signed; and that all questions asked by me about the operation and/or procedure(s) which I have consented to have been fully answered in a satisfactory manner.                                 _______________________           4/14/21 Witness     Signature Of Patient         Date        Thom Nation. Aris                                                 Informing Physician                                           Signature of Informing Physician                              If patient is unable to sign or is a minor, complete one of the following:    (A)  Patient is a minor   years of age. (B)  Patient is unable to sign because: The undersigned represents that he or she is duly authorized to execute this consent for and on behalf of the above named patient. Witness               o  Parent  o  Guardian   o  Spouse       o  Other (specify)                                                          Patient Name: Vikas Ram Patient YOB: 1965  Dr. Florence Morrell' Return To Work Policy  Regarding your ability to return to work after surgery or injury, Dr. Florence Morrell will not state that any patient is off of work or cannot work at all. He will place you on restrictions after your surgical procedure or injury. This means that you will be allowed to return to work the day after your office visit or surgery with restrictions. Depending on the details of your particular situation, Dr. lForence Morrell may state that you will have either light use or no use of your hand for a specific number of weeks. It is your obligation to communicate with your employer regarding your restrictions. It is your employer's decision as to whether they will accommodate your restrictions (i.e. allow you to come to work in your restricted capacity) or to not allow you to return to work under your restrictions. Dr. Florence Morrell does not participate in making this decision and cannot influence your employer regarding their decision. If you do not communicate your restrictions to your employer, or if you do not present to work as you are scheduled to, Dr. Florence Morrell will not provide an 'excuse' to explain your absence.   A doctors note,

## 2021-04-15 ENCOUNTER — OFFICE VISIT (OUTPATIENT)
Dept: BARIATRICS/WEIGHT MGMT | Age: 56
End: 2021-04-15
Payer: MEDICARE

## 2021-04-15 VITALS
SYSTOLIC BLOOD PRESSURE: 105 MMHG | RESPIRATION RATE: 18 BRPM | OXYGEN SATURATION: 98 % | HEIGHT: 67 IN | HEART RATE: 65 BPM | WEIGHT: 282.4 LBS | DIASTOLIC BLOOD PRESSURE: 64 MMHG | BODY MASS INDEX: 44.32 KG/M2

## 2021-04-15 DIAGNOSIS — E11.69 DIABETES MELLITUS TYPE 2 IN OBESE (HCC): Primary | ICD-10-CM

## 2021-04-15 DIAGNOSIS — E78.2 MIXED HYPERLIPIDEMIA: ICD-10-CM

## 2021-04-15 DIAGNOSIS — E66.9 DIABETES MELLITUS TYPE 2 IN OBESE (HCC): Primary | ICD-10-CM

## 2021-04-15 DIAGNOSIS — E66.01 MORBID OBESITY WITH BMI OF 40.0-44.9, ADULT (HCC): ICD-10-CM

## 2021-04-15 DIAGNOSIS — I10 ESSENTIAL HYPERTENSION: ICD-10-CM

## 2021-04-15 PROCEDURE — 99214 OFFICE O/P EST MOD 30 MIN: CPT | Performed by: SURGERY

## 2021-04-15 PROCEDURE — 1036F TOBACCO NON-USER: CPT | Performed by: SURGERY

## 2021-04-15 PROCEDURE — 3044F HG A1C LEVEL LT 7.0%: CPT | Performed by: SURGERY

## 2021-04-15 PROCEDURE — 2022F DILAT RTA XM EVC RTNOPTHY: CPT | Performed by: SURGERY

## 2021-04-15 PROCEDURE — G8417 CALC BMI ABV UP PARAM F/U: HCPCS | Performed by: SURGERY

## 2021-04-15 PROCEDURE — G8427 DOCREV CUR MEDS BY ELIG CLIN: HCPCS | Performed by: SURGERY

## 2021-04-15 PROCEDURE — 3017F COLORECTAL CA SCREEN DOC REV: CPT | Performed by: SURGERY

## 2021-04-15 RX ORDER — ATORVASTATIN CALCIUM 10 MG/1
10 TABLET, FILM COATED ORAL DAILY
Qty: 90 TABLET | Refills: 1 | Status: SHIPPED | OUTPATIENT
Start: 2021-04-15

## 2021-04-15 NOTE — PROGRESS NOTES
Mr. Shiraz Claros is a 61 y.o. right handed man  who is seen today in Hand Surgical Consultation at the request of Maryellen Pressley DO. He presents today regarding right symptoms which have been present for approximately 3 months. A history of antecedent trauma or injury is Absent. He reports symptoms to include moderate pain and stiffness with frequent popping, catching or locking of the right Index Finger and Middle Finger. Finger symptoms are Frequently worse in the morning or overnight. He reports moderate pain located at the base of the symptomatic finger(s). Symptoms are worsening over time. Previous treatment has included conservative measures. He does not claim relation of his symptoms to his required work activities. He has not undergone any form of testing. I have today reviewed with Shiraz Gross. the clinically relevant, past medical history, medications, allergies,  family history, social history, and Review Of Systems & I have documented any details relevant to today's presenting complaints in my history above. Mr. Donald Snyders self-reported past medical history, medications, allergies,  family history, social history, and Review Of Systems have been scanned into the chart under the \"Media\" tab. Physical Exam:  Mr. Donald Dickerson Jr.'s most recent vitals:  Vitals  BP: (!) 149/90  Pulse: 68  Resp: 14  Height: 5' 8\" (172.7 cm)  Weight: 292 lb (132.5 kg)    He is well nourished, oriented to person, place & time. He demonstrates appropriate mood and affect as well as normal gait and station. Skin: Normal in appearance, Normal Color and Free of Lesions Bilaterally   Digital range of motion is limited by pain and stiffenss on the Right, greater than Left. Inducible triggering is noted upon flexion in the right Index Finger and Middle Finger. There is an associated flexion contracture of the PIP joint.   No other digit demonstrates evidence for stenosing tenosynovitis bilaterally. Wrist range of motion is equal bilaterally . Sensation is present in the Whole Hand bilaterally  Vascular examination reveals normal and good capillary refill bilaterally  Swelling is mild in the symptomatic digit, absent elsewhere bilaterally  There is no evidence of gross joint instability bilaterally. Muscular strength is clinically appropriate bilaterally. Examination for Stenosing Tenosynovitis demonstrates moderate tenderness, thickening & nodularity at the A-1 pulley(s) of the Right Index Finger and Middle Finger. There is a palpable Nota's Node. There is Inducible triggering on active flexion with pain. No other digits demonstrate evidence of Stenosing Tenosynovitis. Impression:  Mr. Sue Judge is showing clinical evidence of Stenosing Tenosynovitis (Trigger Finger) and presents requesting further treatment. Plan:  I have had a thorough discussion with Mr. Sue Judge regarding the treatment options available for his initially presenting Index Finger and Middle Finger stenosing tenosynovitis, which is causing him significant  limitations. I have outlined for Mr. Sue Judge the benefits and consequences of the various treatment modalities, including the fact that surgical treatment is the only modality which is reasonably expected to provide long lasting or permanent resolution of his symptoms. Based upon our current discussion and a reasonable understating of the options available to him, Mr. Sue Judge has selected to proceed with surgical Index Finger and Middle Finger Trigger Finger Release. I have discussed the details of the surgical procedure, the pre, sharon and postoperative concerns and the appropriate expectations after surgery with Mr. Sue Judge today.  He was given the opportunity to ask questions, voiced an understanding of the procedure, and he did wish to proceed with Right Index Finger and Middle Finger Trigger Finger Release (A1 Pulley Release). I had an extensive discussion with Mr. Erma Rendon (and any family members present with him today) regarding the natural history, etiology, and long term consequences of this problem. We have mutually selected a surgical treatment plan  and, in my opinion, surgical intervention is indicated at this time. I have discussed with him the potential complications, limitations, expectations, alternatives, and risks of Trigger Finger Release. He has had full opportunity to ask his questions. I have answered them all to his satisfaction. I feel that Mr. Erma Rendon (and any family members present with him today) do understand our discussion today and he has provided informed consent for Right Index Finger and Middle Finger Trigger Finger Release (A1 Pulley Release). I have also discussed with Mr. Erma Rendon the other treatment options available to him for this condition. We have today selected to proceed with Surgical treatment. He has voiced and  understanding that there are other less aggressive treatment options which are available in this situation, albeit possibly less efficacious or durable, and he is comfortable with the plan that he has chosen. I have explained to Mr. Erma Rendon that despite successful treatment (surgical or otherwise) of his current presenting condition, that due to his coexistent conditions (both diagnosed and undiagnosed), that he is likely to have some permanent residual symptoms related to these conditions that do not improve long term. I have also explained that maximal recovery of function & symptom improvement may take a full year or longer to realize. He voiced a clear understanding of this. Mr. Erma Rendon has been given a full verbal list of instructions and precautions related to his present condition.   I have asked him to followup with me in the office at the prescribed time. He is also specifically requested to call or return to the office sooner if his symptoms change or worsen prior to the next scheduled appointment.

## 2021-04-15 NOTE — PATIENT INSTRUCTIONS
Pre-Operative Instructions    1. The night before your surgery, unless otherwise instructed, do not eat any food, drink any liquids, chew gum or mints after midnight. Abstain from alcohol for 24 hours prior to surgery. 2. You will be contacted by the Hospital the working day prior to your procedure to confirm your arrival time. 3. Patients under 25years of age must have a parent or legal guardian present to sign their consent and discharge paperwork. 4. On the day of surgery,  you will be seen pre-operatively by an anesthesiologist.     5. If you are having hand surgery, it is recommended that nail polish and acrylic nails be removed prior to surgery if possible. 6. Please bring cases for glasses, contact lenses, hearing aids or dentures. They will likely be removed prior to surgery. 7. Wear casual, loose-fitting and comfortable clothing. Consider that you may have a large dressing to fit under your clothing after surgery. 9. Please do not bring valuables such as jewelry or large sums of cash to the hospital. Remove all body piercings before coming to the hospital. Jacy Chand may not  wear any rings on the hand if you are having surgery on that hand, wrist or elbow. 10. Do not smoke or chew tobacco before your surgery. [address] [place] [address] and surgery facilities are smoke-free environments. Smoking is not permitted anywhere on campus. 11. Be sure to follow any additional instructions from your physician. If the above conditions are not met, your surgery may be cancelled and rescheduled for another day. Should you develop any change in your health such as fever, cough, sore throat, cold, flu, or infection, or if you have any questions regarding your Pre-admission or surgery, please contact [facility] - Surgery Scheduling at [phone], Monday through Friday, 9 a.m. to 5 p.m.

## 2021-04-15 NOTE — PROGRESS NOTES
Mahi Go. lost 6.8 lbs over past ~month. Is pt eating at least 4 times everyday? yes - 2 meals & 2 snacks    Is pt eating a lean protein source with all meals and snacks? yes - turkey / beans / protein shakes / beef / chicken / eggs     Has pt decreased their portions using the plate method? yes - using smaller plate    Is pt choosing low fat/sugar free options? yes- states he hasn't had candy or snack cakes since his first visit    Is pt drinking at least 64 oz of clear liquids everyday?  yes - water / decaf coffee / diet green tea     Has pt stopped drinking carbonation, caffeinated, and sugar sweetened beverages? eliminated soda / some carbonated water     Has pt sampled Unjury and/or Nectar protein? discussed, to try    Participating in intentional exercise? limited d/t back (rods/skrews) / walks around a lot, stays busy    Plan/Recommendations:   - Continue plan  - Try protein powder  - Complete Support Group  - Avoid carbonation     Handouts: signed up for April 22 KATHY Hernandez

## 2021-04-15 NOTE — TELEPHONE ENCOUNTER
Future Appointments   Date Time Provider Clair Annie   4/15/2021  1:00 PM Kevin Arnett MD [facility]   4/19/2021  8:10 AM SCHEDULE, MHC[facility] AND FLU CLINIC AND FLU [facility]   4/27/2021  9:00 AM SCHEDULE, CHITRA SLEEP HSAT [facility]EP [facility]   5/6/2021  1:00 PM Frantz Pretty MD AND PULM [facility]   6/15/2021  9:00 AM DO TAMARA Velazco  [address]

## 2021-04-15 NOTE — PROGRESS NOTES
[facility]   [facility] [employer]  Arely Lucas MD, [facility], [address] [place] [address]    [address] [id number] . Phone: [phone]  Fax: [phone]          Chief Complaint   Patient presents with    Obesity     2nd pre-surg         HPI:     Rexann Meckel. is a very pleasant 61 y.o. male with Body mass index is 44.23 kg/m². / Chronic Obesity. Ligia Bowden has been struggling for several years now with obesity. Ligia Bowden feels the weight is an obstacle to achieve and perform things in daily living as well risk on health. Patient  is very determined to lose weight and be healthy, and is working towards  surgical weight loss to achieve this goal. Pre-operative clearance and work up pending. Working hard to keep good dietary habits as well level of activity. Patient denies any nausea, vomiting, fevers, chills, shortness of breath, chest pain, cough, constipation or difficulty urinating.     Pain Assessment   Denies any abdominal pain       Past Medical History:   Diagnosis Date    Bipolar disorder ([address]     CAD (coronary artery disease) 8/25/10    Cerebral artery occlusion with cerebral infarction ([address] 2011    L hand less fine motor movements    Chronic back pain     Controlled type 2 diabetes mellitus with diabetic polyneuropathy, without long-term current use of insulin ([address]     Controlled type 2 diabetes mellitus with diabetic polyneuropathy, without long-term current use of insulin ([address]     Depression 8/25/10    Displacement of cervical intervertebral disc without myelopathy     Gastroesophageal reflux 8/25/10    Hypercholesteremia     Hyperlipidemia 8/25/10    Hypertension     benign essential    Hypogonadism     Hypothyroidism     acquired    Morbid obesity with BMI of 40.0-44.9, adult ([address]     Morbid obesity with BMI of 40.0-44.9, adult ([address]     Osteoarthritis     Osteoarthritis of spine 8/25/10     Past Surgical History:   Procedure Laterality Date    CERVICAL FUSION      CHOLECYSTECTOMY      COLONOSCOPY N/A 2019    COLONOSCOPY POLYPECTOMY SNARE/COLD BIOPSY performed by Araseli Lua MD at [address] Bilateral 2019    BILATERAL LUMBAR THREE TRANSFORAMINAL EPIDURAL STEROID INJECTION SITE CONFIRMED BY FLUOROSCOPY performed by Iris Conrad MD at [address]     LUMBAR FUSION      LUMBAR SPINE SURGERY Bilateral 2019    BILATERAL L3 TRANSFORAMINAL EPIDURAL STEROID INJECTION WITH FLUOROSCOPY performed by Iris Conrad MD at [address] History   Problem Relation Age of Onset    Cancer Mother         Cervical    Heart Disease Father     High Blood Pressure Father     Elevated Lipids Father     High Blood Pressure Paternal Grandmother      Social History     Tobacco Use    Smoking status: Former Smoker     Packs/day: 1.00     Years: 30.00     Pack years: 30.00     Types: Cigarettes     Start date: 3/20/1984     Quit date: 3/20/2020     Years since quittin.0    Smokeless tobacco: Never Used    Tobacco comment: Quit 13 mths ago   Substance Use Topics    Alcohol use: No     I counseled the patient on the importance of not smoking and risks of ETOH. Allergies   Allergen Reactions    Mobic [Meloxicam]      Upset stomach    Morphine Sulfate Itching     Pt is only allergic to Morphine ER, not IVP Morphine. Vitals:    04/15/21 1255   BP: 105/64   Pulse: 65   Resp: 18   SpO2: 98%   Weight: 282 lb 6.4 oz (128.1 kg)   Height: 5' 7\" (1.702 m)       Body mass index is 44.23 kg/m².     Lab Results   Component Value Date    WBC 5.7 2021    RBC 4.49 2021    HGB 13.8 2021    HCT 40.6 2021    MCV 90.6 2021    MCH 30.9 2021    MCHC 34.1 2021    MPV 7.7 2021    NEUTOPHILPCT 55.0 2021    LYMPHOPCT 35.6 2021    MONOPCT 5.6 2021    EOSRELPCT 2.6 2021    BASOPCT 1.2 times daily, Disp: 1 kit, Rfl: 0    lisinopril (PRINIVIL;ZESTRIL) 30 MG tablet, TAKE 1 TABLET BY MOUTH DAILY, Disp: 90 tablet, Rfl: 1    atorvastatin (LIPITOR) 10 MG tablet, TAKE 1 TABLET BY MOUTH DAILY, Disp: 90 tablet, Rfl: 1    sildenafil (VIAGRA) 100 MG tablet, Take 1 tablet by mouth as needed for Erectile Dysfunction, Disp: 30 tablet, Rfl: 3    medical marijuana, Take by mouth as needed. , Disp: , Rfl:     Review of Systems - History obtained from the patient  General ROS: negative  Psychological ROS: negative  Ophthalmic ROS: negative  Neurological ROS: negative  ENT ROS: negative  Allergy and Immunology ROS: negative  Hematological and Lymphatic ROS: negative  Endocrine ROS: negative  Breast ROS: negative  Respiratory ROS: negative  Cardiovascular ROS: negative  Gastrointestinal ROS:negative  Genito-Urinary ROS: negative  Musculoskeletal ROS: negative   Skin ROS: negative    Physical Exam   Vitals Reviewed   Constitutional: Patient is oriented to person, place, and time. Patient appears well-developed and well-nourished. Patient is active and cooperative. Non-toxic appearance. No distress. HENT:   Head: Normocephalic and atraumatic. Head is without abrasion and without laceration. Hair is normal.   Right Ear: External ear normal. No lacerations. No drainage, swelling . Left Ear: External ear normal. No lacerations. No drainage, swelling. Nose/Mouth: face mask in place  Eyes: Conjunctivae, EOM and lids are normal. Right eye exhibits no discharge. No foreign body present in the right eye. Left eye exhibits no discharge. No foreign body present in the left eye. No scleral icterus. Neck: Trachea normal and normal range of motion. No JVD present. Pulmonary/Chest: Effort normal. No accessory muscle usage or stridor. No apnea. No respiratory distress. Cardiovascular: Normal rate and no JVD. Abdominal: Normal appearance. Patient exhibits no distension. Abdomen is soft, obese, non tender. Musculoskeletal: Normal range of motion. Patient exhibits no edema. Neurological: Patient is alert and oriented to person, place, and time. Patient has normal strength. GCS eye subscore is 4. GCS verbal subscore is 5. GCS motor subscore is 6. Skin: Skin is warm and dry. No abrasion and no rash noted. Patient is not diaphoretic. No cyanosis or erythema. Psychiatric: Patient has a normal mood and affect. Speech is normal and behavior is normal. Cognition and memory are normal.       A/P    Sue Queen. is 61 y.o. male, Body mass index is 44.23 kg/m². pre surgery, has lost 7 lbs since last visit. The patient underwent dietary counseling with registered dietician. I have reviewed, discussed and agree with the dietary plan. Patient is trying hard to keep good dietary and behavior modifications. Patient is monitoring portion sizes, food choices and liquid calories. Patient is trying to exercise regularly as much as possible. Obesity as a disease is considered a high risk to patients overall health and should therefore be considered a high risk disease state. Now with Covid-19 pandemic, CDC and health authorities does classify obese patients as vulnerable and high risk as well. Which makes weight loss a priority for improvement of their wellbeing and overall health.      We discussed how his weight affects his overall health including:  Patient Active Problem List   Diagnosis    Diabetes mellitus type 2 in obese ([address]    Mixed hyperlipidemia    Hypothyroid    Hypogonadism    Polyp of rectum    Tobacco abuse    Spondylosis    Personal history of other diseases of the circulatory system    Pain in thoracic spine    Fracture with nonunion    Diet-controlled diabetes mellitus ([address]    Chronic pain disorder    Cerebrovascular accident [facility])    Essential hypertension    Backache    Morbid obesity with BMI of 45.0-49.9, adult ([address]    Bipolar disorder ([address]    Preop cardiovascular exam   

## 2021-04-15 NOTE — PATIENT INSTRUCTIONS
Patient received dietary handouts and education.     Plan/Recommendations:   - Continue plan  - Try protein powder  - Complete Support Group  - Avoid carbonation

## 2021-04-19 ENCOUNTER — OFFICE VISIT (OUTPATIENT)
Dept: PRIMARY CARE CLINIC | Age: 56
End: 2021-04-19
Payer: MEDICARE

## 2021-04-19 DIAGNOSIS — Z01.818 PREOP TESTING: Primary | ICD-10-CM

## 2021-04-19 LAB — SARS-COV-2: NOT DETECTED

## 2021-04-19 PROCEDURE — G8417 CALC BMI ABV UP PARAM F/U: HCPCS | Performed by: NURSE PRACTITIONER

## 2021-04-19 PROCEDURE — 99211 OFF/OP EST MAY X REQ PHY/QHP: CPT | Performed by: NURSE PRACTITIONER

## 2021-04-19 PROCEDURE — G8428 CUR MEDS NOT DOCUMENT: HCPCS | Performed by: NURSE PRACTITIONER

## 2021-04-19 NOTE — PROGRESS NOTES
Patient reached __X__ yes  _____ no   VM instructions left ____ yes   phone number ________                                ____ no-office notified          Date __4/23/21_______  Time _0945______  Arrival __0745  hosp-endo____    Nothing to eat or drink after midnight-follow your doctors prep instructions-this may include taking a second dose of your prep after midnight  Responsible adult 25 or older to stay on site while you are here-drive you home-stay with you after  Follow any instructions your doctors office has given you  Bring a complete list of all your medications and supplements including name,dose,how often taken the day of your procedure  If you normally take the following medications in the morning please do so the AM of your procedure with a small sip of water       Heart,blood pressure,seizure,thyroid or breathing medications-use your inhalers       DO NOT take blood pressure medications ending in \"raimundo\" or \"pril\" the AM of procedure or evening prior-DO NOT TAKE LISINOPRIL  Take half or your normal dose of any long acting insulins the night before your procedure-do not take any diabetic medications the AM of procedure  Follow your doctors instructions regarding stopping or taking  any blood thinners-if you do not have instructions-call them  Any questions call your doctor  Other __take levothyroxine am of procedure____________________________________________________________      COVID TEST     __ done- where ____  _x_ scheduled _4/19/21____ where [facility]__  __ other __________        [facility] Crape POLICY(subject to change)         There is a one visitor policy at [facility] for all surgeries and endoscopies. Whether the visitor can stay or will be asked to wait in the car will depend on the current policy and if social distancing can be maintained. The policy is subject to change at any time. Please make sure the visitor has a cell phone that is on,charged and able to accept calls, as this may be the way that the staff communicates with them. Pain management is NO VISITOR policyThe patients ride is expected to remain in the car with a cell phone for communication. If the ride is leaving the hospital grounds please make sure they are back in time for pickup. Have the patient inform the staff on arrival what their rides plans are while the patient is in the facility. At the MAIN there is one visitor allowed. Please note that the visitor policy is subject to change.

## 2021-04-19 NOTE — PATIENT INSTRUCTIONS
Advance Care Planning  People with COVID-19 may have no symptoms, mild symptoms, such as fever, cough, and shortness of breath or they may have more severe illness, developing severe and fatal pneumonia. As a result, Advance Care Planning with attention to naming a health care decision maker (someone you trust to make healthcare decisions for you if you could not speak for yourself) and sharing other health care preferences is important BEFORE a possible health crisis. Please contact your Primary Care Provider to discuss Advance Care Planning. Preventing the Spread of Coronavirus Disease 2019 in Homes and Residential Communities  For the most recent information go to [email or web address]    Prevention steps for People with confirmed or suspected COVID-19 (including persons under investigation) who do not need to be hospitalized  and   People with confirmed COVID-19 who were hospitalized and determined to be medically stable to go home    Your healthcare provider and public health staff will evaluate whether you can be cared for at home. If it is determined that you do not need to be hospitalized and can be isolated at home, you will be monitored by staff from your local or state health department. You should follow the prevention steps below until a healthcare provider or local or state health department says you can return to your normal activities. Stay home except to get medical care  People who are mildly ill with COVID-19 are able to isolate at home during their illness. You should restrict activities outside your home, except for getting medical care. Do not go to work, school, or public areas. Avoid using public transportation, ride-sharing, or taxis. Separate yourself from other people and animals in your home  People: As much as possible, you should stay in a specific room and away from other people in your home.  Also, you should use a separate before eating or preparing food. If soap and water are not readily available, use an alcohol-based hand  with at least 60% alcohol, covering all surfaces of your hands and rubbing them together until they feel dry. Soap and water are the best option if hands are visibly dirty. Avoid touching your eyes, nose, and mouth with unwashed hands. Avoid sharing personal household items  You should not share dishes, drinking glasses, cups, eating utensils, towels, or bedding with other people or pets in your home. After using these items, they should be washed thoroughly with soap and water. Clean all high-touch surfaces everyday  High touch surfaces include counters, tabletops, doorknobs, bathroom fixtures, toilets, phones, keyboards, tablets, and bedside tables. Also, clean any surfaces that may have blood, stool, or body fluids on them. Use a household cleaning spray or wipe, according to the label instructions. Labels contain instructions for safe and effective use of the cleaning product including precautions you should take when applying the product, such as wearing gloves and making sure you have good ventilation during use of the product. Monitor your symptoms  Seek prompt medical attention if your illness is worsening (e.g., difficulty breathing). Before seeking care, call your healthcare provider and tell them that you have, or are being evaluated for, COVID-19. Put on a facemask before you enter the facility. These steps will help the healthcare providers office to keep other people in the office or waiting room from getting infected or exposed. Ask your healthcare provider to call the local or state health department. Persons who are placed under active monitoring or facilitated self-monitoring should follow instructions provided by their local health department or occupational health professionals, as appropriate. When working with your local health department check their available hours.   If you have a medical emergency and need to call 911, notify the dispatch personnel that you have, or are being evaluated for COVID-19. If possible, put on a facemask before emergency medical services arrive. Discontinuing home isolation  Patients with confirmed COVID-19 should remain under home isolation precautions until the risk of secondary transmission to others is thought to be low. The decision to discontinue home isolation precautions should be made on a case-by-case basis, in consultation with healthcare providers and state and local health departments.

## 2021-04-19 NOTE — PROGRESS NOTES
Tala Hubbard received a viral test for COVID-19. They were educated on isolation and quarantine as appropriate. For any symptoms, they were directed to seek care from their PCP, given contact information to establish with a doctor, directed to an urgent care or the emergency room.

## 2021-04-23 ENCOUNTER — ANESTHESIA (OUTPATIENT)
Dept: ENDOSCOPY | Age: 56
End: 2021-04-23
Payer: MEDICARE

## 2021-04-23 ENCOUNTER — ANESTHESIA EVENT (OUTPATIENT)
Dept: ENDOSCOPY | Age: 56
End: 2021-04-23
Payer: MEDICARE

## 2021-04-23 ENCOUNTER — HOSPITAL ENCOUNTER (OUTPATIENT)
Age: 56
Setting detail: OUTPATIENT SURGERY
Discharge: HOME | End: 2021-04-23
Attending: SURGERY | Admitting: SURGERY
Payer: MEDICARE

## 2021-04-23 VITALS — SYSTOLIC BLOOD PRESSURE: 143 MMHG | OXYGEN SATURATION: 99 % | DIASTOLIC BLOOD PRESSURE: 85 MMHG

## 2021-04-23 VITALS
WEIGHT: 280 LBS | RESPIRATION RATE: 14 BRPM | OXYGEN SATURATION: 97 % | DIASTOLIC BLOOD PRESSURE: 77 MMHG | HEART RATE: 49 BPM | TEMPERATURE: 97.2 F | SYSTOLIC BLOOD PRESSURE: 131 MMHG | BODY MASS INDEX: 42.44 KG/M2 | HEIGHT: 68 IN

## 2021-04-23 PROBLEM — K44.9 HIATAL HERNIA: Status: ACTIVE | Noted: 2021-04-23

## 2021-04-23 PROBLEM — Z01.810 PREOP CARDIOVASCULAR EXAM: Status: RESOLVED | Noted: 2021-03-18 | Resolved: 2021-04-23

## 2021-04-23 PROBLEM — K21.9 CHRONIC GERD: Status: ACTIVE | Noted: 2021-04-23

## 2021-04-23 LAB
GLUCOSE BLD-MCNC: 107 MG/DL (ref 70–99)
PERFORMED ON: ABNORMAL

## 2021-04-23 PROCEDURE — 7100000000 HC PACU RECOVERY - FIRST 15 MIN: Performed by: SURGERY

## 2021-04-23 PROCEDURE — 88342 IMHCHEM/IMCYTCHM 1ST ANTB: CPT

## 2021-04-23 PROCEDURE — 2709999900 HC NON-CHARGEABLE SUPPLY: Performed by: SURGERY

## 2021-04-23 PROCEDURE — 7100000011 HC PHASE II RECOVERY - ADDTL 15 MIN: Performed by: SURGERY

## 2021-04-23 PROCEDURE — 43239 EGD BIOPSY SINGLE/MULTIPLE: CPT | Performed by: SURGERY

## 2021-04-23 PROCEDURE — 3700000000 HC ANESTHESIA ATTENDED CARE: Performed by: SURGERY

## 2021-04-23 PROCEDURE — 2580000003 HC RX 258: Performed by: ANESTHESIOLOGY

## 2021-04-23 PROCEDURE — 2500000003 HC RX 250 WO HCPCS: Performed by: NURSE ANESTHETIST, CERTIFIED REGISTERED

## 2021-04-23 PROCEDURE — 3609012400 HC EGD TRANSORAL BIOPSY SINGLE/MULTIPLE: Performed by: SURGERY

## 2021-04-23 PROCEDURE — 6360000002 HC RX W HCPCS: Performed by: NURSE ANESTHETIST, CERTIFIED REGISTERED

## 2021-04-23 PROCEDURE — 7100000010 HC PHASE II RECOVERY - FIRST 15 MIN: Performed by: SURGERY

## 2021-04-23 PROCEDURE — 7100000001 HC PACU RECOVERY - ADDTL 15 MIN: Performed by: SURGERY

## 2021-04-23 PROCEDURE — 88305 TISSUE EXAM BY PATHOLOGIST: CPT

## 2021-04-23 RX ORDER — LIDOCAINE HYDROCHLORIDE 20 MG/ML
INJECTION, SOLUTION INFILTRATION; PERINEURAL PRN
Status: DISCONTINUED | OUTPATIENT
Start: 2021-04-23 | End: 2021-04-23 | Stop reason: SDUPTHER

## 2021-04-23 RX ORDER — OMEPRAZOLE 20 MG/1
20 CAPSULE, DELAYED RELEASE ORAL DAILY
Qty: 30 CAPSULE | Refills: 3 | Status: SHIPPED | OUTPATIENT
Start: 2021-04-23

## 2021-04-23 RX ORDER — PROPOFOL 10 MG/ML
INJECTION, EMULSION INTRAVENOUS PRN
Status: DISCONTINUED | OUTPATIENT
Start: 2021-04-23 | End: 2021-04-23 | Stop reason: SDUPTHER

## 2021-04-23 RX ORDER — SODIUM CHLORIDE 9 MG/ML
INJECTION, SOLUTION INTRAVENOUS CONTINUOUS
Status: DISCONTINUED | OUTPATIENT
Start: 2021-04-23 | End: 2021-04-23 | Stop reason: HOSPADM

## 2021-04-23 RX ORDER — GLYCOPYRROLATE 1 MG/5 ML
SYRINGE (ML) INTRAVENOUS PRN
Status: DISCONTINUED | OUTPATIENT
Start: 2021-04-23 | End: 2021-04-23 | Stop reason: SDUPTHER

## 2021-04-23 RX ADMIN — LIDOCAINE HYDROCHLORIDE 80 MG: 20 INJECTION, SOLUTION INFILTRATION; PERINEURAL at 09:31

## 2021-04-23 RX ADMIN — SODIUM CHLORIDE: 9 INJECTION, SOLUTION INTRAVENOUS at 08:07

## 2021-04-23 RX ADMIN — PROPOFOL 50 MG: 10 INJECTION, EMULSION INTRAVENOUS at 09:36

## 2021-04-23 RX ADMIN — Medication 0.2 MG: at 09:31

## 2021-04-23 RX ADMIN — PROPOFOL 200 MG: 10 INJECTION, EMULSION INTRAVENOUS at 09:31

## 2021-04-23 RX ADMIN — PROPOFOL 50 MG: 10 INJECTION, EMULSION INTRAVENOUS at 09:34

## 2021-04-23 ASSESSMENT — PULMONARY FUNCTION TESTS
PIF_VALUE: 1

## 2021-04-23 ASSESSMENT — ENCOUNTER SYMPTOMS: SHORTNESS OF BREATH: 0

## 2021-04-23 NOTE — ANESTHESIA POSTPROCEDURE EVALUATION
Department of Anesthesiology  Postprocedure Note    Patient: Vito Hernandez MRN: [id number]  YOB: 1965  Date of evaluation: 4/23/2021  Time:  10:29 AM     Procedure Summary     Date: 04/23/21 Room / Location: [address] [place] [address]    Anesthesia Start: 6675 Anesthesia Stop: 0116    Procedure: EGD BIOPSY (N/A Abdomen) Diagnosis: (GERD K21.9)    Surgeons: Asif Mackey MD Responsible Provider: Gill Alarcon MD    Anesthesia Type: MAC ASA Status: 3          Anesthesia Type: MAC    Kyler Phase I: Kyler Score: 10    Kyler Phase II: Kyler Score: 10    Last vitals: Reviewed and per EMR flowsheets.        Anesthesia Post Evaluation    Level of consciousness: awake  Complications: no

## 2021-04-23 NOTE — ANESTHESIA PRE PROCEDURE
Department of Anesthesiology  Preprocedure Note       Name:  Juan Pablo Muniz. Age:  61 y.o.  :  1965                                          MRN:  [id number]         Date:  2021      Surgeon: Alfonso Alas): Linette Gonzalez MD    Procedure: Procedure(s):  EGD ESOPHAGOGASTRODUODENOSCOPY    Medications prior to admission:   Prior to Admission medications    Medication Sig Start Date End Date Taking? Authorizing Provider   atorvastatin (LIPITOR) 10 MG tablet TAKE 1 TABLET BY MOUTH DAILY 4/15/21  Yes Dorotyh Edgar DO   vitamin D (CHOLECALCIFEROL) 53096 UNIT CAPS Take 1 capsule by mouth once a week 4/15/21 7/14/21 Yes Linette Gonzalez MD   pregabalin (LYRICA) 150 MG capsule TAKE 1 CAPSULE BY MOUTH TWICE DAILY 3/24/21 6/18/21 Yes Dorothy Edgar DO   levothyroxine (SYNTHROID) 100 MCG tablet Take 1 tablet by mouth daily 3/9/21  Yes Dorothy Edgar DO   lisinopril (PRINIVIL;ZESTRIL) 30 MG tablet TAKE 1 TABLET BY MOUTH DAILY 21  Yes Dorothy Edgar DO   medical marijuana Take by mouth as needed. Yes Historical Provider, MD   Cholecalciferol 50 MCG (2000 UT) TABS Take 1 tablet by mouth daily Take 1 tablet by mouth daily. Start this medication after you finish the weekly regimen 4/15/21   Linette Gonzalez MD   Blood Pressure KIT 1 kit by Does not apply route 2 times daily 21   Dorothy Edgar DO   sildenafil (VIAGRA) 100 MG tablet Take 1 tablet by mouth as needed for Erectile Dysfunction 19   Dorothy Edgar DO       Current medications:    No current facility-administered medications for this encounter. Allergies: Allergies   Allergen Reactions    Mobic [Meloxicam]      Upset stomach    Morphine Sulfate Itching     Pt is only allergic to Morphine ER, not IVP Morphine.        Problem List:    Patient Active Problem List   Diagnosis Code    Diabetes mellitus type 2 in obese (HCC) E11.69, E66.9    Mixed hyperlipidemia E78.2    Hypothyroid E03.9    Hypogonadism OR    HERNIA REPAIR      KNEE SURGERY Right     LUMBAR FUSION      LUMBAR SPINE SURGERY Bilateral 2019    BILATERAL L3 TRANSFORAMINAL EPIDURAL STEROID INJECTION WITH FLUOROSCOPY performed by Danielito Robb MD at [place] [address] History:    Social History     Tobacco Use    Smoking status: Former Smoker     Packs/day: 1.00     Years: 30.00     Pack years: 30.00     Types: Cigarettes     Start date: 3/20/1984     Quit date: 3/20/2020     Years since quittin.0    Smokeless tobacco: Never Used    Tobacco comment: Quit 13 mths ago   Substance Use Topics    Alcohol use: No                                Counseling given: Not Answered  Comment: Quit 13 mths ago      Vital Signs (Current):   Vitals:    21 0901 21 0741   BP:  (!) 142/81   Pulse:  61   Resp:  16   Temp:  96.2 °F (35.7 °C)   TempSrc:  Temporal   SpO2:  100%   Weight: 285 lb (129.3 kg) 280 lb (127 kg)   Height: 5' 8\" (1.727 m) 5' 8\" (1.727 m)                                              BP Readings from Last 3 Encounters:   21 (!) 142/81   04/15/21 105/64   21 (!) 149/90       NPO Status: Time of last liquid consumption: 0000                        Time of last solid consumption: 0000                        Date of last liquid consumption: 21                        Date of last solid food consumption: 21    BMI:   Wt Readings from Last 3 Encounters:   21 280 lb (127 kg)   04/15/21 282 lb 6.4 oz (128.1 kg)   21 292 lb (132.5 kg)     Body mass index is 42.57 kg/m².     CBC:   Lab Results   Component Value Date    WBC 5.7 2021    RBC 4.49 2021    HGB 13.8 2021    HCT 40.6 2021    MCV 90.6 2021    RDW 12.9 2021     2021       CMP:   Lab Results   Component Value Date     2021    K 4.3 2021     2021    CO2 25 2021    BUN 12 2021    CREATININE 0.9 2021    GFRAA >60 2021    AGRATIO 0.9 2021 LABGLOM >60 03/23/2021    LABGLOM 80 05/19/2011    GLUCOSE 132 03/23/2021    GLUCOSE 89 05/19/2011    PROT 8.2 03/23/2021    PROT 8.0 05/19/2011    CALCIUM 8.9 03/23/2021    BILITOT 0.4 03/23/2021    ALKPHOS 47 03/23/2021    AST 18 03/23/2021    ALT 21 03/23/2021       POC Tests: No results for input(s): POCGLU, POCNA, POCK, POCCL, POCBUN, POCHEMO, POCHCT in the last 72 hours. Coags:   Lab Results   Component Value Date    PROTIME 12.5 03/23/2021    INR 1.08 03/23/2021       HCG (If Applicable): No results found for: PREGTESTUR, PREGSERUM, HCG, HCGQUANT     ABGs: No results found for: PHART, PO2ART, LOK6IPM, FSX9SXR, BEART, B1AWNLKD     Type & Screen (If Applicable):  No results found for: LABABO, LABRH    Drug/Infectious Status (If Applicable):  No results found for: HIV, HEPCAB    COVID-19 Screening (If Applicable):   Lab Results   Component Value Date    COVID19 Not Detected 04/19/2021    COVID19 NOT DETECTED 01/26/2021           Anesthesia Evaluation  Patient summary reviewed and Nursing notes reviewed no history of anesthetic complications:   Airway: Mallampati: II  TM distance: >3 FB   Neck ROM: full  Mouth opening: > = 3 FB Dental:    (+) edentulous      Pulmonary:       (-) asthma and shortness of breath                           Cardiovascular:    (+) hypertension:, CAD:,     (-)  angina                Neuro/Psych:   (+) CVA:, psychiatric history: stable with treatment            GI/Hepatic/Renal:   (+) GERD:, morbid obesity     (-) liver disease       Endo/Other:    (+) DiabetesType II DM, , hypothyroidism: arthritis: OA., .                 Abdominal:           Vascular:     - PVD. Anesthesia Plan      MAC     ASA 3       Induction: intravenous. Anesthetic plan and risks discussed with patient. Plan discussed with CRNA.                   Mandeep Martinez MD   4/23/2021

## 2021-04-23 NOTE — H&P
Department of [address] [place] [address] Physicians   [employer] [facility] [employer]  Attending Pre-operative History and Physical      DIAGNOSIS:  Obesity    INDICATION:  Pre-op    PROCEDURE:  EGD    CHIEF COMPLAINT:  Obesity    History Obtained From:  patient    HISTORY OF PRESENT ILLNESS:    The patient is a 61 y.o. male with significant past medical history of   Patient Active Problem List   Diagnosis    Diabetes mellitus type 2 in obese ([address]    Mixed hyperlipidemia    Hypothyroid    Hypogonadism    Polyp of rectum    Tobacco abuse    Spondylosis    Personal history of other diseases of the circulatory system    Pain in thoracic spine    Fracture with nonunion    Diet-controlled diabetes mellitus ([address]    Chronic pain disorder    Cerebrovascular accident ([address]    Essential hypertension    Backache    Morbid obesity with BMI of 45.0-49.9, adult ([address]    Bipolar disorder ([address]    Preop cardiovascular exam    Morbid obesity with BMI of 40.0-44.9, adult ([address]    Chronic GERD      who presents for pre-op EGD    Past Medical History:        Diagnosis Date    Bipolar disorder ([address]     CAD (coronary artery disease) 8/25/10    Cerebral artery occlusion with cerebral infarction ([address] 2011    L hand less fine motor movements    Chronic back pain     Controlled type 2 diabetes mellitus with diabetic polyneuropathy, without long-term current use of insulin ([address]     Controlled type 2 diabetes mellitus with diabetic polyneuropathy, without long-term current use of insulin ([address]     Depression 8/25/10    Displacement of cervical intervertebral disc without myelopathy     Gastroesophageal reflux 8/25/10    Hypercholesteremia     Hyperlipidemia 8/25/10    Hypertension     benign essential    Hypogonadism     Hypothyroidism     acquired    Morbid obesity with BMI of 40.0-44.9, adult ([address]     Morbid obesity with BMI of 40.0-44.9, adult ([address]     Osteoarthritis     Osteoarthritis of spine 8/25/10     Past Surgical History:        Procedure Laterality Date    CERVICAL FUSION      CHOLECYSTECTOMY      COLONOSCOPY N/A 06/12/2019    COLONOSCOPY POLYPECTOMY SNARE/COLD BIOPSY performed by Mark Phillips MD at [address] Bilateral 08/13/2019    BILATERAL LUMBAR THREE TRANSFORAMINAL EPIDURAL STEROID INJECTION SITE CONFIRMED BY FLUOROSCOPY performed by Pham De Luna MD at [address]     LUMBAR FUSION      LUMBAR SPINE SURGERY Bilateral 09/20/2019    BILATERAL L3 TRANSFORAMINAL EPIDURAL STEROID INJECTION WITH FLUOROSCOPY performed by Pham eD Luna MD at [facility]     Medications Prior to Admission:   Medications Prior to Admission: atorvastatin (LIPITOR) 10 MG tablet, TAKE 1 TABLET BY MOUTH DAILY  vitamin D (CHOLECALCIFEROL) 57926 UNIT CAPS, Take 1 capsule by mouth once a week  pregabalin (LYRICA) 150 MG capsule, TAKE 1 CAPSULE BY MOUTH TWICE DAILY  levothyroxine (SYNTHROID) 100 MCG tablet, Take 1 tablet by mouth daily  lisinopril (PRINIVIL;ZESTRIL) 30 MG tablet, TAKE 1 TABLET BY MOUTH DAILY  medical marijuana, Take by mouth as needed. Cholecalciferol 50 MCG (2000 UT) TABS, Take 1 tablet by mouth daily Take 1 tablet by mouth daily. Start this medication after you finish the weekly regimen  Blood Pressure KIT, 1 kit by Does not apply route 2 times daily  sildenafil (VIAGRA) 100 MG tablet, Take 1 tablet by mouth as needed for Erectile Dysfunction    Allergies:  Mobic [meloxicam] and Morphine sulfate    Social History:   TOBACCO:   reports that he quit smoking about 13 months ago. His smoking use included cigarettes. He started smoking about 37 years ago. He has a 30.00 pack-year smoking history. He has never used smokeless tobacco.  ETOH:   reports no history of alcohol use.   Family History:       Problem Relation Age of Onset    Cancer Mother         Cervical    Heart Disease Father     High Blood Pressure Father     Elevated Lipids Father     High Blood Pressure Paternal Grandmother          REVIEW OF SYSTEMS:    Review of Systems - History obtained from the patient  General ROS: negative  Psychological ROS: negative  Ophthalmic ROS: negative  Neurological ROS: negative  ENT ROS: negative  Allergy and Immunology ROS: negative  Hematological and Lymphatic ROS: negative  Endocrine ROS: negative  Breast ROS: negative  Respiratory ROS: negative  Cardiovascular ROS: negative  Gastrointestinal ROS:negative  Genito-Urinary ROS: negative  Musculoskeletal ROS: negative   Skin ROS: negative      PHYSICAL EXAM:      BP (!) 142/81   Pulse 61   Temp 96.2 °F (35.7 °C) (Temporal)   Resp 16   Ht 5' 8\" (1.727 m)   Wt 280 lb (127 kg)   SpO2 100%   BMI 42.57 kg/m²  I      Physical Exam   Vitals Reviewed   Constitutional: Patient is oriented to person, place, and time. Patient appears well-developed and well-nourished. Patient is active and cooperative. Non-toxic appearance. No distress. HENT:   Head: Normocephalic and atraumatic. Head is without abrasion and without laceration. Hair is normal.   Right Ear: External ear normal. No lacerations. No drainage, swelling . Left Ear: External ear normal. No lacerations. No drainage, swelling. Nose: Nose normal. No nose lacerations or nasal deformity. Eyes: Conjunctivae, EOM and lids are normal. Right eye exhibits no discharge. No foreign body present in the right eye. Left eye exhibits no discharge. No foreign body present in the left eye. No scleral icterus. Neck: Trachea normal and normal range of motion. No JVD present. Pulmonary/Chest: Effort normal. No accessory muscle usage or stridor. No apnea. No respiratory distress. Cardiovascular: Normal rate and no JVD. Abdominal: Normal appearance. Patient exhibits no distension. Musculoskeletal: Normal range of motion. Patient exhibits no edema.    Neurological: Patient is alert and

## 2021-04-23 NOTE — PROGRESS NOTES
Discharge instructions reviewed with patient, patient alert and oriented. All home medications have been reviewed, questions answered and patient verbalized understanding. Discharge instructions signed. Pt dc'd per wheelchair. Patient discharged home with belongings. Friend taking stable pt home.

## 2021-04-23 NOTE — PROGRESS NOTES
Pt arrived from endo to PACU bay 4. Report received from endo staff. Pt arouses to voice. Pt on RA, NSR, VSS. Will continue to monitor.

## 2021-04-27 ENCOUNTER — HOSPITAL ENCOUNTER (OUTPATIENT)
Dept: SLEEP CENTER | Age: 56
Discharge: HOME | End: 2021-04-29
Payer: MEDICARE

## 2021-04-27 DIAGNOSIS — E66.01 MORBID OBESITY (HCC): ICD-10-CM

## 2021-04-27 DIAGNOSIS — G47.33 OSA (OBSTRUCTIVE SLEEP APNEA): ICD-10-CM

## 2021-04-27 DIAGNOSIS — G25.81 RLS (RESTLESS LEGS SYNDROME): ICD-10-CM

## 2021-04-27 PROCEDURE — 95806 SLEEP STUDY UNATT&RESP EFFT: CPT | Performed by: INTERNAL MEDICINE

## 2021-04-27 PROCEDURE — 95806 SLEEP STUDY UNATT&RESP EFFT: CPT

## 2021-05-06 ENCOUNTER — VIRTUAL VISIT (OUTPATIENT)
Dept: PULMONOLOGY | Age: 56
End: 2021-05-06
Payer: MEDICARE

## 2021-05-06 DIAGNOSIS — G47.33 OSA (OBSTRUCTIVE SLEEP APNEA): Primary | ICD-10-CM

## 2021-05-06 DIAGNOSIS — J30.2 SEASONAL ALLERGIC RHINITIS, UNSPECIFIED TRIGGER: ICD-10-CM

## 2021-05-06 DIAGNOSIS — E66.01 MORBID OBESITY (HCC): ICD-10-CM

## 2021-05-06 DIAGNOSIS — G25.81 RLS (RESTLESS LEGS SYNDROME): ICD-10-CM

## 2021-05-06 PROCEDURE — G8427 DOCREV CUR MEDS BY ELIG CLIN: HCPCS | Performed by: INTERNAL MEDICINE

## 2021-05-06 PROCEDURE — G8417 CALC BMI ABV UP PARAM F/U: HCPCS | Performed by: INTERNAL MEDICINE

## 2021-05-06 PROCEDURE — 3017F COLORECTAL CA SCREEN DOC REV: CPT | Performed by: INTERNAL MEDICINE

## 2021-05-06 PROCEDURE — 99212 OFFICE O/P EST SF 10 MIN: CPT | Performed by: INTERNAL MEDICINE

## 2021-05-06 PROCEDURE — 1036F TOBACCO NON-USER: CPT | Performed by: INTERNAL MEDICINE

## 2021-05-06 RX ORDER — FLUTICASONE PROPIONATE 50 MCG
1 SPRAY, SUSPENSION (ML) NASAL DAILY
Qty: 2 BOTTLE | Refills: 1 | Status: SHIPPED | OUTPATIENT
Start: 2021-05-06

## 2021-05-06 NOTE — PROGRESS NOTES
REASON FOR FOLLOW UP: SHANNON, clearance for bariatric surgery      PCP: Maryam Rose DO    HISTORY OF PRESENT ILLNESS: Juan Jose Mckeon. is a 61y.o. year old male with a history of morbid obesity, multiple medical problems who had presented for clearance for bariatric surgery. He was referred by Dr. Lan Later. Yoel Zhang was seen by virtual visit on 4/7/2021, presents for virtual visit once again through [employer] me after completing a home sleep study. Yoel Zhang has had no major change in his medical symptoms or medical history except for being detected with vitamin D deficiency. He is on weekly vitamin D at present. He does have mild seasonal allergic rhinitis in the fall and spring, symptoms fluctuate from 1 year to another. He has occasional congestion of one nostril, has to turn over in bed. He has occasional diarrhea/bowel symptoms related to prior cholecystectomy. His weight has been unchanged, appetite is about the same. The rest of his ROS was negative. His medications and allergies were reviewed. HST 4/27/2021  Mild SHANNON with AHI 13.9/h: 61 apneas, 39 hypopneas. Low oxygen saturation was 82%, time spent with SaO2 <89% was 11 minutes    Previous notes reviewed and edited as necessary. Yoel [facility] He is a 69-year-old male with multiple medical problems including hypertension, hyperlipidemia, diabetes mellitus, hypothyroidism, bipolar disorder, CAD, stroke. The patient is  for the last 3 years, lives alone in [facility]. He lived 5 years in [place], 1 year in [place], but mostly in [place]. He was a smoker, quit in March or 2020. He is medically disabled, had a stroke related to neck surgery. He worked as a , *, but mostly sold cars    The patient said he did have a sleep study about 7 to 8 years ago, was told he had no SHANNON, only had RLS. The sleep study was at [address] [place] [address], he does not remember details.   His weight has been about the same, approximately 250 pounds, and it fluctuates up and down. When he was , his wife mentioned that he had snoring which appeared to fluctuate. He rarely would wake himself up with snoring, would take a deep breath and go back to sleep again. He goes to bed between 9 and 10 PM, awakens early in the morning to urinate. Some mornings he feels sluggish due to back pain. The patient had been on medical marijuana, is getting off it. The patient was drinking 2/3 2 L bottles of pop, which she has stopped. The patient drinks a cup of coffee in the morning and another in the evening. He says he has not had RLS in a long time, Lyrica appears to help. The patient is a mouth dryness. He is sleepy only with very long distance driving. He did have COVID-19 infection in November. The patient says that with diet changes, he has lost almost 60 pounds over the last year. He was on insulin, no longer on it. There was no other change in his medical or surgical history, his the rest of his ROS was negative. His medications and allergies were reviewed. I have personally reviewed the patient's past medical, surgical, family and personal history. Changes were documented as needed. I have personally reviewed available radiology images. \"x\" indicates this is positive or the patient agrees.    [x] Loud Snoring [] Nighmares   [] Frequent awakenings at night [] Teeth grinding during sleep   [x] Choking for breath at night: rare [] Morning headaches   [x] Gasping during sleep: rare [] Morning dry mouth   [] I've been told that I stop breathing when asleep [] Sleep walking   [x] Restless sleep [] Sleep terrors   [x] Awaken un-refreshed: some days [] Tongue biting during sleep   [] Waking up to urinate [] Bed wetting   [] Crawling feelings in legs when trying to sleep [] Acting out dreams   [] Leg kicking during sleep [] Feeling paralyzed when falling asleep or waking up    [] Leg cramps during reflux 8/25/10    Hypercholesteremia     Hyperlipidemia 8/25/10    Hypertension     benign essential    Hypogonadism     Hypothyroidism     acquired    Morbid obesity with BMI of 40.0-44.9, adult (HCC)     Morbid obesity with BMI of 40.0-44.9, adult (HCC)     SHANNON (obstructive sleep apnea)     Osteoarthritis     Osteoarthritis of spine 8/25/10       PAST SURGICAL HISTORY:  Past Surgical History:   Procedure Laterality Date    CERVICAL FUSION      CHOLECYSTECTOMY      COLONOSCOPY N/A 06/12/2019    COLONOSCOPY POLYPECTOMY SNARE/COLD BIOPSY performed by Hubert Mckeon MD at [address] [place] [address] Bilateral 08/13/2019    BILATERAL LUMBAR THREE TRANSFORAMINAL EPIDURAL STEROID INJECTION SITE CONFIRMED BY FLUOROSCOPY performed by Irene Tyler MD at [facility] Right     LUMBAR FUSION      LUMBAR SPINE SURGERY Bilateral 09/20/2019    BILATERAL L3 TRANSFORAMINAL EPIDURAL STEROID INJECTION WITH FLUOROSCOPY performed by Irene Tyler MD at [address] [place] [address] N/A 4/23/2021    EGD BIOPSY performed by Rahul Soria MD at [facility] 10:  family history includes Cancer in his mother; Elevated Lipids in his father; Heart Disease in his father; High Blood Pressure in his father and paternal grandmother. SOCIAL HISTORY:   reports that he quit smoking about 13 months ago. His smoking use included cigarettes. He started smoking about 37 years ago. He has a 30.00 pack-year smoking history. He has never used smokeless tobacco.      ALLERGIES:  Patient is allergic to mobic [meloxicam] and morphine sulfate.     REVIEW OF SYSTEMS:  Constitutional: Negative for fever, positive for weight loss  HENT: Negative for sore throat, positive for dry mouth, positive for symptoms of seasonal allergic rhinitis  Eyes: Negative for redness   Respiratory: Negative for dyspnea, cough  Cardiovascular: Negative for chest pain  Gastrointestinal: Positive for intermittent diarrhea   Genitourinary: Negative for hematuria   Musculoskeletal: Negative for arthralgias, positive for back pain  Skin: Negative for rash  Neurological: Negative for syncope  Hematological: Negative for adenopathy  Psychiatric/Behavorial: Negative for anxiety    Objective:   PHYSICAL EXAM:  There were no vitals taken for this visit. Limited physical exam was performed as this was a virtual visit through [employer] me. The patient looks overweight, was awake, alert and oriented. External eye exam was normal.  He had a class III airway and was edentulous. He had a very large and short neck. There was no JVD or obvious lung masses. The patient had edema in the past, claims he does not have any now      Current Outpatient Medications   Medication Sig Dispense Refill    fluticasone (FLONASE) 50 MCG/ACT nasal spray 1 spray by Each Nostril route daily 2 Bottle 1    omeprazole (PRILOSEC) 20 MG delayed release capsule Take 1 capsule by mouth Daily 30 capsule 3    atorvastatin (LIPITOR) 10 MG tablet TAKE 1 TABLET BY MOUTH DAILY 90 tablet 1    Cholecalciferol 50 MCG (2000 UT) TABS Take 1 tablet by mouth daily Take 1 tablet by mouth daily. Start this medication after you finish the weekly regimen 90 tablet 2    vitamin D (CHOLECALCIFEROL) 08360 UNIT CAPS Take 1 capsule by mouth once a week 12 capsule 0    pregabalin (LYRICA) 150 MG capsule TAKE 1 CAPSULE BY MOUTH TWICE DAILY 60 capsule 0    levothyroxine (SYNTHROID) 100 MCG tablet Take 1 tablet by mouth daily 90 tablet 1    lisinopril (PRINIVIL;ZESTRIL) 30 MG tablet TAKE 1 TABLET BY MOUTH DAILY 90 tablet 1    medical marijuana Take by mouth as needed.       Blood Pressure KIT 1 kit by Does not apply route 2 times daily 1 kit 0    sildenafil (VIAGRA) 100 MG tablet Take 1 tablet by mouth as needed for Erectile Dysfunction (Patient not taking: Reported on 5/6/2021) 30 tablet 3     Current Facility-Administered Medications   Medication Dose Route Frequency Provider Last Rate Last Admin    sodium hyaluronate (viscosup) injection 40 mg  40 mg Intra-articular Once Na Herrera MD         Data Reviewed:   CBC and Renal reviewed  Last CBC  Lab Results   Component Value Date    WBC 5.7 03/23/2021    RBC 4.49 03/23/2021    HGB 13.8 03/23/2021    MCV 90.6 03/23/2021     03/23/2021     Last Renal  Lab Results   Component Value Date     03/23/2021    K 4.3 03/23/2021     03/23/2021    CO2 25 03/23/2021    CO2 25 09/14/2020    CO2 25 09/27/2019    BUN 12 03/23/2021    CREATININE 0.9 03/23/2021    GLUCOSE 132 03/23/2021    GLUCOSE 89 05/19/2011    CALCIUM 8.9 03/23/2021     Chest imaging reports were reviewed and imaging was reviewed by me    Assessment:     · SHANNON  · RLS  · Orbit obesity    Plan:      1. SHANNON (obstructive sleep apnea)  Mild SHANNON, sleep study was discussed in detail with the patient. I have recommended auto CPAP. The importance of compliance with regards to control of the disease and clearance for bariatric surgery was discussed with him. I discussed cleaning of the mask, hose, humidifier with them. After he gets his auto CPAP, he should call us prior to his follow-up visit for compliance if he has difficulty with acclimating. 2. RLS (restless legs syndrome)  The patient mentions symptoms are decreased as he is on Lyrica. Will await treatment of SHANNON  -    3. Allergic rhinitis   The patient was informed that CPAP can sometimes increase nasal congestion. He will be given a prescription for fluticasone nasal spray    4. Morbid obesity ([address]  Being evaluated by Dr. Noemi Adams, await compliance with CPAP    5. Prophylaxis  Has received Pneumovax, recommend annual flu shot and COVID-19 vaccination.   He had COVID-19 infection in November, I recommended he should go ahead with vaccination now    RTC for CPAP compliance    Vikas Ram, was evaluated through a synchronous (real-time) audio-video encounter. The patient (or guardian if applicable) is aware that this is a billable service. Verbal consent to proceed has been obtained within the past 12 months. The visit was conducted pursuant to the emergency declaration under the [facility], [address] [place] [address] authority and the [employer] Act. Patient identification was verified, and a caregiver was present when appropriate. The patient was located in a state where the provider was credentialed to provide care. Total time spent for this encounter: 17 minutes    --Bang Hartman MD on 5/6/2021 at 10:04 AM    An electronic signature was used to authenticate this note.

## 2021-05-10 DIAGNOSIS — E11.42 DIABETIC POLYNEUROPATHY ASSOCIATED WITH TYPE 2 DIABETES MELLITUS (HCC): ICD-10-CM

## 2021-05-10 RX ORDER — PREGABALIN 150 MG/1
CAPSULE ORAL
Qty: 60 CAPSULE | Refills: 0 | Status: CANCELLED | OUTPATIENT
Start: 2021-05-10 | End: 2021-08-04

## 2021-05-10 NOTE — PROGRESS NOTES
Juan Jose Santiagoon. Age 61 y.o.    male    1965    MRN [id number]    5/18/2021  Arrival Time_____________  OR Time____________[address]     Procedure(s):  RIGHT INDEX FINGER AND MIDDLE FINGER TRIGGER FINGER RELEASE                      Monitor Anesthesia Care    Surgeon(s):  Yumiko December, MD       Phone [phone] ([place])     [address]  Cell         Work  _____________________________________________________________________  _____________________________________________________________________  _____________________________________________________________________  _____________________________________________________________________  _____________________________________________________________________      PCP _____________________________ Phone_________________       H&P__________________Bringing      Chart            Epic   DOS      Called________  EKG__________________Bringing      Chart            Epic   DOS      Called________  LAB__________________ Bringing      Chart            Epic   DOS      Called________  Cardiac Clearance_______Bringing      Chart            Epic      DOS      Called________    Cardiologist________________________ Phone___________________________    ? Orthodox concerns / Waiver on Chart            [employer] Communications________________  ? Pre-op Instructions Given [place]          _________________________________  ? Directions to Surgery Center                          _________________________________  ? Transportation Home_______________      __________________________________  ?  Crutches/Walker__________________        __________________________________      ________Pre-op Orders   _______Transcribed    _______Wt.  ________Pharmacy          _______SCD  ______VTE     ______[employer]  ________Consent             ________TED HOSE    COVID DATE_________   LOCATION___________________  RESULT____________

## 2021-05-11 ENCOUNTER — TELEPHONE (OUTPATIENT)
Dept: FAMILY MEDICINE CLINIC | Age: 56
End: 2021-05-11

## 2021-05-11 NOTE — TELEPHONE ENCOUNTER
----- Message from Eyad German sent at 5/11/2021  7:46 AM EDT -----  Subject: Appointment Request    Reason for Call: Routine Pre-Op    QUESTIONS  Type of Appointment? Established Patient  Reason for appointment request? No appointments available during search  Additional Information for Provider? PT has hand surgery on 5/18 and needs   a pre-op appointment. There is no availability in the [facility]. PT asked to   have PCP return call to make appointment asap.   ---------------------------------------------------------------------------  --------------  [address]  What is the best way for the office to contact you? OK to leave message on   voicemail  Preferred Call Back Phone Number? [phone]  ---------------------------------------------------------------------------  --------------  SCRIPT ANSWERS  Relationship to Patient? Self  Appointment reason? Symptomatic  Select script based on patient symptoms? Adult Pre-Op   Do you have question for your provider that need to be answered prior to   scheduling your pre-op appointment? No  Have you been diagnosed with, tested for, or told that you are suspected   of having COVID-19 (Coronavirus)? No  Have you had a fever or taken medication to treat a fever within the past   3 days? No  Have you had a cough, shortness of breath or flu-like symptoms within the   past 3 days? No  Do you currently have flu-like symptoms including fever or chills, cough,   shortness of breath, or difficulty breathing, or new loss of taste or   smell? No  (Service Expert  click yes below to proceed with [employer] As Usual   Scheduling)?  Yes

## 2021-05-11 NOTE — PROGRESS NOTES
Preoperative Screening for Elective Surgery/Invasive Procedures While COVID-19 present in the community     Have you had any of the following symptoms? No  o Fever, chills  o Cough  o Shortness of breath  o Muscle aches/pain  o Diarrhea  o Abdominal pain, nausea, vomiting  o Loss or decrease in taste and / or smell   Risk of Exposure  o Have you recently been hospitalized for COVID-19 or flu-like illness, if so when? No  o Recently diagnosed with COVID-19, if so when? Nov 2020  o Recently tested for COVID-19, if so when? March 2020  o Have you been in close contact with a person or family member who currently has or recently had COVID-23? If yes, when and in what context? No  o Do you live with anybody who in the last 14 days has had fever, chills, shortness of breath, muscle aches, flu-like illness? No  o Do you have any close contacts or family members who are currently in the hospital for COVID-19 or flu-like illness? No  If yes, assess recent close contact with this person. Indicate if the patient has a positive screen by answering yes to one or more of the above questions. Patients who test positive or screen positive prior to surgery or on the day of surgery should be evaluated in conjunction with the surgeon/proceduralist/anesthesiologist to determine the urgency of the procedure.

## 2021-05-12 ENCOUNTER — OFFICE VISIT (OUTPATIENT)
Dept: PRIMARY CARE CLINIC | Age: 56
End: 2021-05-12
Payer: MEDICARE

## 2021-05-12 DIAGNOSIS — Z01.818 PREOP TESTING: Primary | ICD-10-CM

## 2021-05-12 PROCEDURE — G8428 CUR MEDS NOT DOCUMENT: HCPCS | Performed by: NURSE PRACTITIONER

## 2021-05-12 PROCEDURE — G8417 CALC BMI ABV UP PARAM F/U: HCPCS | Performed by: NURSE PRACTITIONER

## 2021-05-12 PROCEDURE — 99211 OFF/OP EST MAY X REQ PHY/QHP: CPT | Performed by: NURSE PRACTITIONER

## 2021-05-12 NOTE — PROGRESS NOTES
Ashtyn Nunez. received a viral test for COVID-19. They were educated on isolation and quarantine as appropriate. For any symptoms, they were directed to seek care from their PCP, given contact information to establish with a doctor, directed to an urgent care or the emergency room.

## 2021-05-12 NOTE — PATIENT INSTRUCTIONS

## 2021-05-13 LAB — SARS-COV-2: NOT DETECTED

## 2021-05-14 ENCOUNTER — TELEPHONE (OUTPATIENT)
Dept: ORTHOPEDIC SURGERY | Age: 56
End: 2021-05-14

## 2021-05-17 ENCOUNTER — TELEPHONE (OUTPATIENT)
Dept: FAMILY MEDICINE CLINIC | Age: 56
End: 2021-05-17

## 2021-05-17 ENCOUNTER — ANESTHESIA EVENT (OUTPATIENT)
Dept: OPERATING ROOM | Age: 56
End: 2021-05-17
Payer: MEDICARE

## 2021-05-17 NOTE — TELEPHONE ENCOUNTER
Sis with  [address] called. Patient was seen 5/14/2021for a pre-op. Please complete note in Epic surgery is tomorrow.     Please call Sis when note finished and she will view it in [facility]

## 2021-05-18 ENCOUNTER — HOSPITAL ENCOUNTER (OUTPATIENT)
Age: 56
Setting detail: OUTPATIENT SURGERY
Discharge: HOME | End: 2021-05-18
Attending: ORTHOPAEDIC SURGERY | Admitting: ORTHOPAEDIC SURGERY
Payer: MEDICARE

## 2021-05-18 ENCOUNTER — ANESTHESIA (OUTPATIENT)
Dept: OPERATING ROOM | Age: 56
End: 2021-05-18
Payer: MEDICARE

## 2021-05-18 VITALS — SYSTOLIC BLOOD PRESSURE: 120 MMHG | DIASTOLIC BLOOD PRESSURE: 67 MMHG | OXYGEN SATURATION: 100 %

## 2021-05-18 VITALS
DIASTOLIC BLOOD PRESSURE: 66 MMHG | HEART RATE: 60 BPM | OXYGEN SATURATION: 99 % | RESPIRATION RATE: 20 BRPM | WEIGHT: 280 LBS | SYSTOLIC BLOOD PRESSURE: 142 MMHG | BODY MASS INDEX: 42.44 KG/M2 | TEMPERATURE: 97.5 F | HEIGHT: 68 IN

## 2021-05-18 LAB
GLUCOSE BLD-MCNC: 111 MG/DL (ref 70–99)
GLUCOSE BLD-MCNC: 116 MG/DL (ref 70–99)
PERFORMED ON: ABNORMAL
PERFORMED ON: ABNORMAL

## 2021-05-18 PROCEDURE — 2500000003 HC RX 250 WO HCPCS: Performed by: NURSE ANESTHETIST, CERTIFIED REGISTERED

## 2021-05-18 PROCEDURE — 7100000011 HC PHASE II RECOVERY - ADDTL 15 MIN: Performed by: ORTHOPAEDIC SURGERY

## 2021-05-18 PROCEDURE — 2580000003 HC RX 258: Performed by: ANESTHESIOLOGY

## 2021-05-18 PROCEDURE — 7100000000 HC PACU RECOVERY - FIRST 15 MIN: Performed by: ORTHOPAEDIC SURGERY

## 2021-05-18 PROCEDURE — 2500000003 HC RX 250 WO HCPCS: Performed by: ORTHOPAEDIC SURGERY

## 2021-05-18 PROCEDURE — 3700000000 HC ANESTHESIA ATTENDED CARE: Performed by: ORTHOPAEDIC SURGERY

## 2021-05-18 PROCEDURE — 3600000012 HC SURGERY LEVEL 2 ADDTL 15MIN: Performed by: ORTHOPAEDIC SURGERY

## 2021-05-18 PROCEDURE — 2580000003 HC RX 258: Performed by: ORTHOPAEDIC SURGERY

## 2021-05-18 PROCEDURE — 6360000002 HC RX W HCPCS: Performed by: NURSE ANESTHETIST, CERTIFIED REGISTERED

## 2021-05-18 PROCEDURE — 3600000002 HC SURGERY LEVEL 2 BASE: Performed by: ORTHOPAEDIC SURGERY

## 2021-05-18 PROCEDURE — 3700000001 HC ADD 15 MINUTES (ANESTHESIA): Performed by: ORTHOPAEDIC SURGERY

## 2021-05-18 PROCEDURE — 2709999900 HC NON-CHARGEABLE SUPPLY: Performed by: ORTHOPAEDIC SURGERY

## 2021-05-18 PROCEDURE — 7100000010 HC PHASE II RECOVERY - FIRST 15 MIN: Performed by: ORTHOPAEDIC SURGERY

## 2021-05-18 RX ORDER — PROPOFOL 10 MG/ML
INJECTION, EMULSION INTRAVENOUS PRN
Status: DISCONTINUED | OUTPATIENT
Start: 2021-05-18 | End: 2021-05-18 | Stop reason: SDUPTHER

## 2021-05-18 RX ORDER — OXYCODONE HYDROCHLORIDE AND ACETAMINOPHEN 5; 325 MG/1; MG/1
1 TABLET ORAL PRN
Status: DISCONTINUED | OUTPATIENT
Start: 2021-05-18 | End: 2021-05-18 | Stop reason: HOSPADM

## 2021-05-18 RX ORDER — SODIUM CHLORIDE 9 MG/ML
25 INJECTION, SOLUTION INTRAVENOUS PRN
Status: DISCONTINUED | OUTPATIENT
Start: 2021-05-18 | End: 2021-05-18 | Stop reason: HOSPADM

## 2021-05-18 RX ORDER — MAGNESIUM HYDROXIDE 1200 MG/15ML
LIQUID ORAL CONTINUOUS PRN
Status: COMPLETED | OUTPATIENT
Start: 2021-05-18 | End: 2021-05-18

## 2021-05-18 RX ORDER — SODIUM CHLORIDE, SODIUM LACTATE, POTASSIUM CHLORIDE, CALCIUM CHLORIDE 600; 310; 30; 20 MG/100ML; MG/100ML; MG/100ML; MG/100ML
INJECTION, SOLUTION INTRAVENOUS CONTINUOUS
Status: DISCONTINUED | OUTPATIENT
Start: 2021-05-18 | End: 2021-05-18 | Stop reason: HOSPADM

## 2021-05-18 RX ORDER — ONDANSETRON 2 MG/ML
4 INJECTION INTRAMUSCULAR; INTRAVENOUS PRN
Status: DISCONTINUED | OUTPATIENT
Start: 2021-05-18 | End: 2021-05-18 | Stop reason: HOSPADM

## 2021-05-18 RX ORDER — SODIUM CHLORIDE 0.9 % (FLUSH) 0.9 %
5-40 SYRINGE (ML) INJECTION PRN
Status: DISCONTINUED | OUTPATIENT
Start: 2021-05-18 | End: 2021-05-18 | Stop reason: HOSPADM

## 2021-05-18 RX ORDER — HYDRALAZINE HYDROCHLORIDE 20 MG/ML
5 INJECTION INTRAMUSCULAR; INTRAVENOUS EVERY 10 MIN PRN
Status: DISCONTINUED | OUTPATIENT
Start: 2021-05-18 | End: 2021-05-18 | Stop reason: HOSPADM

## 2021-05-18 RX ORDER — LIDOCAINE HYDROCHLORIDE 20 MG/ML
INJECTION, SOLUTION INFILTRATION; PERINEURAL PRN
Status: DISCONTINUED | OUTPATIENT
Start: 2021-05-18 | End: 2021-05-18 | Stop reason: SDUPTHER

## 2021-05-18 RX ORDER — LIDOCAINE HYDROCHLORIDE 10 MG/ML
0.3 INJECTION, SOLUTION EPIDURAL; INFILTRATION; INTRACAUDAL; PERINEURAL
Status: DISCONTINUED | OUTPATIENT
Start: 2021-05-18 | End: 2021-05-18 | Stop reason: HOSPADM

## 2021-05-18 RX ORDER — PROMETHAZINE HYDROCHLORIDE 25 MG/ML
6.25 INJECTION, SOLUTION INTRAMUSCULAR; INTRAVENOUS
Status: DISCONTINUED | OUTPATIENT
Start: 2021-05-18 | End: 2021-05-18 | Stop reason: HOSPADM

## 2021-05-18 RX ORDER — KETAMINE HYDROCHLORIDE 100 MG/ML
INJECTION, SOLUTION INTRAMUSCULAR; INTRAVENOUS PRN
Status: DISCONTINUED | OUTPATIENT
Start: 2021-05-18 | End: 2021-05-18 | Stop reason: SDUPTHER

## 2021-05-18 RX ORDER — DIPHENHYDRAMINE HYDROCHLORIDE 50 MG/ML
12.5 INJECTION INTRAMUSCULAR; INTRAVENOUS
Status: DISCONTINUED | OUTPATIENT
Start: 2021-05-18 | End: 2021-05-18 | Stop reason: HOSPADM

## 2021-05-18 RX ORDER — MEPERIDINE HYDROCHLORIDE 50 MG/ML
12.5 INJECTION INTRAMUSCULAR; INTRAVENOUS; SUBCUTANEOUS EVERY 5 MIN PRN
Status: DISCONTINUED | OUTPATIENT
Start: 2021-05-18 | End: 2021-05-18 | Stop reason: HOSPADM

## 2021-05-18 RX ORDER — SODIUM CHLORIDE 0.9 % (FLUSH) 0.9 %
5-40 SYRINGE (ML) INJECTION EVERY 12 HOURS SCHEDULED
Status: DISCONTINUED | OUTPATIENT
Start: 2021-05-18 | End: 2021-05-18 | Stop reason: HOSPADM

## 2021-05-18 RX ORDER — LABETALOL HYDROCHLORIDE 5 MG/ML
5 INJECTION, SOLUTION INTRAVENOUS EVERY 10 MIN PRN
Status: DISCONTINUED | OUTPATIENT
Start: 2021-05-18 | End: 2021-05-18 | Stop reason: HOSPADM

## 2021-05-18 RX ORDER — OXYCODONE HYDROCHLORIDE AND ACETAMINOPHEN 5; 325 MG/1; MG/1
2 TABLET ORAL PRN
Status: DISCONTINUED | OUTPATIENT
Start: 2021-05-18 | End: 2021-05-18 | Stop reason: HOSPADM

## 2021-05-18 RX ADMIN — LIDOCAINE HYDROCHLORIDE 100 MG: 20 INJECTION, SOLUTION INFILTRATION; PERINEURAL at 09:08

## 2021-05-18 RX ADMIN — SODIUM CHLORIDE, POTASSIUM CHLORIDE, SODIUM LACTATE AND CALCIUM CHLORIDE: 600; 310; 30; 20 INJECTION, SOLUTION INTRAVENOUS at 09:06

## 2021-05-18 RX ADMIN — PROPOFOL 100 MG: 10 INJECTION, EMULSION INTRAVENOUS at 09:18

## 2021-05-18 RX ADMIN — SODIUM CHLORIDE, POTASSIUM CHLORIDE, SODIUM LACTATE AND CALCIUM CHLORIDE: 600; 310; 30; 20 INJECTION, SOLUTION INTRAVENOUS at 08:09

## 2021-05-18 RX ADMIN — KETAMINE HYDROCHLORIDE 10 MG: 100 INJECTION INTRAMUSCULAR; INTRAVENOUS at 09:08

## 2021-05-18 RX ADMIN — PROPOFOL 200 MG: 10 INJECTION, EMULSION INTRAVENOUS at 09:08

## 2021-05-18 ASSESSMENT — PULMONARY FUNCTION TESTS
PIF_VALUE: 1
PIF_VALUE: 0
PIF_VALUE: 1
PIF_VALUE: 0
PIF_VALUE: 1
PIF_VALUE: 0

## 2021-05-18 ASSESSMENT — PAIN DESCRIPTION - DESCRIPTORS: DESCRIPTORS: DISCOMFORT

## 2021-05-18 ASSESSMENT — PAIN SCALES - GENERAL: PAINLEVEL_OUTOF10: 0

## 2021-05-18 NOTE — ANESTHESIA PRE PROCEDURE
Department of Anesthesiology  Preprocedure Note       Name:  Erlin Johnston   Age:  61 y.o.  :  1965                                          MRN:  [id number]         Date:  2021      Surgeon: Syeda Erickson):  Sandra Gomez MD    Procedure: Procedure(s):  RIGHT INDEX FINGER AND MIDDLE FINGER TRIGGER FINGER RELEASE    Medications prior to admission:   Prior to Admission medications    Medication Sig Start Date End Date Taking? Authorizing Provider   Ibuprofen (MOTRIN PO) Take by mouth as needed   Yes Historical Provider, MD   fluticasone (FLONASE) 50 MCG/ACT nasal spray 1 spray by Each Nostril route daily 21  Yes Fidel Fraser MD   omeprazole (PRILOSEC) 20 MG delayed release capsule Take 1 capsule by mouth Daily 21  Yes Asif Mackey MD   atorvastatin (LIPITOR) 10 MG tablet TAKE 1 TABLET BY MOUTH DAILY 4/15/21  Yes Rick Segura DO   Cholecalciferol 50 MCG (2000 UT) TABS Take 1 tablet by mouth daily Take 1 tablet by mouth daily. Start this medication after you finish the weekly regimen 4/15/21  Yes Asif Mackey MD   vitamin D (CHOLECALCIFEROL) 16732 UNIT CAPS Take 1 capsule by mouth once a week 4/15/21 7/14/21 Yes Asif Mackey MD   pregabalin (LYRICA) 150 MG capsule TAKE 1 CAPSULE BY MOUTH TWICE DAILY 3/24/21 6/18/21 Yes Rick Segura DO   levothyroxine (SYNTHROID) 100 MCG tablet Take 1 tablet by mouth daily 3/9/21  Yes Rick Segura DO   lisinopril (PRINIVIL;ZESTRIL) 30 MG tablet TAKE 1 TABLET BY MOUTH DAILY 21  Yes Rick Segura DO   sildenafil (VIAGRA) 100 MG tablet Take 1 tablet by mouth as needed for Erectile Dysfunction 19  Yes Rick Segura DO   medical marijuana Take by mouth as needed.    Yes Historical Provider, MD   Blood Pressure KIT 1 kit by Does not apply route 2 times daily 21   Rick Segura DO       Current medications:    Current Facility-Administered Medications   Medication Dose Route Frequency Provider Last Rate Last Admin  ceFAZolin (ANCEF) 3000 mg in dextrose 5 % 100 mL IVPB  3,000 mg Intravenous On Call to Alin Veloz MD        lactated ringers infusion   Intravenous Continuous Myra James MD        sodium chloride flush 0.9 % injection 5-40 mL  5-40 mL Intravenous 2 times per day Myra James MD        sodium chloride flush 0.9 % injection 5-40 mL  5-40 mL Intravenous PRN Myra James MD        0.9 % sodium chloride infusion  25 mL Intravenous PRN Myra James MD        lidocaine PF 1 % injection 0.3 mL  0.3 mL Intradermal Once PRN Myra James MD           Allergies: Allergies   Allergen Reactions    Mobic [Meloxicam]      Upset stomach    Morphine Sulfate Itching     Pt is only allergic to Morphine ER, not IVP Morphine.        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    Diet-controlled diabetes mellitus ([facility] E11.9    Chronic pain disorder G89.4    Cerebrovascular accident ([facility] I63.9    Essential hypertension I10    Backache M54.9    Morbid obesity with BMI of 45.0-49.9, adult ([facility]) E66.01, Z68.42    Bipolar disorder ([facility] F31.9    Morbid obesity with BMI of 40.0-44.9, adult ([facility]) E66.01, Z68.41    Chronic GERD K21.9    Hiatal hernia K44.9    SHANNON (obstructive sleep apnea) G47.33    RLS (restless legs syndrome) G25.81    Morbid obesity ([facility]) E66.01       Past Medical History:        Diagnosis Date    Bipolar disorder ([facility]     CAD (coronary artery disease) 8/25/10    Cerebral artery occlusion with cerebral infarction ([facility] 2011    L hand less fine motor movements    Chronic back pain     Controlled type 2 diabetes mellitus with diabetic polyneuropathy, without long-term current use of insulin ([facility])     Controlled type 2 diabetes mellitus with diabetic polyneuropathy, without long-term current use of insulin ([facility]     Depression 8/25/10    Displacement of cervical intervertebral disc without myelopathy     Gastroesophageal reflux 8/25/10    Hypercholesteremia     Hyperlipidemia 8/25/10    Hypertension     benign essential    Hypogonadism     Hypothyroidism     acquired    Kidney stones     Morbid obesity with BMI of 40.0-44.9, adult ([facility] [address]     Morbid obesity with BMI of 40.0-44.9, adult ([facility]     SHANNON (obstructive sleep apnea)     mild-has not received CPAP yet    Osteoarthritis     Osteoarthritis of spine 8/25/10    Restless leg syndrome        Past Surgical History:        Procedure Laterality Date    CARDIAC SURGERY      angiogram    CERVICAL FUSION      several [id number]-G7-3 fusion, C6-7 fusion, C7-T1 fusion    CHOLECYSTECTOMY      COLONOSCOPY N/A 06/12/2019    COLONOSCOPY POLYPECTOMY SNARE/COLD BIOPSY performed by Theresa Munguia MD at [address] [facility] Bilateral 08/13/2019    BILATERAL LUMBAR THREE TRANSFORAMINAL EPIDURAL STEROID INJECTION SITE CONFIRMED BY FLUOROSCOPY performed by Martha Bahena MD at [address] Right 1984    two finger tendons reattached    HERNIA REPAIR      KNEE ARTHROSCOPY Right     LUMBAR FUSION      L4-5 fusion and L5-S1 fusion    LUMBAR SPINE SURGERY Bilateral 09/20/2019    BILATERAL L3 TRANSFORAMINAL EPIDURAL STEROID INJECTION WITH FLUOROSCOPY performed by Martha Bahena MD at [facility]      C1-2 fusion hardware removed    THORACIC FUSION      T6-10 fusion    UPPER GASTROINTESTINAL ENDOSCOPY N/A 4/23/2021    EGD BIOPSY performed by Carrie Branch MD at [address] [facility] History:    Social History     Tobacco Use    Smoking status: Former Smoker     Packs/day: 1.00     Years: 30.00     Pack years: 30.00     Types: Cigarettes     Start date: 3/20/1984     Quit date: 3/20/2020     Years since quittin.1    Smokeless tobacco: Never Used   Substance Use Topics    Alcohol use: No                                Counseling given: Not Answered      Vital Signs (Current):   Vitals:    21 1556 21 0744   BP:  (!) 140/84   Pulse:  59   Resp:  16   Temp:  96.7 °F (35.9 °C)   TempSrc:  Temporal   SpO2:  99%   Weight: 280 lb (127 kg) 280 lb (127 kg)   Height: 5' 8\" (1.727 m) 5' 8\" (1.727 m)                                              BP Readings from Last 3 Encounters:   21 (!) 140/84   21 131/77   21 (!) 143/85       NPO Status:                                                                                 BMI:   Wt Readings from Last 3 Encounters:   21 280 lb (127 kg)   21 280 lb (127 kg)   04/15/21 282 lb 6.4 oz (128.1 kg)     Body mass index is 42.57 kg/m². CBC:   Lab Results   Component Value Date    WBC 5.7 2021    RBC 4.49 2021    HGB 13.8 2021    HCT 40.6 2021    MCV 90.6 2021    RDW 12.9 2021     2021       CMP:   Lab Results   Component Value Date     2021    K 4.3 2021     2021    CO2 25 2021    BUN 12 2021    CREATININE 0.9 2021    GFRAA >60 2021    AGRATIO 0.9 2021    LABGLOM >60 2021    LABGLOM 80 2011    GLUCOSE 132 2021    GLUCOSE 89 2011    PROT 8.2 2021    PROT 8.0 2011    CALCIUM 8.9 2021    BILITOT 0.4 2021    ALKPHOS 47 2021    AST 18 2021    ALT 21 2021       POC Tests: No results for input(s): POCGLU, POCNA, POCK, POCCL, POCBUN, POCHEMO, POCHCT in the last 72 hours.     Coags:   Lab Results   Component Value Date    PROTIME 12.5 2021    INR 1.08 2021       HCG (If Applicable): No results found for: PREGTESTUR, PREGSERUM, HCG, HCGQUANT     ABGs: No results found for: PHART, PO2ART, DZS1XBL, GBB5ILT, BEART, U3NLWDSC     Type & Screen (If Applicable):  No results found for: LABABO, LABRH    Drug/Infectious Status (If Applicable):  No results found for: HIV, HEPCAB    COVID-19 Screening (If Applicable):   Lab Results   Component Value Date    COVID19 Not Detected 05/12/2021    COVID19 NOT DETECTED 01/26/2021           Anesthesia Evaluation  Patient summary reviewed no history of anesthetic complications:   Airway: Mallampati: II  TM distance: >3 FB   Neck ROM: full  Mouth opening: > = 3 FB Dental: normal exam         Pulmonary:Negative Pulmonary ROS and normal exam  breath sounds clear to auscultation  (+) sleep apnea:                             Cardiovascular:Negative CV ROS  Exercise tolerance: good (>4 METS),   (+) hypertension:,         Rhythm: regular  Rate: normal                    Neuro/Psych:   Negative Neuro/Psych ROS  (+) CVA:, psychiatric history:            GI/Hepatic/Renal: Neg GI/Hepatic/Renal ROS  (+) hiatal hernia, GERD: well controlled, morbid obesity          Endo/Other: Negative Endo/Other ROS   (+) Diabetes, hypothyroidism::., .                 Abdominal:           Vascular: negative vascular ROS. Pre-Operative Diagnosis: Trigger finger, right index finger [M65.321]; Trigger finger, right middle finger [M65.331]    61 y.o.   BMI:  Body mass index is 42.57 kg/m². Vitals:    05/11/21 1556 05/18/21 0744   BP:  (!) 140/84   Pulse:  59   Resp:  16   Temp:  96.7 °F (35.9 °C)   TempSrc:  Temporal   SpO2:  99%   Weight: 280 lb (127 kg) 280 lb (127 kg)   Height: 5' 8\" (1.727 m) 5' 8\" (1.727 m)       Allergies   Allergen Reactions    Mobic [Meloxicam]      Upset stomach    Morphine Sulfate Itching     Pt is only allergic to Morphine ER, not IVP Morphine.        Social History     Tobacco Use    Smoking status: Former Smoker     Packs/day: 1.00     Years: 30.00     Pack years: 30.00     Types: Cigarettes Start date: 3/20/1984     Quit date: 3/20/2020     Years since quittin.1    Smokeless tobacco: Never Used   Substance Use Topics    Alcohol use: No       LABS:    CBC  Lab Results   Component Value Date/Time    WBC 5.7 2021 08:25 AM    HGB 13.8 2021 08:25 AM    HCT 40.6 2021 08:25 AM     2021 08:25 AM     RENAL  Lab Results   Component Value Date/Time     (L) 2021 08:24 AM    K 4.3 2021 08:24 AM     2021 08:24 AM    CO2 25 2021 08:24 AM    BUN 12 2021 08:24 AM    CREATININE 0.9 2021 08:24 AM    GLUCOSE 132 (H) 2021 08:24 AM    GLUCOSE 89 2011 01:43 PM     COAGS  Lab Results   Component Value Date/Time    PROTIME 12.5 2021 08:25 AM    INR 1.08 2021 08:25 AM            Anesthesia Plan      MAC     ASA 3     (I discussed with the patient the risks and benefits of PIV, anesthesia, IV Narcotics, PACU. All questions were answered the patient agrees with the plan and wishes to proceed)  Induction: intravenous.                           Mary Pressley MD   2021

## 2021-05-18 NOTE — PROGRESS NOTES
Advancing hob without compaints  Tolerating Drinking and eating : Attempted to implement non pharmacological methods  of pain management-repositioned/ ice in place   No ponv  Pt alert and appropriate  Respirations  Easy/ unlabored/ no Chest pain or SOB   Surgical drsg unchanged from initial assessment  Circulation checks on operative limb unchanged from last entry in pacu  No pain

## 2021-05-18 NOTE — PROGRESS NOTES
POCT      Blood Glucose: 116      (Normal Range 70-99)    PT:      (Normal Range 9.8 - 13)    INR:      (Normal Range 0.86-1.14)

## 2021-05-18 NOTE — PROGRESS NOTES
Advanced pt from lying to sitting without adverse event/pt denies complaints of dizziness/ponv/ RESP  WNL/ surgical drsg/circulation checks on operative limb unchanged from my  last pacu assessment entry     Pt states pain is at a tolerable level-0     instructed pt if no void in 8 hours contact physician or go to ER    Discharge instructions reviewed with patient/responsible adult and understanding verbalized. Discharge instructions signed and copies given. Rx     none               Given to pts responsible adult/instructed not to drive/ingest alcohol while taking narcotic medications. Instructed pt/family member Last dose of narcotics given in recovery room was       none       @             Am/pm  . Medication information  sheet given to pt/family-all questions answered     If you are taking any anxioytics  or additional pain medicine please confirm with you ordering physician before  you start a new medicine      Please follow narcotic administration as prescribed by your surgeon- any questions call your surgeon. If you have any problems contact your surgeon and  Go to the emergency room.     Operative drsg unchanged from my initial pacu assessment

## 2021-05-18 NOTE — ANESTHESIA POSTPROCEDURE EVALUATION
135 (L)             03/23/2021 08:24 AM        K                        4.3                 03/23/2021 08:24 AM        CL                       101                 03/23/2021 08:24 AM        CO2                      25                  03/23/2021 08:24 AM        BUN                      12                  03/23/2021 08:24 AM        CREATININE               0.9                 03/23/2021 08:24 AM        GLUCOSE                  132 (H)             03/23/2021 08:24 AM        GLUCOSE                  89                  05/19/2011 01:43 PM   COAGS  Lab Results       Component                Value               Date/Time                  PROTIME                  12.5                03/23/2021 08:25 AM        INR                      1.08                03/23/2021 08:25 AM     Intake & Output:  @92OGKH@    Nausea & Vomiting:  No    Level of Consciousness:  Awake    Pain Assessment:  Adequate analgesia    Anesthesia Complications:  No apparent anesthetic complications    SUMMARY      Vital signs stable  OK to discharge from Stage I post anesthesia care.   Care transferred from Anesthesiology department on discharge from perioperative area

## 2021-05-18 NOTE — PROGRESS NOTES
Dr Yoselyn Flowers at bedside evaluating  ROM rt hand - pt following commands without difficulty - denies pain

## 2021-05-18 NOTE — OP NOTE
OPERATIVE REPORT          Patient:  Meagan Moore    YOB: 1965  Date of Service:  5/18/2021   Location:  [facility]        Preoperative Diagnosis:  Right Index Finger and Middle Finger trigger finger. Postoperative Diagnosis: Same. Procedure: Right Index Finger and Middle Finger trigger finger release (A1 pulley release). Surgeon:    Yo Lomeli. Nicolás Beltran MD    Surgical Assistant:    BUDDY Wylie Assistant    Anesthesia:  Local with sedation. Blood Loss:  Minimal.     Complications:  None. Tourniquet Time:  3 minutes. Indications:  Mr. Meagan Moore  is a 61y.o.  year old male with a Right Index Finger and Middle Finger trigger finger. I have discussed preoperatively with him the complications, limitations, expectations, alternatives and risks of the planned surgical care. He has voiced an understanding of our discussion. All of his questions have been fully answered to his satisfaction, and he has provided written informed consent to proceed. Procedure:  After written consent was obtained and the proper operative site was identified and marked, Mr. Meagan Moore  was brought to the operating room, placed in the supine position on the operating room table with the Right arm extended upon a hand table. Under an appropriate level of sedation, local anesthetic (1% Lidocaine and 1/2% Marcaine both without Epinephrine) was instilled in the planned surgical field. His Right upper extremity was prepped and draped in the usual sterile fashion. After Eshmarch exsanguination the pneumo-tourniquet was inflated to 250 milimeters of mercury. A 1 centimeter oblique incision was fashioned over the base of the flexor tendon sheath of the Right Index Finger. Dissection was carried carefully through the subcutaneous tissues, taking great care to identify and protect the neurovascular structures.   The flexor tendon sheath was carefully identified and cleared of surrounding soft tissue. The A1 pulley was identified and incised longitudinally along its entire length under direct visualization. The flexor tendons were gently withdrawn from the sheath and inspected. They were found to be in good condition. The tendons were returned to their appropriate location and the finger was placed through a full range of motion. There was no evidence of residual stenosis or triggering. A 1 centimeter oblique incision was fashioned over the base of the flexor tendon sheath of the Right Middle Finger. Dissection was carried carefully through the subcutaneous tissues, taking great care to identify and protect the neurovascular structures. The flexor tendon sheath was carefully identified and cleared of surrounding soft tissue. The A1 pulley was identified and incised longitudinally along its entire length under direct visualization. The flexor tendons were gently withdrawn from the sheath and inspected. They were found to be in good condition. The tendons were returned to their appropriate location and the finger was placed through a full range of motion. There was no evidence of residual stenosis or triggering. The wound was irrigated copiously with sterile saline for irrigation and the pneumo-tourniquet was deflated after a period of 3 minutes elevation. Hemostasis was easily obtained with direct pressure and electrocautery. The wound was closed with interrupted sutures in the skin. The wound was dressed with adaptic, dry sterile gauze, and a bulky, hand based dressing was applied. Mr. Kamryn Choi  was awakened from light sedation, having tolerated the procedure without apparent complication, and was returned to the recovery room in stable condition. At the conclusion of the procedure all needle, instrument, and sponge counts were correct. Mercedes Garland MD   5/18/2021, 9:23 AM

## 2021-05-24 DIAGNOSIS — E11.42 DIABETIC POLYNEUROPATHY ASSOCIATED WITH TYPE 2 DIABETES MELLITUS (HCC): ICD-10-CM

## 2021-05-24 NOTE — TELEPHONE ENCOUNTER
Future Appointments   Date Time Provider Clair Valencia   5/27/2021  9:00 AM Edyta Dsouza MD [facility]   6/2/2021  9:00 AM Yumiko Mack MD [facility] ORTHO [facility]   6/15/2021  9:00 AM DO TAMARA Rose  [place] [facility]   7/12/2021 10:15 AM Shay Diop MD AND [facility]M [facility]     LOV 3/9/2021

## 2021-05-24 NOTE — TELEPHONE ENCOUNTER
Patient needs a refill on pregabalin (LYRICA) 150 MG capsule      They need a 30 day supply.          Pharmacy: [id number]

## 2021-05-25 RX ORDER — PREGABALIN 150 MG/1
CAPSULE ORAL
Qty: 60 CAPSULE | Refills: 0 | Status: SHIPPED | OUTPATIENT
Start: 2021-05-25 | End: 2021-06-15 | Stop reason: SDUPTHER

## 2021-06-15 DIAGNOSIS — E11.42 DIABETIC POLYNEUROPATHY ASSOCIATED WITH TYPE 2 DIABETES MELLITUS (HCC): ICD-10-CM

## 2021-06-15 RX ORDER — PREGABALIN 150 MG/1
CAPSULE ORAL
Qty: 60 CAPSULE | Refills: 0 | Status: SHIPPED | OUTPATIENT
Start: 2021-06-15 | End: 2021-09-08

## 2021-06-15 NOTE — TELEPHONE ENCOUNTER
Please let patient know that I have sent the refill to his local [address] and that they will need to forward to his current location ([facility]). I am legally not allowed to send a controlled prescription out-of-state. Thank you.

## 2021-06-15 NOTE — TELEPHONE ENCOUNTER
Patient called. He had an emergency and went out of town unexpectedly and for got his medications.   Needs Lyrica (the other 2 med's can be transferred by [facility])  Pharmacy Nicolas Schafer, [place]

## 2021-07-08 ENCOUNTER — TELEPHONE (OUTPATIENT)
Dept: PULMONOLOGY | Age: 56
End: 2021-07-08

## 2021-07-08 NOTE — TELEPHONE ENCOUNTER
Patient cancelled appointment on 7/12/21 with Dr. Js Newell for 31/90 day. Reason: provider out of the office    Patient did not reschedule appointment.

## 2021-07-28 ENCOUNTER — TELEPHONE (OUTPATIENT)
Dept: FAMILY MEDICINE CLINIC | Age: 56
End: 2021-07-28

## 2022-11-30 PROBLEM — E11.9 DIET-CONTROLLED DIABETES MELLITUS (HCC): Status: RESOLVED | Noted: 2017-08-14 | Resolved: 2022-11-30

## 2023-04-26 RX ORDER — OMEPRAZOLE 20 MG/1
20 CAPSULE, DELAYED RELEASE ORAL DAILY
Qty: 90 CAPSULE | OUTPATIENT
Start: 2023-04-26

## 2023-10-23 ENCOUNTER — TELEPHONE (OUTPATIENT)
Dept: ORTHOPEDIC SURGERY | Age: 58
End: 2023-10-23

## 2023-10-23 NOTE — TELEPHONE ENCOUNTER
Imported medical records (all - ortho) into [place] for [facility]    No PT on file    Sent the request to [employer] billing.

## (undated) DEVICE — GLOVE SURG SZ 75 L12IN FNGR THK94MIL STD WHT LTX FREE

## (undated) DEVICE — UNIVERSAL BLOCK TRAY: Brand: AVANOS*

## (undated) DEVICE — NEEDLE HYPO 25GA L1.5IN BLU POLYPR HUB S STL REG BVL STR

## (undated) DEVICE — STERILE POLYISOPRENE POWDER-FREE SURGICAL GLOVES: Brand: PROTEXIS

## (undated) DEVICE — ALCOHOL RUBBING 16OZ 70% ISO

## (undated) DEVICE — SET GRAV VENT NVENT CK VLV 3 NDL FREE PRT 10 GTT

## (undated) DEVICE — UNIVERSAL BLOCK TRAY: Brand: MEDLINE INDUSTRIES, INC.

## (undated) DEVICE — SET ADMIN PRIMING 7ML L30IN 7.35LB 20 GTT 2ND RLER CLMP

## (undated) DEVICE — SYRINGE MED 10ML LUERLOCK TIP W/O SFTY DISP

## (undated) DEVICE — PEN: MARKING STD 100/CS: Brand: MEDICAL ACTION INDUSTRIES

## (undated) DEVICE — BW-412T DISP COMBO CLEANING BRUSH: Brand: SINGLE USE COMBINATION CLEANING BRUSH

## (undated) DEVICE — 3M™ TEGADERM™ TRANSPARENT FILM DRESSING FRAME STYLE, 1624W, 2-3/8 IN X 2-3/4 IN (6 CM X 7 CM), 100/CT 4CT/CASE: Brand: 3M™ TEGADERM™

## (undated) DEVICE — NEEDLE SPNL 22GA L5IN BLK HUB S STL W/ QNCKE PNT W/OUT

## (undated) DEVICE — SOLUTION IV IRRIG 500ML 0.9% SODIUM CHL 2F7123

## (undated) DEVICE — STANDARD HYPODERMIC NEEDLE,POLYPROPYLENE HUB: Brand: MONOJECT

## (undated) DEVICE — DRESSING PETRO W7.6XL7.6CM CELOS ACETT NONADHERING MESH

## (undated) DEVICE — PROCEDURE KIT ENDOSCP CUST

## (undated) DEVICE — NEEDLE HYPO 18GA L1.5IN THN WALL PIVOTING SHLD BVL ORIENTED

## (undated) DEVICE — SET EXTN L7IN PRIMING VOL 0.5ML PRSS RATE STD BOR 1 REM

## (undated) DEVICE — MOUTHPIECE ENDOSCP L CTRL OPN AND SIDE PORTS DISP

## (undated) DEVICE — SET VLV 3 PC AWS DISPOSABLE GRDIAN SCOPEVALET

## (undated) DEVICE — SOLUTION IV IRRIG WATER 500ML POUR BRL ST 2F7113

## (undated) DEVICE — MEDIA CONTRAST RX ISOVUE-300 61% 30ML VIALS

## (undated) DEVICE — Device: Brand: DISPOSABLE ELECTROSURGICAL SNARE

## (undated) DEVICE — GLOVE SURG SZ 65 L12IN FNGR THK94MIL STD WHT LTX FREE

## (undated) DEVICE — FORCEPS BX L240CM WRK CHN 2.8MM STD CAP W/ NDL MIC MESH

## (undated) DEVICE — ZIMMER® STERILE DISPOSABLE TOURNIQUET CUFF WITH PLC, DUAL PORT, SINGLE BLADDER, 18 IN. (46 CM)

## (undated) DEVICE — TOWEL,OR,DSP,ST,BLUE,STD,4/PK,20PK/CS: Brand: MEDLINE

## (undated) DEVICE — CHLORAPREP 26ML ORANGE

## (undated) DEVICE — CATHETER IV 20GA L1.25IN PNK FEP SFTY STR HUB RADPQ DISP

## (undated) DEVICE — SUTURE CHROMIC GUT SZ 4-0 L18IN ABSRB BRN L19MM PS-2 3/8 1637G

## (undated) DEVICE — CORD ES L12FT BPLR FRCP

## (undated) DEVICE — SYRINGE MED 3ML CLR PLAS STD N CTRL LUERLOCK TIP DISP

## (undated) DEVICE — DISPOSABLE OR TOWEL: Brand: CARDINAL HEALTH

## (undated) DEVICE — Device

## (undated) DEVICE — GAUZE,SPONGE,4"X4",16PLY,STRL,LF,10/TRAY: Brand: MEDLINE

## (undated) DEVICE — DRAPE HND W114XL142IN BLU POLYPR W O PCH FEN CRD AND TB HLDR

## (undated) DEVICE — GLOVE SURG SZ 65 L12IN FNGR THK79MIL GRN LTX FREE

## (undated) DEVICE — GLOVE,SURG,SENSICARE,ALOE,LF,PF,7: Brand: MEDLINE

## (undated) DEVICE — TRAP POLYP ETRAP

## (undated) DEVICE — UNDERGLOVE SURG SZ 8 FNGR THK0.21MIL GRN LTX BEAD CUF

## (undated) DEVICE — SOLUTION IV 1000ML LAC RINGERS PH 6.5 INJ USP VIAFLX PLAS

## (undated) DEVICE — ENDO CARRY-ON PROCEDURE KIT INCLUDES SUCTION TUBING, LUBRICANT, GAUZE, BIOHAZARD STICKER, TRANSPORT PAD AND INTERCEPT BEDSIDE KIT.: Brand: ENDO CARRY-ON PROCEDURE KIT

## (undated) DEVICE — WRAP COMPR W2INXL5YD TAN SELF ADH COBAN

## (undated) DEVICE — Device: Brand: JELCO